# Patient Record
Sex: MALE | Race: WHITE | Employment: OTHER | URBAN - NONMETROPOLITAN AREA
[De-identification: names, ages, dates, MRNs, and addresses within clinical notes are randomized per-mention and may not be internally consistent; named-entity substitution may affect disease eponyms.]

---

## 2021-12-18 ENCOUNTER — APPOINTMENT (OUTPATIENT)
Dept: CT IMAGING | Age: 86
DRG: 477 | End: 2021-12-18
Payer: MEDICARE

## 2021-12-18 ENCOUNTER — APPOINTMENT (OUTPATIENT)
Dept: GENERAL RADIOLOGY | Age: 86
DRG: 477 | End: 2021-12-18
Payer: MEDICARE

## 2021-12-18 ENCOUNTER — HOSPITAL ENCOUNTER (INPATIENT)
Age: 86
LOS: 13 days | Discharge: NURSING, REHAB OR LONG-TERM CARE | DRG: 477 | End: 2021-12-31
Attending: EMERGENCY MEDICINE | Admitting: SURGERY
Payer: MEDICARE

## 2021-12-18 DIAGNOSIS — S22.069A CLOSED FRACTURE OF EIGHTH THORACIC VERTEBRA, UNSPECIFIED FRACTURE MORPHOLOGY, INITIAL ENCOUNTER (HCC): ICD-10-CM
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
## 2021-12-18 PROBLEM — F05 SUNDOWN SYNDROME: Status: ACTIVE | Noted: 2021-12-18

## 2021-12-18 PROBLEM — K21.9 GASTRO-ESOPHAGEAL REFLUX DISEASE WITHOUT ESOPHAGITIS: Status: ACTIVE | Noted: 2021-12-18

## 2021-12-18 PROBLEM — N18.30 STAGE 3 CHRONIC KIDNEY DISEASE (HCC): Status: ACTIVE | Noted: 2021-10-12

## 2021-12-18 PROBLEM — I42.9 CARDIOMYOPATHY (HCC): Status: ACTIVE | Noted: 2021-12-18

## 2021-12-18 PROBLEM — I48.0 PAROXYSMAL ATRIAL FIBRILLATION (HCC): Status: ACTIVE | Noted: 2021-10-12

## 2021-12-18 PROBLEM — Z95.810 BIVENTRICULAR IMPLANTABLE CARDIOVERTER-DEFIBRILLATOR (ICD) IN SITU: Status: ACTIVE | Noted: 2021-10-12

## 2021-12-18 LAB
ALBUMIN SERPL-MCNC: 4.1 G/DL (ref 3.5–5.1)
ALP BLD-CCNC: 127 U/L (ref 38–126)
ALT SERPL-CCNC: 14 U/L (ref 11–66)
ANION GAP SERPL CALCULATED.3IONS-SCNC: 16 MEQ/L (ref 8–16)
APTT: 31 SECONDS (ref 22–38)
AST SERPL-CCNC: 15 U/L (ref 5–40)
BACTERIA: ABNORMAL /HPF
BASOPHILS # BLD: 1 %
BASOPHILS ABSOLUTE: 0.1 THOU/MM3 (ref 0–0.1)
BILIRUB SERPL-MCNC: 0.6 MG/DL (ref 0.3–1.2)
BILIRUBIN DIRECT: < 0.2 MG/DL (ref 0–0.3)
BILIRUBIN URINE: NEGATIVE
BLOOD, URINE: NEGATIVE
BUN BLDV-MCNC: 51 MG/DL (ref 7–22)
CALCIUM SERPL-MCNC: 9.4 MG/DL (ref 8.5–10.5)
CASTS 2: ABNORMAL /LPF
CASTS UA: ABNORMAL /LPF
CHARACTER, URINE: CLEAR
CHLORIDE BLD-SCNC: 98 MEQ/L (ref 98–111)
CO2: 24 MEQ/L (ref 23–33)
COLOR: YELLOW
CREAT SERPL-MCNC: 2 MG/DL (ref 0.4–1.2)
CRYSTALS, UA: ABNORMAL
EKG ATRIAL RATE: 87 BPM
EKG P AXIS: 33 DEGREES
EKG Q-T INTERVAL: 426 MS
EKG QRS DURATION: 166 MS
EKG QTC CALCULATION (BAZETT): 512 MS
EKG R AXIS: -70 DEGREES
EKG T AXIS: 22 DEGREES
EKG VENTRICULAR RATE: 87 BPM
EOSINOPHIL # BLD: 7 %
EOSINOPHILS ABSOLUTE: 0.4 THOU/MM3 (ref 0–0.4)
EPITHELIAL CELLS, UA: ABNORMAL /HPF
ERYTHROCYTE [DISTWIDTH] IN BLOOD BY AUTOMATED COUNT: 12.9 % (ref 11.5–14.5)
ERYTHROCYTE [DISTWIDTH] IN BLOOD BY AUTOMATED COUNT: 46.5 FL (ref 35–45)
GFR SERPL CREATININE-BSD FRML MDRD: 32 ML/MIN/1.73M2
GLUCOSE BLD-MCNC: 103 MG/DL (ref 70–108)
GLUCOSE BLD-MCNC: 93 MG/DL (ref 70–108)
GLUCOSE BLD-MCNC: 97 MG/DL (ref 70–108)
GLUCOSE URINE: NEGATIVE MG/DL
HCT VFR BLD CALC: 39.1 % (ref 42–52)
HEMOGLOBIN: 12.4 GM/DL (ref 14–18)
IMMATURE GRANS (ABS): 0.01 THOU/MM3 (ref 0–0.07)
IMMATURE GRANULOCYTES: 0.2 %
INR BLD: 1.07 (ref 0.85–1.13)
KETONES, URINE: NEGATIVE
LEUKOCYTE ESTERASE, URINE: NEGATIVE
LYMPHOCYTES # BLD: 12.9 %
LYMPHOCYTES ABSOLUTE: 0.8 THOU/MM3 (ref 1–4.8)
MCH RBC QN AUTO: 31.3 PG (ref 26–33)
MCHC RBC AUTO-ENTMCNC: 31.7 GM/DL (ref 32.2–35.5)
MCV RBC AUTO: 98.7 FL (ref 80–94)
MISCELLANEOUS 2: ABNORMAL
MONOCYTES # BLD: 9.9 %
MONOCYTES ABSOLUTE: 0.6 THOU/MM3 (ref 0.4–1.3)
NITRITE, URINE: NEGATIVE
NUCLEATED RED BLOOD CELLS: 0 /100 WBC
PH UA: 5 (ref 5–9)
PLATELET # BLD: 203 THOU/MM3 (ref 130–400)
PMV BLD AUTO: 8.7 FL (ref 9.4–12.4)
POTASSIUM REFLEX MAGNESIUM: 4.6 MEQ/L (ref 3.5–5.2)
PROTEIN UA: 30
RBC # BLD: 3.96 MILL/MM3 (ref 4.7–6.1)
RBC URINE: ABNORMAL /HPF
RENAL EPITHELIAL, UA: ABNORMAL
SEG NEUTROPHILS: 69 %
SEGMENTED NEUTROPHILS ABSOLUTE COUNT: 4.3 THOU/MM3 (ref 1.8–7.7)
SODIUM BLD-SCNC: 138 MEQ/L (ref 135–145)
SPECIFIC GRAVITY, URINE: 1.01 (ref 1–1.03)
TOTAL PROTEIN: 6.9 G/DL (ref 6.1–8)
UROBILINOGEN, URINE: 0.2 EU/DL (ref 0–1)
WBC # BLD: 6.3 THOU/MM3 (ref 4.8–10.8)
WBC UA: ABNORMAL /HPF
YEAST: ABNORMAL

## 2021-12-18 PROCEDURE — 82948 REAGENT STRIP/BLOOD GLUCOSE: CPT

## 2021-12-18 PROCEDURE — 80048 BASIC METABOLIC PNL TOTAL CA: CPT

## 2021-12-18 PROCEDURE — APPSS180 APP SPLIT SHARED TIME > 60 MINUTES: Performed by: NURSE PRACTITIONER

## 2021-12-18 PROCEDURE — 1200000003 HC TELEMETRY R&B

## 2021-12-18 PROCEDURE — 93005 ELECTROCARDIOGRAM TRACING: CPT | Performed by: STUDENT IN AN ORGANIZED HEALTH CARE EDUCATION/TRAINING PROGRAM

## 2021-12-18 PROCEDURE — 85610 PROTHROMBIN TIME: CPT

## 2021-12-18 PROCEDURE — 99221 1ST HOSP IP/OBS SF/LOW 40: CPT | Performed by: NURSE PRACTITIONER

## 2021-12-18 PROCEDURE — 71045 X-RAY EXAM CHEST 1 VIEW: CPT

## 2021-12-18 PROCEDURE — 85730 THROMBOPLASTIN TIME PARTIAL: CPT

## 2021-12-18 PROCEDURE — 99222 1ST HOSP IP/OBS MODERATE 55: CPT | Performed by: NEUROLOGICAL SURGERY

## 2021-12-18 PROCEDURE — 99223 1ST HOSP IP/OBS HIGH 75: CPT | Performed by: SURGERY

## 2021-12-18 PROCEDURE — 3209999900 CT INTERPRETATION OF OUTSIDE IMAGES

## 2021-12-18 PROCEDURE — 6820000002 HC L2 INJURY CALL ACTIVATION: Performed by: SURGERY

## 2021-12-18 PROCEDURE — 85025 COMPLETE CBC W/AUTO DIFF WBC: CPT

## 2021-12-18 PROCEDURE — 99285 EMERGENCY DEPT VISIT HI MDM: CPT

## 2021-12-18 PROCEDURE — 6370000000 HC RX 637 (ALT 250 FOR IP): Performed by: STUDENT IN AN ORGANIZED HEALTH CARE EDUCATION/TRAINING PROGRAM

## 2021-12-18 PROCEDURE — 6370000000 HC RX 637 (ALT 250 FOR IP): Performed by: NURSE PRACTITIONER

## 2021-12-18 PROCEDURE — 2580000003 HC RX 258: Performed by: NURSE PRACTITIONER

## 2021-12-18 PROCEDURE — 80076 HEPATIC FUNCTION PANEL: CPT

## 2021-12-18 PROCEDURE — 81001 URINALYSIS AUTO W/SCOPE: CPT

## 2021-12-18 RX ORDER — DEXTROSE MONOHYDRATE 50 MG/ML
100 INJECTION, SOLUTION INTRAVENOUS PRN
Status: DISCONTINUED | OUTPATIENT
Start: 2021-12-18 | End: 2021-12-31 | Stop reason: HOSPADM

## 2021-12-18 RX ORDER — SODIUM CHLORIDE 9 MG/ML
25 INJECTION, SOLUTION INTRAVENOUS PRN
Status: DISCONTINUED | OUTPATIENT
Start: 2021-12-18 | End: 2021-12-21 | Stop reason: SDUPTHER

## 2021-12-18 RX ORDER — LIDOCAINE 4 G/G
3 PATCH TOPICAL DAILY
Status: DISCONTINUED | OUTPATIENT
Start: 2021-12-18 | End: 2021-12-31 | Stop reason: HOSPADM

## 2021-12-18 RX ORDER — SODIUM CHLORIDE 9 MG/ML
INJECTION, SOLUTION INTRAVENOUS CONTINUOUS
Status: DISCONTINUED | OUTPATIENT
Start: 2021-12-18 | End: 2021-12-24

## 2021-12-18 RX ORDER — SODIUM CHLORIDE 0.9 % (FLUSH) 0.9 %
5-40 SYRINGE (ML) INJECTION PRN
Status: DISCONTINUED | OUTPATIENT
Start: 2021-12-18 | End: 2021-12-21 | Stop reason: SDUPTHER

## 2021-12-18 RX ORDER — HYDROCODONE BITARTRATE AND ACETAMINOPHEN 5; 325 MG/1; MG/1
2 TABLET ORAL EVERY 4 HOURS PRN
Status: DISCONTINUED | OUTPATIENT
Start: 2021-12-18 | End: 2021-12-31 | Stop reason: HOSPADM

## 2021-12-18 RX ORDER — DEXTROSE MONOHYDRATE 25 G/50ML
12.5 INJECTION, SOLUTION INTRAVENOUS PRN
Status: DISCONTINUED | OUTPATIENT
Start: 2021-12-18 | End: 2021-12-31 | Stop reason: HOSPADM

## 2021-12-18 RX ORDER — POLYETHYLENE GLYCOL 3350 17 G/17G
17 POWDER, FOR SOLUTION ORAL DAILY
Status: DISCONTINUED | OUTPATIENT
Start: 2021-12-18 | End: 2021-12-24

## 2021-12-18 RX ORDER — SODIUM PHOSPHATE, DIBASIC AND SODIUM PHOSPHATE, MONOBASIC 7; 19 G/133ML; G/133ML
1 ENEMA RECTAL DAILY PRN
Status: DISCONTINUED | OUTPATIENT
Start: 2021-12-18 | End: 2021-12-31 | Stop reason: HOSPADM

## 2021-12-18 RX ORDER — FAMOTIDINE 20 MG/1
20 TABLET, FILM COATED ORAL EVERY OTHER DAY
Status: DISCONTINUED | OUTPATIENT
Start: 2021-12-18 | End: 2021-12-25

## 2021-12-18 RX ORDER — CYCLOBENZAPRINE HCL 10 MG
10 TABLET ORAL 3 TIMES DAILY PRN
Status: DISCONTINUED | OUTPATIENT
Start: 2021-12-18 | End: 2021-12-31 | Stop reason: HOSPADM

## 2021-12-18 RX ORDER — SODIUM CHLORIDE 0.9 % (FLUSH) 0.9 %
5-40 SYRINGE (ML) INJECTION EVERY 12 HOURS SCHEDULED
Status: DISCONTINUED | OUTPATIENT
Start: 2021-12-18 | End: 2021-12-21 | Stop reason: SDUPTHER

## 2021-12-18 RX ORDER — NICOTINE POLACRILEX 4 MG
15 LOZENGE BUCCAL PRN
Status: DISCONTINUED | OUTPATIENT
Start: 2021-12-18 | End: 2021-12-31 | Stop reason: HOSPADM

## 2021-12-18 RX ORDER — HYDROCODONE BITARTRATE AND ACETAMINOPHEN 5; 325 MG/1; MG/1
1 TABLET ORAL EVERY 4 HOURS PRN
Status: DISCONTINUED | OUTPATIENT
Start: 2021-12-18 | End: 2021-12-31 | Stop reason: HOSPADM

## 2021-12-18 RX ORDER — MORPHINE SULFATE 4 MG/ML
4 INJECTION, SOLUTION INTRAMUSCULAR; INTRAVENOUS
Status: ACTIVE | OUTPATIENT
Start: 2021-12-18 | End: 2021-12-20

## 2021-12-18 RX ORDER — ONDANSETRON 4 MG/1
4 TABLET, ORALLY DISINTEGRATING ORAL EVERY 8 HOURS PRN
Status: DISCONTINUED | OUTPATIENT
Start: 2021-12-18 | End: 2021-12-21 | Stop reason: SDUPTHER

## 2021-12-18 RX ORDER — HYDROCODONE BITARTRATE AND ACETAMINOPHEN 5; 325 MG/1; MG/1
1 TABLET ORAL ONCE
Status: COMPLETED | OUTPATIENT
Start: 2021-12-18 | End: 2021-12-18

## 2021-12-18 RX ORDER — ONDANSETRON 2 MG/ML
4 INJECTION INTRAMUSCULAR; INTRAVENOUS EVERY 6 HOURS PRN
Status: DISCONTINUED | OUTPATIENT
Start: 2021-12-18 | End: 2021-12-21 | Stop reason: SDUPTHER

## 2021-12-18 RX ORDER — MORPHINE SULFATE 2 MG/ML
2 INJECTION, SOLUTION INTRAMUSCULAR; INTRAVENOUS
Status: ACTIVE | OUTPATIENT
Start: 2021-12-18 | End: 2021-12-20

## 2021-12-18 RX ORDER — ACETAMINOPHEN 325 MG/1
650 TABLET ORAL EVERY 4 HOURS PRN
Status: DISCONTINUED | OUTPATIENT
Start: 2021-12-18 | End: 2021-12-21

## 2021-12-18 RX ADMIN — HYDROCODONE BITARTRATE AND ACETAMINOPHEN 1 TABLET: 5; 325 TABLET ORAL at 01:45

## 2021-12-18 RX ADMIN — SODIUM CHLORIDE: 9 INJECTION, SOLUTION INTRAVENOUS at 06:11

## 2021-12-18 RX ADMIN — FAMOTIDINE 20 MG: 20 TABLET ORAL at 09:30

## 2021-12-18 RX ADMIN — CYCLOBENZAPRINE 10 MG: 10 TABLET, FILM COATED ORAL at 09:30

## 2021-12-18 RX ADMIN — HYDROCODONE BITARTRATE AND ACETAMINOPHEN 2 TABLET: 5; 325 TABLET ORAL at 09:30

## 2021-12-18 RX ADMIN — CYCLOBENZAPRINE 10 MG: 10 TABLET, FILM COATED ORAL at 21:07

## 2021-12-18 RX ADMIN — POLYETHYLENE GLYCOL 3350 17 G: 17 POWDER, FOR SOLUTION ORAL at 09:30

## 2021-12-18 ASSESSMENT — ENCOUNTER SYMPTOMS
APNEA: 0
NAUSEA: 0
VOMITING: 0
COUGH: 0
ABDOMINAL DISTENTION: 0
EYE PAIN: 0
BACK PAIN: 1
WHEEZING: 0
CHEST TIGHTNESS: 0
CHOKING: 0
CONSTIPATION: 0
EYE ITCHING: 0
RHINORRHEA: 0
STRIDOR: 0
SHORTNESS OF BREATH: 0
ABDOMINAL PAIN: 0
FACIAL SWELLING: 0
VOICE CHANGE: 0
TROUBLE SWALLOWING: 0
EYE REDNESS: 0
COLOR CHANGE: 0
SORE THROAT: 0
DIARRHEA: 0
PHOTOPHOBIA: 0
EYE DISCHARGE: 0
BLOOD IN STOOL: 0
SINUS PRESSURE: 0

## 2021-12-18 ASSESSMENT — PAIN SCALES - GENERAL
PAINLEVEL_OUTOF10: 5
PAINLEVEL_OUTOF10: 0
PAINLEVEL_OUTOF10: 4
PAINLEVEL_OUTOF10: 8
PAINLEVEL_OUTOF10: 5

## 2021-12-18 ASSESSMENT — PAIN DESCRIPTION - FREQUENCY: FREQUENCY: CONTINUOUS

## 2021-12-18 ASSESSMENT — PAIN DESCRIPTION - LOCATION: LOCATION: BACK

## 2021-12-18 NOTE — ED NOTES
Rolled at this time by staff at bedside     Bird Marquez [facility]  12/18/21 [address], RN  12/18/21 0021

## 2021-12-18 NOTE — PROGRESS NOTES
Spoke with [facility]. They have records of heart cath and cardiology H&P from 2020 with Dr. Oz Hneley. Asked for these to be faxed to [phone].  Consent for release of medical records signed with this RN and faxed to [facility].

## 2021-12-18 NOTE — ED NOTES
Report received from Hank at [facility]. Patient sustained fall at home, was turning away from fridge, slipped and fell. C/o low back pain upon arrival to outlying facility. T-8 fracture discovered. Cleared of C-collar at OSH per Hank. Patient does have pacemaker, Shahla from [employer] was unable to tell this RN if the pacemaker was [address] or [employer]. Patient received 50 mcg Fentanyl IV push and 650 mg Tylenol PO. Patient has existing 20g in [address] [place] [address] per Hank. Hank did not give ETA for patient.      Iftikhar Mas, RN  12/17/21 3641

## 2021-12-18 NOTE — ED TRIAGE NOTES
PATIENT PRESENTS [address] AS A CATEGORY II TRAUMA DUE TO A SUSTAINED FALL. NO LOC. PATIENT C/O BACK PAIN AT OSH. FOUND T6-8 FRACTURES. PATIENT TRANSFERRED HERE FOR MORE IMAGING. AOX4, PWD. BREATHING WITHOUT DIFFICULTY AT REST.

## 2021-12-18 NOTE — PROGRESS NOTES
MRI OF TOTAL SPINE unable to be completed due to pt having 2 separate manufacturers for leads and icd. Only complete systems with same  can be scanned. RN notified.

## 2021-12-18 NOTE — CONSULTS
Luis [address]er [address], [address]                                          NEUROSURGICAL CONSULTATION NOTE       Claude Rattler   YOB: 1932  Account Number: [de-identified]   Date of Examination: 12/18/2021    ASSESSMENT:    -This is an 63-year-old male s/p ground-level fall who underwent spine CTs that showed T8 acute fracture.  -No focal neurological deficit at this time.  -Patient has severe back pain    PLAN:    -Medical management per patient primary.  -Pain control.  - PT and OT as tolerated   -T-spine/L-spine MRIs for further evaluation if his pacemaker device is compatible with MRI. - The case was discussed with patient and his nurse.  -Neurosurgery will follow    HISTORY OF PRESENT ILLNESS:  Claude Rattler is a 80 y.o. male, admitted on :12/18/2021 12:26 AM     This  is an 80year old male who was brought to the ER  as a transfer in from [facility] for evaluation and treatment of a T8 vertebral body fracture following  a fall sustained 3 days prior. Patient reported that he was experiencing increasing back pain over the last 3 days. Patient underwent spine CTs in out side hospital and a T8 vertebral body fracture was found. For this reason, neurosurgery was consulted. Patient denied   hitting his head  Or loss of consciousness. He denied any new focal weakness or numbness after that fall. ROS:    Review of Systems   Constitutional: Negative for fever. HENT: Positive for hearing loss. Eyes: Negative for visual disturbance. Respiratory: Negative for chest tightness. Cardiovascular: Negative for chest pain. Gastrointestinal: Negative for abdominal pain. Genitourinary: Negative for difficulty urinating. Musculoskeletal: Positive for back pain and neck pain. Neurological: Negative for weakness and headaches. Psychiatric/Behavioral: Negative for agitation and confusion.        PROBLEM LIST:  Patient Active Problem List Diagnosis    HTN (hypertension)    DM (diabetes mellitus) ([facility]    Chronic retention of urine, recurrent. Poss. neurgenic bladder from Diabetes.  Fall at home, initial encounter    Cardiomyopathy [facility])    [facility]oxysmal [facility] fibrillation ([facility]    Stage 3 chronic kidney disease ([facility]    SunDown syndrome    Gastro-esophageal reflux disease without esophagitis    Biventricular implantable cardioverter-defibrillator (ICD) in situ    Closed fracture of eighth thoracic vertebra (HCC)       MEDICATIONS:   Prior to Admission medications    Medication Sig Start Date End Date Taking? Authorizing Provider   acetaminophen (TYLENOL) 500 MG tablet Take 500 mg by mouth every 4 hours as needed for Pain   Yes Historical Provider, MD   Multiple Vitamin (MULTI-VITAMIN PO) Take by mouth daily    Historical Provider, MD   ramipril (ALTACE) 1.25 MG capsule Take 1.25 mg by mouth 2 times daily    Historical Provider, MD   senna (SENOKOT) 8.6 MG tablet Take 2 tablets by mouth daily    Historical Provider, MD   polyethylene glycol (GLYCOLAX) powder Take 17 g by mouth as needed    Historical Provider, MD   digoxin (LANOXIN) 125 MCG tablet Take 125 mcg by mouth daily. Historical Provider, MD   doxazosin (CARDURA) 4 MG tablet Take 4 mg by mouth nightly. Historical Provider, MD   bumetanide (BUMEX) 2 MG tablet Take 2 mg by mouth daily. Historical Provider, MD   clopidogrel (PLAVIX) 75 MG tablet Take 75 mg by mouth daily. Historical Provider, MD   Insulin Detemir (LEVEMIR SC) Inject  into the skin. Historical Provider, MD   carvedilol (COREG) 3.125 MG tablet Take 3.125 mg by mouth 2 times daily (with meals). Historical Provider, MD   levothyroxine (SYNTHROID) 150 MCG tablet Take 150 mcg by mouth Daily. Historical Provider, MD   amiodarone (CORDARONE) 200 MG tablet Take 200 mg by mouth daily.  Half tab    Historical Provider, MD       Current Facility-Administered Medications   Medication Dose Route Frequency Provider Last Rate Last Admin    sodium chloride flush 0.9 % injection 5-40 mL  5-40 mL IntraVENous 2 times per day Irvin Nichole, APRN - CNP        sodium chloride flush 0.9 % injection 5-40 mL  5-40 mL IntraVENous PRN Irvin Nichole, APRN - CNP        0.9 % sodium chloride infusion  25 mL IntraVENous PRN Irvin Nichole, APRN - CNP        ondansetron (ZOFRAN-ODT) disintegrating tablet 4 mg  4 mg Oral Q8H PRN Irvin Nichole, APRN - CNP        Or    ondansetron (ZOFRAN) injection 4 mg  4 mg IntraVENous Q6H PRN Irvin Nichole, APRN - CNP        polyethylene glycol (GLYCOLAX) packet 17 g  17 g Oral Daily Irvin Nichole, APRN - CNP   17 g at 12/18/21 0930    fleet rectal enema 1 enema  1 enema Rectal Daily PRN Irvin Nichole, APRN - CNP        0.9 % sodium chloride infusion   IntraVENous Continuous Irvin Nichole, APRN - CNP 50 mL/hr at 12/18/21 0611 New Bag at 12/18/21 0611    acetaminophen (TYLENOL) tablet 650 mg  650 mg Oral Q4H PRN Irvin Nichole, APRN - CNP        morphine (PF) injection 2 mg  2 mg IntraVENous Q2H PRN Irvin Nichole, APRN - CNP        Or    morphine injection 4 mg  4 mg IntraVENous Q2H PRN Irvin Nichole, APRN - CNP        lidocaine 4 % external patch 3 patch  3 patch Topical Daily Irvin Nichole, APRN - CNP   3 patch at 12/18/21 0930    famotidine (PEPCID) tablet 20 mg  20 mg Oral Every Other Day Irvin Nichole, APRN - CNP   20 mg at 12/18/21 0930    cyclobenzaprine (FLEXERIL) tablet 10 mg  10 mg Oral TID PRN Irvin Nichole, APRN - CNP   10 mg at 12/18/21 0930    HYDROcodone-acetaminophen (NORCO) 5-325 MG per tablet 1 tablet  1 tablet Oral Q4H PRN Irvin Nichole, APRN - CNP        Or    HYDROcodone-acetaminophen (NORCO) 5-325 MG per tablet 2 tablet  2 tablet Oral Q4H PRN Irvin Nichole, APRN - CNP   2 tablet at 12/18/21 0930        sodium chloride flush, 5-40 mL, PRN  sodium chloride, 25 mL, PRN  ondansetron, 4 mg, Q8H PRN   Or  ondansetron, 4 mg, Q6H PRN  fleet, 1 enema, Daily PRN  acetaminophen, 650 mg, Q4H PRN  morphine, 2 mg, Q2H PRN   Or  morphine, 4 mg, Q2H PRN  cyclobenzaprine, 10 mg, TID PRN  HYDROcodone 5 mg - acetaminophen, 1 tablet, Q4H PRN   Or  HYDROcodone 5 mg - acetaminophen, 2 tablet, Q4H PRN         sodium chloride      sodium chloride 50 mL/hr at 21 0611         ALLERGIES:   Flomax [tamsulosin hcl] and Tamsulosin    PAST MEDICAL  HISTORY:    has a past medical history of Arthritis, CHF (congestive heart failure) ([facility] Constipation, Hypertension, Thyroid disease, and Type II or unspecified type diabetes mellitus without mention of complication, not stated as uncontrolled. PAST SURGICAL  HISTORY:    has a past surgical history that includes Stomach surgery (); Cholecystectomy (); Pacemaker insertion (); Colonoscopy; other surgical history (2013); and Pacemaker insertion.     SOCIAL HISTORY:   Social History     Tobacco Use    Smoking status: Former Smoker     Quit date: 1983     Years since quittin.5    Smokeless tobacco: Never Used   Substance Use Topics    Alcohol use: No     Alcohol/week: 0.0 standard drinks       FAMILY HISTORY:  Family History   Problem Relation Age of Onset    Stroke Mother     Arthritis Father     Heart Disease Father        LABS  CBC:   Lab Results   Component Value Date    WBC 6.3 2021    RBC 3.96 2021    HGB 12.4 2021    HCT 39.1 2021    MCV 98.7 2021    MCH 31.3 2021    MCHC 31.7 2021    RDW 15.9 2013     2021    MPV 8.7 2021     CMP:    Lab Results   Component Value Date     2021    K 4.6 2021    CL 98 2021    CO2 24 2021    BUN 51 2021    CREATININE 2.0 2021    LABGLOM 32 2021    GLUCOSE 103 2021    PROT 6.9 2021    LABALBU 4.1 2021    CALCIUM 9.4 2021    BILITOT 0.6 2021    ALKPHOS 127 2021    AST 15 2021    ALT 14 2021     PT/INR:    Lab Results   Component Value Date    INR 1.07 12/18/2021       RADIOLOGY:  Pertinent images have been reviewed. T-spine CT showed:    Acute , oblique nondisplaced fracture through the mid and inferior aspects    of the T8 vertebral body. No significant height loss or retropulsion. Additional acute, oblique fracture through the T8 spinous process. Disruption of anterior longitudinal ligament calcification at the T8 level    and therefore ligamentous disruption cannot be excluded. EXAMINATION:    Patient awake alert and oriented x3  Has slurred speech  GCS: 15/15   Pupils: equal and reactive   FCx4 with good strength through out   Sensory: grossly intact  Reflexes: 1+ through out. Planter reflex: Down response  Has tenderness over the middle aspect of his T-spine.    Complaining from severe back pain with movements.         *I spend a total of 50 minutes with greater than 60% of time spent face to face, counseling, coordinating care, examining patient, reviewing images and labs personally, and speaking with team.      Neymar Brannon MD  Electronically signed 12/18/2021

## 2021-12-18 NOTE — PROGRESS NOTES
Pharmacy Renal Adjustment    Gretta Boateng is a 80 y.o. male. Pharmacy renally adjust the following medications per [employer] approved policy: famotidine    Height:   Ht Readings from Last 1 Encounters:   12/18/21 6' 1\" (1.854 m)     Weight:  Wt Readings from Last 1 Encounters:   12/18/21 200 lb (90.7 kg)     Recent Labs     12/18/21  0027   BUN 51*   CREATININE 2.0*     Estimated Creatinine Clearance: 28 mL/min (A) (based on SCr of 2 mg/dL (H)).   Calculated CrCl:    Assessment:  CKD    Plan:   Decrease famotidine from 20mg bid to 20mg every other day

## 2021-12-18 NOTE — PROGRESS NOTES
Neurosurgery Interim Progress Note    Patient:  Janae Heredia      Unit/Bed:5K-01/001-A    YOB: 1932    MRN: [id number]     Acct: [de-identified]     Admit date: 12/18/2021    Chief Complaint   Patient presents with    Trauma     CATEGORY II, FALL. Patient Seen, Chart, Physician notes, Labs, Radiology studies reviewed. The patient is seen and evaluated on the floor evaluation exam findings reviewed discussed with Dr. Tatiana Pastrana with nursing. Patient is resting comfortably with pain moderately controlled. He has complaints of positional mid thoracic back pain with tenderness to palpation and symptoms aggravated with motion. This is consistent with multiple spinous process fractures indicated on CT scanning for T6, T7, and T8 along with a vertebral body fracture of thoracic 8. No focal neurological deficits are noted at the time of exam this morning with pain control in process. MRIs of the lumbar and thoracic spine are pending for review of note: Patient listed on medication list (Plavix) but denies taking the medication, nursing acknowledges that the medication has not been provided to the patient since admission. Patient recommendation and treatment plan from a neurosurgical perspective at this time:    MRI of the lumbar and thoracic spine are ordered with STIR enhancement to ascertain number and acuteness of possible fractures and to rule out significant ligamentous injury    Neurosurgery recommends continuing pain control along with a hold on any and all anticoagulant medications should the patient be deemed a candidate for surgical intervention.     A detailed neurosurgical note to follow        Electronically signed by Jaden Jernigan PA-C on 12/18/2021 at 10:37 AM

## 2021-12-18 NOTE — FLOWSHEET NOTE
12/18/21 1414   Provider Notification   Reason for Communication Review case   Provider Name [facility], [facility]   Provider Notification Advance Practice Clinician (CNS/NP/CNM/CRNA/PA)   Method of Communication Secure Message   Response Waiting for response   Notification Time [phone]     Notified patient's pacemaker is not compatible with MRI. Joselin Alford from Neurosurgery also updated on inability to do MRI.

## 2021-12-18 NOTE — ED NOTES
POSTERIOR ASSESSMENT PERFORMED BY RESIDENT DR. Corean Sacks. THERE IS OBVIOUS LEFT MID THORACIC DEFORMITY. PATIENT ENDORSES UPPER LUMBAR TENDERNESS AND TENDERNESS THROUGHOUT ENTIRE THORACIC VERTEBRA AREA.      Lorie Solo RN  12/18/21 8903

## 2021-12-18 NOTE — ED NOTES
Confirmed with Roya Shane on [address] that floor is ready for patient. Patient to be taken to [phone] by this RN.       Aishwarya Lawler RN  12/18/21 2006

## 2021-12-18 NOTE — PROGRESS NOTES
Carb control diet placed for patient as Dr. Vandana Pearce stated patient does not need to be NPO today from his standpoint and MRI not being done.

## 2021-12-18 NOTE — ED NOTES
Patient still needs MRI form completed. This RN transporting patient to floor 5K at this time.      Lorie Solo RN  12/18/21 3738

## 2021-12-18 NOTE — ED NOTES
Bed: 001A  Expected date: 12/17/21  Expected time: 11:59 PM  Means of arrival: EMS  Comments:  TRAUMA TRANSFER-- [address], RN  12/18/21 6520

## 2021-12-18 NOTE — H&P
[]Level III (Trauma Consult) []Downgraded  Mode of Arrival: EMS transportation  Referring Facility: [place] [address]   Loss of Consciousness [x]No []Yes[]Unknown  Duration(min)  Mechanism of Injury:  []Motor Vehicle crash   []Single Vehicle [] []Passenger []Scene Fatality []Front Seat  []Restrained   []Air Bag Deployed   []Ejected []Rollover []Pedestrian []Trapped   Type of vehicle:   Protective Devices:   []Motorcycle  Wearing Helmet []Yes []No  []Bicycle  Wearing Helmet []Yes []No  [x]Fall   Distance - ground level    []Assault    Abuse Reported []Yes []No  []Gunshot  []Stabbing  []Work Related  []Burn: []Flame []Scald []Electrical []Chemical []Contact []Inhalation []House Fire  []Other:   Patient Active Problem List   Diagnosis    HTN (hypertension)    DM (diabetes mellitus) ([facility]    Chronic retention of urine, recurrent. Poss. neurgenic bladder from Diabetes.  Fall at home, initial encounter    Cardiomyopathy [facility])    Paroxysmal atrial fibrillation ([facility]    Stage 3 [facility]    SunDown syndrome    Gastro-esophageal reflux disease without esophagitis    Biventricular implantable cardioverter-defibrillator (ICD) in situ     Subjective   Chief Complaint: Fall    History of Present Illness:  Gissell Colmenares is an 80year old male presenting to the Emergency Department as a transfer in from [place] [address] for evaluation and treatment of a T8 vertebral body fracture from a fall sustained 3 days prior. He reports that he was experiencing increasing back pain so he went to the outlying ER for evaluation where they found a fracture of the T8 vertebral body. He denies hitting his head and denies loss of consciousness. He denies any numbness or tingling, no loss of bowel or bladder control. Only complaint is neck and back pain.        Review of Systems:   Review of Systems   Constitutional: Negative for activity change, appetite change, chills, diaphoresis, fatigue, fever and unexpected weight change. HENT: Positive for hearing loss. Negative for congestion, dental problem, drooling, ear discharge, ear pain, facial swelling, mouth sores, nosebleeds, postnasal drip, rhinorrhea, sinus pressure, sneezing, sore throat, tinnitus, trouble swallowing and voice change. Eyes: Negative for photophobia, pain, discharge, redness, itching and visual disturbance. Respiratory: Negative for apnea, cough, choking, chest tightness, shortness of breath, wheezing and stridor. Cardiovascular: Negative for chest pain, palpitations and leg swelling. Gastrointestinal: Negative for abdominal distention, abdominal pain, blood in stool, constipation, diarrhea, nausea and vomiting. Endocrine: Negative for cold intolerance, heat intolerance, polydipsia, polyphagia and polyuria. Genitourinary: Negative for difficulty urinating, dysuria, flank pain, frequency, hematuria and urgency. Musculoskeletal: Positive for back pain, gait problem and neck pain. Negative for arthralgias, joint swelling, myalgias and neck stiffness. Skin: Negative for color change, pallor, rash and wound. Allergic/Immunologic: Negative for environmental allergies, food allergies and immunocompromised state. Neurological: Negative for dizziness, tremors, seizures, syncope, facial asymmetry, speech difficulty, weakness, light-headedness, numbness and headaches. Hematological: Negative for adenopathy. Does not bruise/bleed easily. Psychiatric/Behavioral: Negative for agitation, behavioral problems, confusion, decreased concentration, dysphoric mood, hallucinations, self-injury, sleep disturbance and suicidal ideas. The patient is not nervous/anxious and is not hyperactive.         Flomax [tamsulosin hcl] and Tamsulosin  Past Surgical History:   Procedure Laterality Date    CHOLECYSTECTOMY  1990    COLONOSCOPY      OTHER SURGICAL HISTORY  08/12/2013    Green light laser photoselective vaporization of prostate    PACEMAKER INSERTION  2011 with Defibrillator    PACEMAKER INSERTION      STOMACH SURGERY  1975    Ulcer     Past Medical History:   Diagnosis Date    Arthritis     [facility] failure[facility]     Constipation     Hypertension     Thyroid disease     Type II or unspecified type diabetes mellitus without mention of complication, not stated as uncontrolled      Past Surgical History:   Procedure Laterality Date    CHOLECYSTECTOMY      COLONOSCOPY      OTHER SURGICAL HISTORY  2013    Green light laser photoselective vaporization of prostate    PACEMAKER INSERTION      with Defibrillator    PACEMAKER INSERTION      [address]    Ulcer     Social History     Socioeconomic History    Marital status:      Spouse name: None    Number of children: None    Years of education: None    Highest education level: None   Occupational History    None   Tobacco Use    Smoking status: Former Smoker     Quit date: 1983     Years since quittin.5    Smokeless tobacco: Never Used   Substance and Sexual Activity    Alcohol use: No     Alcohol/week: 0.0 standard drinks    Drug use: No    Sexual activity: None   Other Topics Concern    None   Social History Narrative    None     Social Determinants of Health     Financial Resource Strain:     Difficulty of Paying Living Expenses: Not on file   Food Insecurity:     Worried About Running Out of Food in the Last Year: Not on file    Mery of Food in the Last Year: Not on file   Transportation Needs:     Lack of Transportation (Medical): Not on file    Lack of Transportation (Non-Medical):  Not on file   Physical Activity:     Days of Exercise per Week: Not on file    Minutes of Exercise per Session: Not on file   Stress:     Feeling of Stress : Not on file   Social Connections:     Frequency of Communication with Friends and Family: Not on file    Frequency of Social Gatherings with Friends and Family: Not on file    Attends Sabianism Services: Not on file    Active Member of Clubs or Organizations: Not on file    Attends Club or Organization Meetings: Not on file    Marital Status: Not on file   Intimate Partner Violence:     Fear of Current or Ex-Partner: Not on file    Emotionally Abused: Not on file    Physically Abused: Not on file    Sexually Abused: Not on file   Housing Stability:     Unable to Pay for Housing in the Last Year: Not on file    Number of [place] in the Last Year: Not on file    Unstable Housing in the Last Year: Not on file     Family History   Problem Relation Age of Onset    Stroke Mother     Arthritis Father     Heart Disease Father        Home medications:    Current Discharge Medication List      CONTINUE these medications which have NOT CHANGED    Details   acetaminophen (TYLENOL) 500 MG tablet Take 500 mg by mouth every 4 hours as needed for Pain      Multiple Vitamin (MULTI-VITAMIN PO) Take by mouth daily      ramipril (ALTACE) 1.25 MG capsule Take 1.25 mg by mouth 2 times daily      senna (SENOKOT) 8.6 MG tablet Take 2 tablets by mouth daily      polyethylene glycol (GLYCOLAX) powder Take 17 g by mouth as needed      digoxin (LANOXIN) 125 MCG tablet Take 125 mcg by mouth daily. doxazosin (CARDURA) 4 MG tablet Take 4 mg by mouth nightly. bumetanide (BUMEX) 2 MG tablet Take 2 mg by mouth daily. clopidogrel (PLAVIX) 75 MG tablet Take 75 mg by mouth daily. Insulin Detemir (LEVEMIR SC) Inject  into the skin. carvedilol (COREG) 3.125 MG tablet Take 3.125 mg by mouth 2 times daily (with meals). levothyroxine (SYNTHROID) 150 MCG tablet Take 150 mcg by mouth Daily. amiodarone (CORDARONE) 200 MG tablet Take 200 mg by mouth daily.  Half tab             Hospital medications:  Scheduled Meds:   sodium chloride flush  5-40 mL IntraVENous 2 times per day    polyethylene glycol  17 g Oral Daily    lidocaine  3 patch Topical Daily    famotidine  20 mg Oral Every Other Day     Continuous Infusions:   sodium chloride      sodium chloride 50 mL/hr at 12/18/21 0611     PRN Meds:  Objective   ED TRIAGE VITALS  BP: 138/74, Temp: 97.8 °F (36.6 °C), Pulse: 78, Resp: 18, SpO2: 96 %  [place] Coma Scale  Eye Opening: Spontaneous  Best Verbal Response: Oriented  Best Motor Response: Obeys commands  [place] Coma Scale Score: 15  Results for orders placed or performed during the hospital encounter of 12/18/21   CBC Auto Differential   Result Value Ref Range    WBC 6.3 4.8 - 10.8 thou/mm3    RBC 3.96 (L) 4.70 - 6.10 mill/mm3    Hemoglobin 12.4 (L) 14.0 - 18.0 gm/dl    Hematocrit 39.1 (L) 42.0 - 52.0 %    MCV 98.7 (H) 80.0 - 94.0 fL    MCH 31.3 26.0 - 33.0 pg    MCHC 31.7 (L) 32.2 - 35.5 gm/dl    RDW-CV 12.9 11.5 - 14.5 %    RDW-SD 46.5 (H) 35.0 - 45.0 fL    Platelets 212 909 - 110 thou/mm3    MPV 8.7 (L) 9.4 - 12.4 fL    Seg Neutrophils 69.0 %    Lymphocytes 12.9 %    Monocytes 9.9 %    Eosinophils 7.0 %    Basophils 1.0 %    Immature Granulocytes 0.2 %    Segs Absolute 4.3 1.8 - 7.7 thou/mm3    Lymphocytes Absolute 0.8 (L) 1.0 - 4.8 thou/mm3    Monocytes Absolute 0.6 0.4 - 1.3 thou/mm3    Eosinophils Absolute 0.4 0.0 - 0.4 thou/mm3    Basophils Absolute 0.1 0.0 - 0.1 thou/mm3    Immature Grans (Abs) 0.01 0.00 - 0.07 thou/mm3    nRBC 0 /100 wbc   Basic Metabolic Panel w/ Reflex to MG   Result Value Ref Range    Sodium 138 135 - 145 meq/L    Potassium reflex Magnesium 4.6 3.5 - 5.2 meq/L    Chloride 98 98 - 111 meq/L    CO2 24 23 - 33 meq/L    Glucose 103 70 - 108 mg/dL    BUN 51 (H) 7 - 22 mg/dL    CREATININE 2.0 (H) 0.4 - 1.2 mg/dL    Calcium 9.4 8.5 - 10.5 mg/dL   Hepatic Function Panel   Result Value Ref Range    Albumin 4.1 3.5 - 5.1 g/dL    Total Bilirubin 0.6 0.3 - 1.2 mg/dL    Bilirubin, Direct <0.2 0.0 - 0.3 mg/dL    Alkaline Phosphatase 127 (H) 38 - 126 U/L    AST 15 5 - 40 U/L    ALT 14 11 - 66 U/L    Total Protein 6.9 6.1 - 8.0 g/dL   Protime-INR   Result Value Ref Range INR 1.07 0.85 - 1.13   APTT   Result Value Ref Range    aPTT 31.0 22.0 - 38.0 seconds   Anion Gap   Result Value Ref Range    Anion Gap 16.0 8.0 - 16.0 meq/L   Glomerular Filtration Rate, Estimated   Result Value Ref Range    Est, Glom Filt Rate 32 (A) ml/min/1.73m2   Urine with Reflexed Micro   Result Value Ref Range    Glucose, Ur NEGATIVE NEGATIVE mg/dl    Bilirubin Urine NEGATIVE NEGATIVE    Ketones, Urine NEGATIVE NEGATIVE    Specific Gravity, Urine 1.013 1.002 - 1.030    Blood, Urine NEGATIVE NEGATIVE    pH, UA 5.0 5.0 - 9.0    Protein, UA 30 (A) NEGATIVE    Urobilinogen, Urine 0.2 0.0 - 1.0 eu/dl    Nitrite, Urine NEGATIVE NEGATIVE    Leukocyte Esterase, Urine NEGATIVE NEGATIVE    Color, UA YELLOW STRAW-YELLOW    Character, Urine CLEAR CLEAR-SL CLOUD    RBC, UA NONE SEEN 0-2/hpf /hpf    WBC, UA 0-2 0-4/hpf /hpf    Epithelial Cells, UA NONE SEEN 3-5/hpf /hpf    Bacteria, UA NONE SEEN FEW/NONE SEEN /hpf    Casts UA NONE SEEN NONE SEEN /lpf    Crystals, UA NONE SEEN NONE SEEN    Renal Epithelial, UA NONE SEEN NONE SEEN    Yeast, UA NONE SEEN NONE SEEN    CASTS 2 NONE SEEN NONE SEEN /lpf    MISCELLANEOUS 2 NONE SEEN    EKG 12 Lead   Result Value Ref Range    Ventricular Rate 87 BPM    Atrial Rate 87 BPM    QRS Duration 166 ms    Q-T Interval 426 ms    QTc Calculation (Bazett) 512 ms    P Axis 33 degrees    R Axis -70 degrees    T Axis 22 degrees       Physical Exam:  Patient Vitals for the past 24 hrs:   BP Temp Temp src Pulse Resp SpO2 Height Weight   12/18/21 0500 138/74 97.8 °F (36.6 °C) Oral 78 18 96 % -- --   12/18/21 0436 131/77 -- -- 75 17 97 % -- --   12/18/21 0421 130/76 -- -- 80 18 95 % -- --   12/18/21 0406 127/76 -- -- 85 19 94 % -- --   12/18/21 0351 129/73 -- -- 85 13 -- -- --   12/18/21 0321 136/77 -- -- 87 18 97 % -- --   12/18/21 0306 114/72 -- -- 80 15 92 % -- --   12/18/21 0256 116/65 -- -- 86 20 90 % -- --   12/18/21 0235 129/71 -- -- 85 15 96 % -- --   12/18/21 0220 129/74 -- -- 82 18 -- -- --   12/18/21 0205 121/64 -- -- 82 21 95 % -- --   12/18/21 0150 116/77 -- -- 81 17 94 % -- --   12/18/21 0140 131/63 -- -- 80 17 94 % -- --   12/18/21 0110 128/68 -- -- 87 17 94 % -- --   12/18/21 0059 (!) 150/108 -- -- 95 20 96 % -- --   12/18/21 0040 (!) 150/129 -- -- 88 22 96 % -- --   12/18/21 0035 (!) 139/125 -- -- 102 17 96 % -- --   12/18/21 0030 (!) 130/109 -- -- 85 29 97 % -- --   12/18/21 0023 (!) 140/85 -- -- -- -- -- -- --   12/18/21 0022 -- -- -- 78 18 96 % -- --   12/18/21 0022 (!) 140/85 97.9 °F (36.6 °C) Oral 78 18 96 % 6' 1\" (1.854 m) 200 lb (90.7 kg)     Primary Assessment:  Airway: Patent, trachea midline  Breathing: Breath sounds present and equal bilaterally, spontaneous, and unlabored  Circulation: Hemodynamically stable, 2+ central and peripheral pulses. Disability: BARBOSA x 4, following commands. GCS =15    Secondary Assessment:  General: Alert, NAD. Head: Normocephalic, mid face stable. Tympanic membranes intact. Nares patent bilaterally, no epistaxis. Mouth clear of foreign bodies, no lacerations or abrasions. Eyes: PERRLA. EOMI. Nontraumatic. Neurologic: A & O x3. Following commands. CN 2-12 intact  Neck: trachea midline. Cervical spines TTP midline, without step-offs, crepitus or deformity. Back:T spines are TTP midline, with deformity noted at middle thoracic level. No abrasions, contusions, or ecchymosis noted. Lumbar spines are also TTP midline but with no deformities or step offs noted  Lungs: Clear to auscultation bilaterally. Chest Wall: Chest rise symmetrical.  Chest wall without tenderness to palpation. No crepitus, deformities, lacerations, or abrasions. Heart: Irregularly irregular. Normal S1/S2. No obvious M/G/R. Abdomen:  Soft, NTTP. No guarding. Non-peritoneal.  Pelvis:  NTTP, stable to compression. Femoral pulses 2+. GI/: No blood at the urinary meatus. No gross hematuria. Extremities: No gross deformities. PMS intact.   Radial /DP/PT pulses 2+ bilaterally. Bilateral lower extremity edema, +1  Skin: Skin warm and dry. Normal for ethnicity. Radiology:     CT INTERPRETATION OF OUTSIDE IMAGES   Final Result   Acute , oblique nondisplaced fracture through the mid and inferior aspects    of the T8 vertebral body. No significant height loss or retropulsion. Additional acute, oblique fracture through the T8 spinous process. Disruption of anterior longitudinal ligament calcification at the T8 level    and therefore ligamentous disruption cannot be excluded. This document has been electronically signed by: Bronson Viveros MD on    12/18/2021 04:17 AM      All CTs at this facility use dose modulation techniques and iterative    reconstructions, and/or weight-based dosing   when appropriate to reduce radiation to a low as reasonably achievable. CT INTERPRETATION OF OUTSIDE IMAGES   Final Result   No acute process            **This report has been created using voice recognition software. It may contain minor errors which are inherent in voice recognition technology. **      Final report electronically signed by Dr. Fay Bah on 12/18/2021 1:00 AM      CT INTERPRETATION OF OUTSIDE IMAGES   Final Result   No acute process            **This report has been created using voice recognition software. It may contain minor errors which are inherent in voice recognition technology. **      Final report electronically signed by Dr. Fay Bah on 12/18/2021 12:56 AM      CT INTERPRETATION OF OUTSIDE IMAGES   Final Result   No acute process            **This report has been created using voice recognition software. It may contain minor errors which are inherent in voice recognition technology. **      Final report electronically signed by Dr. Fay Bah on 12/18/2021 12:59 AM      XR CHEST PORTABLE   Final Result   No acute disease            **This report has been created using voice recognition software.   It may contain minor errors which are inherent in voice recognition technology. **      Final report electronically signed by Dr. Javed Webster on 12/18/2021 12:42 AM      MRI THORACIC SPINE WO CONTRAST    (Results Pending)   MRI LUMBAR SPINE [address]    (Results Pending)   MRI CERVICAL SPINE [address]    (Results Pending)     Fast Exam: Yes    Electronically signed by MESSI Nixon CNP on 12/18/2021 at 7:11 AM

## 2021-12-18 NOTE — PROGRESS NOTES
Pt admitted to  5 by cart/stretcher from ED. IV normal saline infusing into the antecubital right, condition patent and no redness. IV site free of s/s of infection or infiltration. Vital signs obtained. Assessment complete. Oriented to room. All questions answered with no further questions at this time. Two nurse skin assessment performed by Preet Meza and Selena Da Silva RN. Oriented to room. Policies and procedures for  explained. A Fall prevention and safety brochure discussed with patient. Bed alarm on. Call light in reach.       Electronically signed by Mirella Lutz RN on 12/18/2021 at 5:46 AM

## 2021-12-18 NOTE — FLOWSHEET NOTE
12/18/21 1208   Provider Notification   Reason for Communication Review case   Provider Name Dr. Yanna Jones   Provider Notification Physician   Method of Communication Secure Message   Response Waiting for response   Notification Time [phone]     Cardiology consult completed per order.

## 2021-12-18 NOTE — ED NOTES
Patient requests one side rail be down due to claustrophobia. Patient educated that he is not to get up without calling for assistance due to the extent of his back injury. Patient verbalizes understanding and has his call light in his right hand. Patient requesting food. Patient educated he cannot eat right now due to plan of care. Patient states \"for God sakes, why don't you just kill me then. \" Patient redirected, placed in position of comfort.      Flor Rust RN  12/18/21 3421

## 2021-12-18 NOTE — ED PROVIDER NOTES
[facility] ENCOUNTER        Pt Name: Eyal Vann  MRN: [id number]  [place] 10/25/1932  Date of evaluation: 12/17/2021  Treating Resident Physician: Efra Mai MD  Supervising Physician: Fritz Hope DO      TRAUMA ACTIVATION   Level: II  Criteria: Transfer from [address] [place] [address], Known Spinal Fractures  Arrival: EMS      CHIEF COMPLAINT       Chief Complaint   Patient presents with    Trauma     CATEGORY II, FALL. Patient interviewed and examined by me immediately on arrival.  History and Physical exam limited by: Nothing  Further information obtained after primary survey and initial critical actions were performed. History obtained from chart review and the patient. PRIMARY SURVEY AND INTERVENTION   Airway: Patent. Breathing: Regular respiratory pattern, symmetric chest rise, lungs clear to auscultation. Circulation: Good capillary refill, no identifiable external bleeding, good pulses in all extremities. Exposure: No additional injuries identified. Disability: No focal neurological deficit. Patient awake. FAST: No visible abdominal free fluid, no pericardial effusion, no identifiable pneumothorax. See full report on [facility]. INITIAL MEDICAL DECISION MAKING   Initial assessment:   1. Fall from standing  2. Spinal fractures found in imaging at outside hospital    PLAN: Large bore IV lines, cardiac/respiratory monitoring, labs, analgesia, trauma team activated prior to arrival, bedside US, observation. SECONDARY SURVEY AND INTERVENTION  HISTORY OF PRESENT ILLNESS    Eyal Vann is a 80 y.o. male who presents to the emergency department for evaluation of fall from standing. Palomo Genaoner from standing position a couple days ago at the time EMS was called and they assisted him off the floor. Yesterday his pain was getting worse and therefore went to his local hospital at [facility].   There he was found to have fractures of his thoracic spine and was transferred to us for further evaluation. He does not take any blood thinners. He complains of pain in his neck and back. He had a negative CT cervical spine at the outside hospital and received a dose of fentanyl prior to transfer. The patient has no other acute complaints at this time. REVIEW OF SYSTEMS   Review of Systems   Constitutional: Negative for chills and fever. HENT: Negative for rhinorrhea, sneezing and sore throat. Respiratory: Negative for chest tightness and shortness of breath. Cardiovascular: Negative for chest pain. Gastrointestinal: Negative for abdominal pain, constipation, diarrhea, nausea and vomiting. Genitourinary: Negative for dysuria and urgency. Musculoskeletal: Positive for back pain and neck pain. Neurological: Negative for dizziness, tremors, seizures, syncope, facial asymmetry, weakness, light-headedness, numbness and headaches. Psychiatric/Behavioral: Negative for agitation and confusion. All other systems reviewed and are negative. PAST MEDICAL AND SURGICAL HISTORY     Past Medical History:   Diagnosis Date    Arthritis     CH[facility]congestive heart failure) ([facility] [address]     Constipation     Hypertension     Thyroid disease     Type II or unspecified type diabetes mellitus without mention of complication, not stated as uncontrolled        Past Surgical History:   Procedure Laterality Date    CHOLECYSTECTOMY  1990    COLONOSCOPY      OTHER SURGICAL HISTORY  08/12/2013    Green light laser photoselective vaporization of prostate    PACEMAKER INSERTION  2011    with [address]    Ulcer     Unable to confirm at this moment, Except as available on EMR.       MEDICATIONS     Current Facility-Administered Medications:     sodium chloride flush 0.9 % injection 5-40 mL, 5-40 mL, IntraVENous, 2 times per day, Clemencia Warren APRN - CNP    sodium chloride flush 0.9 % injection 5-40 mL, 5-40 mL, IntraVENous, PRN, Renate Loss, APRN - CNP    0.9 % sodium chloride infusion, 25 mL, IntraVENous, PRN, Renate Loss, APRN - CNP    ondansetron (ZOFRAN-ODT) disintegrating tablet 4 mg, 4 mg, Oral, Q8H PRN **OR** ondansetron (ZOFRAN) injection 4 mg, 4 mg, IntraVENous, Q6H PRN, Renate Loss, APRN - CNP    polyethylene glycol (GLYCOLAX) packet 17 g, 17 g, Oral, Daily, Renate Loss, APRN - CNP    fleet rectal enema 1 enema, 1 enema, Rectal, Daily PRN, Renate Loss, APRN - CNP    0.9 % sodium chloride infusion, , IntraVENous, Continuous, Renate Loss, APRN - CNP, Last Rate: 50 mL/hr at 21 0611, New Bag at 21 0611    acetaminophen (TYLENOL) tablet 650 mg, 650 mg, Oral, Q4H PRN, Renate Loss, APRN - CNP    morphine (PF) injection 2 mg, 2 mg, IntraVENous, Q2H PRN **OR** morphine injection 4 mg, 4 mg, IntraVENous, Q2H PRN, Renate Loss, APRN - CNP    lidocaine 4 % external patch 3 patch, 3 patch, Topical, Daily, Renate Loss, APRN - CNP    famotidine (PEPCID) tablet 20 mg, 20 mg, Oral, Every Other Day, Renate Loss, APRN - CNP    cyclobenzaprine (FLEXERIL) tablet 10 mg, 10 mg, Oral, TID PRN, Renate Loss, APRN - CNP    HYDROcodone-acetaminophen (NORCO) 5-325 MG per tablet 1 tablet, 1 tablet, Oral, Q4H PRN **OR** HYDROcodone-acetaminophen (NORCO) 5-325 MG per tablet 2 tablet, 2 tablet, Oral, Q4H PRN, Renate Loss, APRN - CNP  Unable to confirm at this moment, Except as available on EMR. SOCIAL HISTORY     Social History     Social History Narrative    Not on file     Social History     Tobacco Use    Smoking status: Former Smoker     Quit date: 1983     Years since quittin.5    Smokeless tobacco: Never Used   Substance Use Topics    Alcohol use: No     Alcohol/week: 0.0 standard drinks    Drug use: No     Unable to confirm at this moment, Except as available on EMR.       ALLERGIES     Allergies   Allergen Reactions    Flomax [Tamsulosin Hcl] Other (See Comments)    Tamsulosin      Per Son/ Anneliese Doom was discontinued due to patient having constant dripping from nose     Unable to confirm at this moment, Except as available on EMR. FAMILY HISTORY     Family History   Problem Relation Age of Onset    Stroke Mother     Arthritis Father     Heart Disease Father      Unable to confirm at this moment, Except as available on EMR. PREVIOUS RECORDS   Previous records reviewed: Imaging reports from outside hosptial reviewed ED notes not available due to down time. PHYSICAL EXAM     ED Triage Vitals [12/18/21 0022]   BP Temp Temp Source Pulse Resp SpO2 Height Weight   (!) 140/85 97.9 °F (36.6 °C) Oral 78 18 96 % 6' 1\" (1.854 m) 200 lb (90.7 kg)     Initial vital signs and nursing assessment reviewed and normal. Pulsoximetry is normal per my interpretation. Additional Vital Signs:  Vitals:    12/18/21 0500   BP: 138/74   Pulse: 78   Resp: 18   Temp: 97.8 °F (36.6 °C)   SpO2: 96%       EKG monitor: Ventricularly paced rhythm    Physical Exam  Vitals and nursing note reviewed. Constitutional:       General: He is not in acute distress. Appearance: He is normal weight. He is not ill-appearing, toxic-appearing or diaphoretic. HENT:      Head: Normocephalic and atraumatic. Right Ear: Ear canal and external ear normal. There is no impacted cerumen. Left Ear: Ear canal and external ear normal. There is no impacted cerumen. Nose: Nose normal.      Mouth/Throat:      Mouth: Mucous membranes are moist.      Pharynx: Oropharynx is clear. No oropharyngeal exudate or posterior oropharyngeal erythema. Eyes:      General: No scleral icterus. Right eye: No discharge. Left eye: No discharge. Extraocular Movements: Extraocular movements intact. Conjunctiva/sclera: Conjunctivae normal.      Pupils: Pupils are equal, round, and reactive to light. Cardiovascular:      Rate and Rhythm: Normal rate and regular rhythm. Pulses: Normal pulses. Heart sounds: Normal heart sounds. Pulmonary:      Effort: Pulmonary effort is normal.      Breath sounds: Normal breath sounds. Abdominal:      General: Abdomen is flat. Palpations: Abdomen is soft. Tenderness: There is no abdominal tenderness. There is no guarding or rebound. Musculoskeletal:      Cervical back: Normal range of motion. Tenderness present. Thoracic back: Tenderness and bony tenderness present. Lumbar back: Tenderness and bony tenderness present. Skin:     General: Skin is warm and dry. Neurological:      Mental Status: He is alert and oriented to person, place, and time. Psychiatric:         Mood and Affect: Mood normal.         Behavior: Behavior normal.             FURTHER MEDICAL DECISION MAKING   Additional Assessment:     ECG, portal chest x-ray, review outside images, analgesia. Further PLAN: Admit to trauma service, consider MRIs.         ED RESULTS   Laboratory results:  Labs Reviewed   CBC WITH AUTO DIFFERENTIAL - Abnormal; Notable for the following components:       Result Value    RBC 3.96 (*)     Hemoglobin 12.4 (*)     Hematocrit 39.1 (*)     MCV 98.7 (*)     MCHC 31.7 (*)     RDW-SD 46.5 (*)     MPV 8.7 (*)     Lymphocytes Absolute 0.8 (*)     All other components within normal limits   BASIC METABOLIC PANEL W/ REFLEX TO MG FOR LOW K - Abnormal; Notable for the following components:    BUN 51 (*)     CREATININE 2.0 (*)     All other components within normal limits   HEPATIC FUNCTION PANEL - Abnormal; Notable for the following components:    Alkaline Phosphatase 127 (*)     All other components within normal limits   GLOMERULAR FILTRATION RATE, ESTIMATED - Abnormal; Notable for the following components:    Est, Glom Filt Rate 32 (*)     All other components within normal limits   URINE WITH REFLEXED MICRO - Abnormal; Notable for the following components:    Protein, UA 30 (*)     All other components within normal limits PROTIME-INR   APTT   ANION GAP       Radiologic studies results:  CT INTERPRETATION OF OUTSIDE IMAGES   Final Result   Acute , oblique nondisplaced fracture through the mid and inferior aspects    of the T8 vertebral body. No significant height loss or retropulsion. Additional acute, oblique fracture through the T8 spinous process. Disruption of anterior longitudinal ligament calcification at the T8 level    and therefore ligamentous disruption cannot be excluded. This document has been electronically signed by: Walter Renae MD on    12/18/2021 04:17 AM      All CTs at this facility use dose modulation techniques and iterative    reconstructions, and/or weight-based dosing   when appropriate to reduce radiation to a low as reasonably achievable. CT INTERPRETATION OF OUTSIDE IMAGES   Final Result   No acute process            **This report has been created using voice recognition software. It may contain minor errors which are inherent in voice recognition technology. **      Final report electronically signed by Dr. Alley Carranza on 12/18/2021 1:00 AM      CT INTERPRETATION OF OUTSIDE IMAGES   Final Result   No acute process            **This report has been created using voice recognition software. It may contain minor errors which are inherent in voice recognition technology. **      Final report electronically signed by Dr. Alley Carranza on 12/18/2021 12:56 AM      CT INTERPRETATION OF OUTSIDE IMAGES   Final Result   No acute process            **This report has been created using voice recognition software. It may contain minor errors which are inherent in voice recognition technology. **      Final report electronically signed by Dr. Alley Carranza on 12/18/2021 12:59 AM      XR CHEST PORTABLE   Final Result   No acute disease            **This report has been created using voice recognition software. It may contain minor errors which are inherent in voice recognition technology. **      Final report electronically signed by Dr. Mirna Brennan on 12/18/2021 12:42 AM      MRI THORACIC SPINE [address]    (Results Pending)   MRI LUMBAR SPINE [address]    (Results Pending)   MRI CERVICAL SPINE [address]    (Results Pending)       ED Medications administered this visit:   Medications   sodium chloride flush 0.9 % injection 5-40 mL (has no administration in time range)   sodium chloride flush 0.9 % injection 5-40 mL (has no administration in time range)   0.9 % sodium chloride infusion (has no administration in time range)   ondansetron (ZOFRAN-ODT) disintegrating tablet 4 mg (has no administration in time range)     Or   ondansetron (ZOFRAN) injection 4 mg (has no administration in time range)   polyethylene glycol (GLYCOLAX) packet 17 g (has no administration in time range)   fleet rectal enema 1 enema (has no administration in time range)   0.9 % sodium chloride infusion ( IntraVENous New Bag 12/18/21 0611)   acetaminophen (TYLENOL) tablet 650 mg (has no administration in time range)   morphine (PF) injection 2 mg (has no administration in time range)     Or   morphine injection 4 mg (has no administration in time range)   lidocaine 4 % external patch 3 patch (has no administration in time range)   famotidine (PEPCID) tablet 20 mg (has no administration in time range)   cyclobenzaprine (FLEXERIL) tablet 10 mg (has no administration in time range)   HYDROcodone-acetaminophen (NORCO) 5-325 MG per tablet 1 tablet (has no administration in time range)     Or   HYDROcodone-acetaminophen (NORCO) 5-325 MG per tablet 2 tablet (has no administration in time range)   HYDROcodone-acetaminophen (NORCO) 5-325 MG per tablet 1 tablet (1 tablet Oral Given 12/18/21 0145)         ED COURSE      Summary:  Transferred from [facility] with known thoracic spinal fracture. On arrival he was assessed by our trauma team.  Negative FAST exam, portal chest x-ray showed no acute.   Outside images were reviewed by our radiologist and showed a T8 fracture, no other injuries observed on imaging. He received 1 hydrocodone acetaminophen for analgesia in the ED with good effect. He will be admitted to the Trauma Service. MEDICATION CHANGES     Current Discharge Medication List            FINAL DISPOSITION     Final diagnoses:   Fall in home, initial encounter   Closed fracture of eighth thoracic vertebra, unspecified fracture morphology, initial encounter ([facility] [address]     Condition: condition: stable  Dispo: Admit to med/surg floor      This transcription was electronically signed. It was dictated by use of voice recognition software and electronically transcribed. The transcription may contain errors not detected in proofreading.        Sheela Suarez MD  Resident  12/18/21 8440

## 2021-12-18 NOTE — CONSULTS
denies hitting his head and denies any loss of consciousness; currently he is awake, knows Samuel is coming and can state its 2021 however he is not sure where he is; patient states he does not take any medications at home however meds are listed we need to confirm this with the family; he offers no complaints to me at this time. Past Medical History:        Diagnosis Date    Arthritis     CHF (congestive heart failure) (HCC)     Constipation     Hypertension     Thyroid disease     Type II or unspecified type diabetes mellitus without mention of complication, not stated as uncontrolled        Past Surgical History:        Procedure Laterality Date    CHOLECYSTECTOMY  1990    COLONOSCOPY      OTHER SURGICAL HISTORY  08/12/2013    Green light laser photoselective vaporization of prostate    PACEMAKER INSERTION  2011    with Defibrillator    PACEMAKER INSERTION      STOMACH SURGERY  1975    Ulcer       Medications: Scheduled Meds:   sodium chloride flush  5-40 mL IntraVENous 2 times per day    polyethylene glycol  17 g Oral Daily    lidocaine  3 patch Topical Daily    famotidine  20 mg Oral Every Other Day     Continuous Infusions:   sodium chloride      sodium chloride 50 mL/hr at 12/18/21 0611     PRN Meds:.sodium chloride flush, sodium chloride, ondansetron **OR** ondansetron, fleet, acetaminophen, morphine **OR** morphine, cyclobenzaprine, HYDROcodone 5 mg - acetaminophen **OR** HYDROcodone 5 mg - acetaminophen    Allergies:  Flomax [tamsulosin hcl] and Tamsulosin    Social History:    reports that he quit smoking about 38 years ago. He has never used smokeless tobacco. He reports that he does not drink alcohol and does not use drugs.       Family History:       Problem Relation Age of Onset    Stroke Mother     Arthritis Father     Heart Disease Father          Review of Systems:  Constitutional: ROS: negative for - chills or fever  Head: no headache, no head injury, no migraine   Eyes ROS: denies blurred/double vision  Ears ROS: no hearing difficulty, no tinnitus  Mouth and Throat ROS: no ulceration, dysphagia, dental caries  Psychological ROS: no depression, no anxiety, no panic attacks, denies suicide/homicide ideation  Endocrine ROS: denies polyuria, polydypsia, no heat or cold intolerance  Respiratory ROS: no cough, shortness of breath, or wheezing  Cardiovascular ROS: no chest pain or dyspnea on exertion  Gastrointestinal ROS: no abdominal pain, change in bowel habits, or black or bloody stools  Genito-Urinary ROS: denies dysuria, frequency, urgency; denies hematuria  Musculoskeletal ROS: positive for - pain in mid back area however denies that now  Neurological ROS: no syncope, no seizures, no numbness or tingling of hands, no numbness or tingling of feet, no paresis  Dermatology: no skin rash, no eczema  Endocrine: no polyuria, polydypsia, no heat/cold intolerance  Hematology: denies bruising easily, denies bleeding problems, denies clotting disorders            Physical Exam:    Vitals:  Patient Vitals for the past 24 hrs:   BP Temp Temp src Pulse Resp SpO2 Height Weight   12/18/21 1116 121/68 99 °F (37.2 °C) Oral 79 16 94 % -- --   12/18/21 0734 122/75 97.9 °F (36.6 °C) Oral 75 16 98 % -- --   12/18/21 0500 138/74 97.8 °F (36.6 °C) Oral 78 18 96 % -- --   12/18/21 0436 131/77 -- -- 75 17 97 % -- --   12/18/21 0421 130/76 -- -- 80 18 95 % -- --   12/18/21 0406 127/76 -- -- 85 19 94 % -- --   12/18/21 0351 129/73 -- -- 85 13 -- -- --   12/18/21 0321 136/77 -- -- 87 18 97 % -- --   12/18/21 0306 114/72 -- -- 80 15 92 % -- --   12/18/21 0256 116/65 -- -- 86 20 90 % -- --   12/18/21 0235 129/71 -- -- 85 15 96 % -- --   12/18/21 0220 129/74 -- -- 82 18 -- -- --   12/18/21 0205 121/64 -- -- 82 21 95 % -- --   12/18/21 0150 116/77 -- -- 81 17 94 % -- --   12/18/21 0140 131/63 -- -- 80 17 94 % -- --   12/18/21 0110 128/68 -- -- 87 17 94 % -- --   12/18/21 0059 (!) 150/108 -- -- 95 20 96 % -- -- 12/18/21 0040 (!) 150/129 -- -- 88 22 96 % -- --   12/18/21 0035 (!) 139/125 -- -- 102 17 96 % -- --   12/18/21 0030 (!) 130/109 -- -- 85 29 97 % -- --   12/18/21 0023 (!) 140/85 -- -- -- -- -- -- --   12/18/21 0022 -- -- -- 78 18 96 % -- --   12/18/21 0022 (!) 140/85 97.9 °F (36.6 °C) Oral 78 18 96 % 6' 1\" (1.854 m) 200 lb (90.7 kg)     Weight: Weight: 200 lb (90.7 kg)     24 hour intake/output:No intake or output data in the 24 hours ending 12/18/21 1210    General appearance - normal appearing weight; appears stated age  HEENT-atraumatic, normocephalic; PERRL; conjunctiva pink; sclera anicteric; oral mucosa pink and moist; trachea midline; neck supple; no carotid bruit auscultated; no JVD  Respiratory - clear to auscultation, no wheezes, rales or rhonchi, symmetric air entry  Cardiovascular - normal rate and regular rhythm  Gastrointestinal - soft, nontender, active bowel sounds x4  Obese: No  Neurological - alert and oriented to person, time~month and day; cranial nerves 2-12 grossly intact; speech is clear   Musculoskeletal - no joint tenderness, deformity or swelling, movement of extremities x 4 intact~wiggles all toes and fingers; no lower extremity edema; pedal and posterior tibialis pulses 2+  Integumentary - pink, warm, dry      Review of Labs and Diagnostic Testing:    CBC:   Recent Labs     12/18/21  0358   WBC 6.3   HGB 12.4*        BMP:    Recent Labs     12/18/21 0027      K 4.6   CL 98   CO2 24   BUN 51*   CREATININE 2.0*   GLUCOSE 103     Calcium:  Recent Labs     12/18/21 0027   CALCIUM 9.4     INR:   Recent Labs     12/18/21 0027   INR 1.07     Hepatic:   Recent Labs     12/18/21 0027   ALKPHOS 127*   ALT 14   AST 15   PROT 6.9   BILITOT 0.6   BILIDIR <0.2   LABALBU 4.1     Radiology studies:     XR CHEST PORTABLE    Result Date: 12/18/2021  PROCEDURE: XR CHEST PORTABLE CLINICAL INFORMATION: Trauma . TECHNIQUE: Portable semiupright COMPARISON: No prior study.  FINDINGS: Heart and mediastinal and hilar contours are within normal limits. No infiltrates or effusions. Vessels are not congested left-sided AICD. EKG leads overlie the chest.     No acute disease **This report has been created using voice recognition software. It may contain minor errors which are inherent in voice recognition technology. ** Final report electronically signed by Dr. Noemí Olivas on 12/18/2021 12:42 AM    CT INTERPRETATION OF OUTSIDE IMAGES    Result Date: 12/18/2021  ADDENDUM #1  Receipt of this report by the clinical staff was confirmed with Emily Em RN on Dec 18, 2021 04:29:00 EST. This document has been electronically signed by: Letty Ruiz on 12/18/2021 04:30 AM  ORIGINAL REPORT  CT thoracic spine without IV contrast. Comparison: None Findings: Normal alignment. Osteopenia. Acute oblique nondisplaced fracture through the mid and inferior aspects of the T8 vertebral body. No significant height loss or retropulsion. Additional acute, oblique fracture through the T8 spinous process. Disruption of anterior longitudinal ligament calcification at the T8 level and therefore ligamentous disruption cannot be excluded. Multilevel degenerative disc space narrowing and hypertrophic spurring. Prominent ligamentous calcifications. No high grade canal stenosis noted. No paraspinal soft tissue hematoma. Visualized lung fields without acute pathology. Moderate cardiomegaly. Dense atheromatous coronary vascular calcifications. Acute , oblique nondisplaced fracture through the mid and inferior aspects of the T8 vertebral body. No significant height loss or retropulsion. Additional acute, oblique fracture through the T8 spinous process. Disruption of anterior longitudinal ligament calcification at the T8 level and therefore ligamentous disruption cannot be excluded. This document has been electronically signed by:  Kenia Templeton MD on 12/18/2021 04:17 AM All CTs at this facility use dose modulation techniques and iterative reconstructions, and/or weight-based dosing when appropriate to reduce radiation to a low as reasonably achievable. CT INTERPRETATION OF OUTSIDE IMAGES    Result Date: 12/18/2021  PROCEDURE: CT INTERPRETATION OF OUTSIDE IMAGES CLINICAL INFORMATION: trauma transfer . TECHNIQUE: 2-D multiplanar reconstructed images of the lumbar spine All CT scans at this facility use dose modulation, iterative reconstruction, and/or weight-based dosing when appropriate to reduce radiation dose to as low as reasonably achievable. COMPARISON: No prior study. FINDINGS: No acute fracture or acute alignment malalignment. Severe degenerative changes of the disks and marginal osteophytes. Bones are osteopenic. Incidental note is made of a distention of the urinary bladder partially imaged. No acute process **This report has been created using voice recognition software. It may contain minor errors which are inherent in voice recognition technology. ** Final report electronically signed by Dr. Fay Bah on 12/18/2021 1:00 AM    CT INTERPRETATION OF OUTSIDE IMAGES    Result Date: 12/18/2021  PROCEDURE: CT INTERPRETATION OF OUTSIDE IMAGES CLINICAL INFORMATION: trauma transfer . TECHNIQUE: 2-D multiplanar noncontrast images of the cervical spine done at [facility]). All CT scans at this facility use dose modulation, iterative reconstruction, and/or weight-based dosing when appropriate to reduce radiation dose to as low as reasonably achievable. COMPARISON: No prior study. FINDINGS: No acute fracture or acute bony malalignment. Severe degenerative changes throughout. Osteopenia. No precervical soft tissue swelling. No acute process **This report has been created using voice recognition software. It may contain minor errors which are inherent in voice recognition technology. ** Final report electronically signed by Dr. Fay Bah on 12/18/2021 12:59 AM    CT INTERPRETATION OF OUTSIDE IMAGES    Result Date: 12/18/2021  PROCEDURE: CT INTERPRETATION OF OUTSIDE IMAGES CLINICAL INFORMATION: trauma transfer  . TECHNIQUE: 2-D multiplanar noncontrast images of the brain done at [facility] All CT scans at this facility use dose modulation, iterative reconstruction, and/or weight-based dosing when appropriate to reduce radiation dose to as low as reasonably achievable. COMPARISON: No prior study. FINDINGS: Marked generalized cerebral volume loss. Ventricular megaly consistent with volume loss. No evidence of acute hemorrhage. No acute infarct seen. Calvarium is intact. Small air-fluid level in the right maxillary sinus postsurgical changes of the sinus. Radiopaque foreign body posterior to the right maxillary sinus postsurgical. Other visualized paranasal sinuses and mastoid is clear. No acute process **This report has been created using voice recognition software. It may contain minor errors which are inherent in voice recognition technology. ** Final report electronically signed by Dr. Nicole Mohr on 12/18/2021 12:56 AM      EKG: Paced      Thank you Dr. Miles Pollock for allowing me to participate in this patient's care.     Kim Boland, APRN - CNP, CNP

## 2021-12-19 LAB
ALBUMIN SERPL-MCNC: 3.8 G/DL (ref 3.5–5.1)
ALP BLD-CCNC: 122 U/L (ref 38–126)
ALT SERPL-CCNC: 10 U/L (ref 11–66)
ANGLE TEG: 74.5 DEG (ref 53–72)
ANION GAP SERPL CALCULATED.3IONS-SCNC: 14 MEQ/L (ref 8–16)
AST SERPL-CCNC: 14 U/L (ref 5–40)
BILIRUB SERPL-MCNC: 0.9 MG/DL (ref 0.3–1.2)
BUN BLDV-MCNC: 45 MG/DL (ref 7–22)
CALCIUM SERPL-MCNC: 9.1 MG/DL (ref 8.5–10.5)
CHLORIDE BLD-SCNC: 103 MEQ/L (ref 98–111)
CO2: 24 MEQ/L (ref 23–33)
CREAT SERPL-MCNC: 1.8 MG/DL (ref 0.4–1.2)
EPL-TEG: 0.1 %
ERYTHROCYTE [DISTWIDTH] IN BLOOD BY AUTOMATED COUNT: 12.7 % (ref 11.5–14.5)
ERYTHROCYTE [DISTWIDTH] IN BLOOD BY AUTOMATED COUNT: 46.4 FL (ref 35–45)
GFR SERPL CREATININE-BSD FRML MDRD: 36 ML/MIN/1.73M2
GLUCOSE BLD-MCNC: 116 MG/DL (ref 70–108)
GLUCOSE BLD-MCNC: 142 MG/DL (ref 70–108)
GLUCOSE BLD-MCNC: 163 MG/DL (ref 70–108)
GLUCOSE BLD-MCNC: 174 MG/DL (ref 70–108)
GLUCOSE BLD-MCNC: 181 MG/DL (ref 70–108)
HCT VFR BLD CALC: 39.8 % (ref 42–52)
HEMOGLOBIN: 12.4 GM/DL (ref 14–18)
HEPARIN THERAPY: NO
INHIBITION AA TEG: 14.5 %
INHIBITION ADP TEG: 59.7 %
KINETICS TEG: 1.1 MINUTES (ref 1–3)
LY30 (LYSIS) TEG: 0.1 % (ref 0–7.5)
MA (MAX CLOT) TEG: 72.8 MM (ref 50–70)
MA(AA) TEG: 62.7 MM
MA(ACTIVATED) TEG: 3 MM
MA(ADP) TEG: 31.1 MM
MCH RBC QN AUTO: 30.9 PG (ref 26–33)
MCHC RBC AUTO-ENTMCNC: 31.2 GM/DL (ref 32.2–35.5)
MCV RBC AUTO: 99.3 FL (ref 80–94)
PLATELET # BLD: 192 THOU/MM3 (ref 130–400)
PMV BLD AUTO: 8.9 FL (ref 9.4–12.4)
POTASSIUM REFLEX MAGNESIUM: 4.6 MEQ/L (ref 3.5–5.2)
RBC # BLD: 4.01 MILL/MM3 (ref 4.7–6.1)
REACTION TIME TEG: 5.2 MINUTES (ref 5–10)
SODIUM BLD-SCNC: 141 MEQ/L (ref 135–145)
TOTAL PROTEIN: 6.4 G/DL (ref 6.1–8)
WBC # BLD: 5.9 THOU/MM3 (ref 4.8–10.8)

## 2021-12-19 PROCEDURE — APPSS60 APP SPLIT SHARED TIME 46-60 MINUTES: Performed by: PHYSICIAN ASSISTANT

## 2021-12-19 PROCEDURE — 99223 1ST HOSP IP/OBS HIGH 75: CPT | Performed by: INTERNAL MEDICINE

## 2021-12-19 PROCEDURE — 99233 SBSQ HOSP IP/OBS HIGH 50: CPT | Performed by: NEUROLOGICAL SURGERY

## 2021-12-19 PROCEDURE — 36415 COLL VENOUS BLD VENIPUNCTURE: CPT

## 2021-12-19 PROCEDURE — 6370000000 HC RX 637 (ALT 250 FOR IP): Performed by: NURSE PRACTITIONER

## 2021-12-19 PROCEDURE — 80053 COMPREHEN METABOLIC PANEL: CPT

## 2021-12-19 PROCEDURE — APPSS60 APP SPLIT SHARED TIME 46-60 MINUTES: Performed by: NURSE PRACTITIONER

## 2021-12-19 PROCEDURE — 1200000003 HC TELEMETRY R&B

## 2021-12-19 PROCEDURE — 2580000003 HC RX 258: Performed by: NURSE PRACTITIONER

## 2021-12-19 PROCEDURE — 82948 REAGENT STRIP/BLOOD GLUCOSE: CPT

## 2021-12-19 PROCEDURE — 99232 SBSQ HOSP IP/OBS MODERATE 35: CPT | Performed by: SURGERY

## 2021-12-19 PROCEDURE — 85027 COMPLETE CBC AUTOMATED: CPT

## 2021-12-19 PROCEDURE — 85576 BLOOD PLATELET AGGREGATION: CPT

## 2021-12-19 RX ADMIN — INSULIN LISPRO 1 UNITS: 100 INJECTION, SOLUTION INTRAVENOUS; SUBCUTANEOUS at 17:15

## 2021-12-19 RX ADMIN — HYDROCODONE BITARTRATE AND ACETAMINOPHEN 2 TABLET: 5; 325 TABLET ORAL at 13:27

## 2021-12-19 RX ADMIN — CYCLOBENZAPRINE 10 MG: 10 TABLET, FILM COATED ORAL at 05:48

## 2021-12-19 RX ADMIN — INSULIN LISPRO 1 UNITS: 100 INJECTION, SOLUTION INTRAVENOUS; SUBCUTANEOUS at 07:56

## 2021-12-19 RX ADMIN — HYDROCODONE BITARTRATE AND ACETAMINOPHEN 2 TABLET: 5; 325 TABLET ORAL at 22:48

## 2021-12-19 RX ADMIN — POLYETHYLENE GLYCOL 3350 17 G: 17 POWDER, FOR SOLUTION ORAL at 07:56

## 2021-12-19 RX ADMIN — SODIUM CHLORIDE, PRESERVATIVE FREE 10 ML: 5 INJECTION INTRAVENOUS at 19:34

## 2021-12-19 RX ADMIN — CYCLOBENZAPRINE 10 MG: 10 TABLET, FILM COATED ORAL at 22:48

## 2021-12-19 RX ADMIN — HYDROCODONE BITARTRATE AND ACETAMINOPHEN 1 TABLET: 5; 325 TABLET ORAL at 07:56

## 2021-12-19 RX ADMIN — INSULIN LISPRO 1 UNITS: 100 INJECTION, SOLUTION INTRAVENOUS; SUBCUTANEOUS at 20:41

## 2021-12-19 RX ADMIN — CYCLOBENZAPRINE 10 MG: 10 TABLET, FILM COATED ORAL at 13:27

## 2021-12-19 ASSESSMENT — ENCOUNTER SYMPTOMS
BACK PAIN: 1
CHEST TIGHTNESS: 0
ABDOMINAL PAIN: 0
APNEA: 0
SHORTNESS OF BREATH: 0
ABDOMINAL DISTENTION: 0

## 2021-12-19 ASSESSMENT — PAIN SCALES - GENERAL
PAINLEVEL_OUTOF10: 5
PAINLEVEL_OUTOF10: 7
PAINLEVEL_OUTOF10: 9
PAINLEVEL_OUTOF10: 0
PAINLEVEL_OUTOF10: 6

## 2021-12-19 NOTE — PROGRESS NOTES
Luis Waller 60  OCCUPATIONAL THERAPY MISSED TREATMENT NOTE  [facility] ONC MED 5K  5K-01/001-A      Date: 2021  Patient Name: Michelle Peace        CSN: [id number]   : 10/25/1932  (80 y.o.)  Gender: male                REASON FOR MISSED TREATMENT: Pt continues on strict bedrest. Per nuerosurgery: possible kyphoplasty tomorrom

## 2021-12-19 NOTE — PROGRESS NOTES
Hospitalist Progress Note    Patient:  Irwin Aguirre      Unit/Bed:5K-01/001-A    YOB: 1932    MRN: [id number]       Acct: [de-identified]     PCP: Lyric Ribeiro    Date of Admission: 12/18/2021    Assessment/Plan:    1. T8 vertebral body fracture with anterior longitudinal ligament disruption--per neurosurgery; unable to have MRI secondary to the pacemaker not being compatible; neurosurgery planning kyphoplasty in the morning  2. T6, T7 and T8 spinous process fracture--per neurosurgery  3. History of atrial fibrillation--EKG showing paced rhythm; follows with Dr. Araseli Aceves as his cardiologist  4. 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 Essential hypertension, uncontrolled--need to clarify his home medications as he has Altace and Coreg listed  6. Diabetes mellitus type 2, uncontrolled--need to clarify home medications; low-dose sliding scale as he has Levemir listed however no dosage; RN states family to bring in medications, his sugar this morning was 142  7. History of heart failure--awaiting old records; please be very cautious with IV fluids     Expected discharge date: Per primary and clinical course    Disposition:    [x] Home       [] TCU       [] Rehab       [] Psych       [] SNF       [] [place]       [] Other-    Chief Complaint: Fall at home    Hospital Course:   The patient is a 80 y.o. male whom I have been requested to see by Dr. Eva Mohamud for evaluation of preop evaluation medical management; this patient has a past medical history of hypertension, atrial fibrillation, diabetes and heart failure; his cardiologist is Dr. Araseli Aceves from [facility]; he has a pacemaker and awaiting records to decide if it is MRI compatible to further evaluate his thoracic spine fracture; this patient was a transfer in from [facility] after he fell 3 days ago and was experiencing increased back pain; he is a very poor historian and no family at the bedside, he denies hitting his head and denies any loss of consciousness; currently he is awake, knows Samuel is coming and can state its 2021 however he is not sure where he is; patient states he does not take any medications at home however meds are listed we need to confirm this with the family; he offers no complaints to me at this time.     12/19--> unable to have MRI secondary to the pacemaker being not compatible, I spoke to neurosurgery and reviewed the note and planning kyphoplasty in the morning    Subjective (past 24 hours): No pain at rest however RN states any movement his pain is quite severe    Medications:  Reviewed    Infusion Medications    sodium chloride      sodium chloride 50 mL/hr at 12/18/21 7308    dextrose       Scheduled Medications    sodium chloride flush  5-40 mL IntraVENous 2 times per day    polyethylene glycol  17 g Oral Daily    lidocaine  3 patch Topical Daily    famotidine  20 mg Oral Every Other Day    insulin lispro  0-6 Units SubCUTAneous TID WC    insulin lispro  0-3 Units SubCUTAneous Nightly     PRN Meds: benzocaine-menthol, sodium chloride flush, sodium chloride, ondansetron **OR** ondansetron, fleet, acetaminophen, morphine **OR** morphine, cyclobenzaprine, HYDROcodone 5 mg - acetaminophen **OR** HYDROcodone 5 mg - acetaminophen, glucose, dextrose, glucagon (rDNA), dextrose      Intake/Output Summary (Last 24 hours) at 12/19/2021 1138  Last data filed at 12/18/2021 2030  Gross per 24 hour   Intake 447 ml   Output 1125 ml   Net -678 ml       Diet:  ADULT DIET; Regular; 4 carb choices (60 gm/meal); Low Sodium (2 gm)  Diet NPO    Exam:  BP (!) 151/78   Pulse 77   Temp 98.4 °F (36.9 °C) (Oral)   Resp 16   Ht 6' 1\" (1.854 m)   Wt 201 lb (91.2 kg)   SpO2 97%   BMI 26.52 kg/m²     General appearance: No apparent distress, appears stated age and cooperative.   HEENT: Pupils equal, round, and reactive to light. Conjunctivae/corneas clear. Neck: Supple, with full range of motion. No jugular venous distention. Trachea midline. Respiratory:  Normal respiratory effort. Clear to auscultation, bilaterally without Rales/Wheezes/Rhonchi. Cardiovascular: Regular rate and rhythm with normal S1/S2 without murmurs, rubs or gallops. Abdomen: Soft, non-tender, non-distended with normal bowel sounds. Musculoskeletal: passive and active ROM x 4 extremities~wiggles all fingers and toes. Skin: Skin color, texture, turgor normal.    Neurologic:  Neurovascularly intact without any focal sensory/motor deficits. Cranial nerves: II-XII intact, grossly non-focal.  Psychiatric: Alert and oriented to name and birthdate however I did just wake him up  Capillary Refill: Brisk,< 3 seconds   Peripheral Pulses: +2 palpable, equal bilaterally     Labs:   Recent Labs     12/18/21  0358 12/19/21 0621   WBC 6.3 5.9   HGB 12.4* 12.4*   HCT 39.1* 39.8*    192     Recent Labs     12/18/21  0027 12/19/21  0621    141   K 4.6 4.6   CL 98 103   CO2 24 24   BUN 51* 45*   CREATININE 2.0* 1.8*   CALCIUM 9.4 9.1     Recent Labs     12/18/21  0027 12/19/21 0621   AST 15 14   ALT 14 10*   BILIDIR <0.2  --    BILITOT 0.6 0.9   ALKPHOS 127* 122     Recent Labs     12/18/21 0027   INR 1.07     Microbiology:    None    Urinalysis:      Lab Results   Component Value Date    NITRU NEGATIVE 12/18/2021    WBCUA 0-2 12/18/2021    BACTERIA NONE SEEN 12/18/2021    RBCUA NONE SEEN 12/18/2021    BLOODU NEGATIVE 12/18/2021    GLUCOSEU NEGATIVE 12/18/2021       Radiology:  XR CHEST PORTABLE    Result Date: 12/18/2021  PROCEDURE: XR CHEST PORTABLE CLINICAL INFORMATION: Trauma . TECHNIQUE: Portable semiupright COMPARISON: No prior study. FINDINGS: Heart and mediastinal and hilar contours are within normal limits. No infiltrates or effusions. Vessels are not congested left-sided AICD.  EKG leads overlie the chest.     No acute disease **This report has been created using voice recognition software. It may contain minor errors which are inherent in voice recognition technology. ** Final report electronically signed by Dr. Anatoly Arnold on 12/18/2021 12:42 AM    CT INTERPRETATION OF OUTSIDE IMAGES    Result Date: 12/18/2021   ADDENDUM #1  Receipt of this report by the clinical staff was confirmed with Katey Hartley RN on Dec 18, 2021 04:29:00 EST. This document has been electronically signed by: Alva Barillas on 12/18/2021 04:30 AM  ORIGINAL REPORT  CT thoracic spine without IV contrast. Comparison: None Findings: Normal alignment. Osteopenia. Acute oblique nondisplaced fracture through the mid and inferior aspects of the T8 vertebral body. No significant height loss or retropulsion. Additional acute, oblique fracture through the T8 spinous process. Disruption of anterior longitudinal ligament calcification at the T8 level and therefore ligamentous disruption cannot be excluded. Multilevel degenerative disc space narrowing and hypertrophic spurring. Prominent ligamentous calcifications. No high grade canal stenosis noted. No paraspinal soft tissue hematoma. Visualized lung fields without acute pathology. Moderate cardiomegaly. Dense atheromatous coronary vascular calcifications. Acute , oblique nondisplaced fracture through the mid and inferior aspects of the T8 vertebral body. No significant height loss or retropulsion. Additional acute, oblique fracture through the T8 spinous process. Disruption of anterior longitudinal ligament calcification at the T8 level and therefore ligamentous disruption cannot be excluded. This document has been electronically signed by: Beth Epley, MD on 12/18/2021 04:17 AM All CTs at this facility use dose modulation techniques and iterative reconstructions, and/or weight-based dosing when appropriate to reduce radiation to a low as reasonably achievable.     CT INTERPRETATION OF OUTSIDE IMAGES    Result Date: 12/18/2021  PROCEDURE: CT INTERPRETATION OF OUTSIDE IMAGES CLINICAL INFORMATION: trauma transfer . TECHNIQUE: 2-D multiplanar reconstructed images of the lumbar spine All CT scans at this facility use dose modulation, iterative reconstruction, and/or weight-based dosing when appropriate to reduce radiation dose to as low as reasonably achievable. COMPARISON: No prior study. FINDINGS: No acute fracture or acute alignment malalignment. Severe degenerative changes of the disks and marginal osteophytes. Bones are osteopenic. Incidental note is made of a distention of the urinary bladder partially imaged. No acute process **This report has been created using voice recognition software. It may contain minor errors which are inherent in voice recognition technology. ** Final report electronically signed by Dr. Noemí Olivas on 12/18/2021 1:00 AM    CT INTERPRETATION OF OUTSIDE IMAGES    Result Date: 12/18/2021  PROCEDURE: CT INTERPRETATION OF OUTSIDE IMAGES CLINICAL INFORMATION: trauma transfer . TECHNIQUE: 2-D multiplanar noncontrast images of the cervical spine done at [facility]). All CT scans at this facility use dose modulation, iterative reconstruction, and/or weight-based dosing when appropriate to reduce radiation dose to as low as reasonably achievable. COMPARISON: No prior study. FINDINGS: No acute fracture or acute bony malalignment. Severe degenerative changes throughout. Osteopenia. No precervical soft tissue swelling. No acute process **This report has been created using voice recognition software. It may contain minor errors which are inherent in voice recognition technology. ** Final report electronically signed by Dr. Noemí Olivas on 12/18/2021 12:59 AM    CT INTERPRETATION OF OUTSIDE IMAGES    Result Date: 12/18/2021  PROCEDURE: CT INTERPRETATION OF OUTSIDE IMAGES CLINICAL INFORMATION: trauma transfer  . TECHNIQUE: 2-D multiplanar noncontrast images of the brain done at [facility]).  All CT scans at this facility use dose modulation, iterative reconstruction, and/or weight-based dosing when appropriate to reduce radiation dose to as low as reasonably achievable. COMPARISON: No prior study. FINDINGS: Marked generalized cerebral volume loss. Ventricular megaly consistent with volume loss. No evidence of acute hemorrhage. No acute infarct seen. Calvarium is intact. Small air-fluid level in the right maxillary sinus postsurgical changes of the sinus. Radiopaque foreign body posterior to the right maxillary sinus postsurgical. Other visualized paranasal sinuses and mastoid is clear. No acute process **This report has been created using voice recognition software. It may contain minor errors which are inherent in voice recognition technology. ** Final report electronically signed by Dr. Nena Willett on 12/18/2021 12:56 AM      DVT prophylaxis: [] Lovenox                                 [x] SCDs                                 [] SQ Heparin                                 [] Encourage ambulation           [] Already on Anticoagulation     Code Status: Full Code    PT/OT Eval Status: Per primary    Tele:   [] yes             [x] no    Active Hospital Problems    Diagnosis Date Noted    Fall at home, initial encounter [W19. Shahnaz Nick, D74.089] 12/18/2021    Closed fracture of eighth thoracic vertebra [facility]) [S22.069A] 12/18/2021       Electronically signed by MESSI Hoff CNP on 12/19/2021 at 11:38 AM

## 2021-12-19 NOTE — PROGRESS NOTES
I have independently performed an evaluation on Alberto Rosas . I have reviewed the above documentation completed by the [facility]. Please see my additional contributions to the HPI, physical exam, assessment/medical decision making. Pain with moveent, no distress, interactive, planning for kyph tomorrow    Electronically signed by Doc Gandhi MD on 12/20/2021 at 4:08 PM    [address] Surgery - Dr. Kevin Altamirano  Daily Progress Note  Pt Name: Pankaj Becker Record Number: [id number]  Date of Birth 10/25/1932   Today's Date: 12/19/2021    HD: # 1    CC: Back pain with movement    ASSESSMENT  1. Active Hospital Problems    Diagnosis Date Noted    Fall at home, initial encounter [W19. Desiree Meng, C14.837] 12/18/2021    Closed fracture of eighth thoracic vertebra ([address] [S22.069A] 12/18/2021         PLAN  Admit to Trauma Services     Trauma by fall              - PT and OT when appropriate              - Fall precautions     T8 vertebral body fracture with anterior longitudinal ligament disruption   T6, T7 & T8 spinous process fractures              - Neurosurgery assisting with management              - Bedrest              - MRIs of the spine are unable to be completed as pacer/defib not compatible               - Neuro checks              - Maintain spinal neutrality              - Pain control   - Plan for kyphoplasty Monday morning     Hx of HTN, A-fib, DM2, CHF              - Hospitalist & cardiology assisting with management and surgical clearance     Pain control                   - Norco. Lidocaine, Morphine PRN     General diet, NPO after midnight  IVF hydration  Repeat labs in AM  Prophylaxis: SCDs, IS, C&DB, Pepcid, stool softeners  PT, OT, SLP eval and treat  Discharge disposition pending clinical course     Yuri Crane continues on 5K. He remains on bedrest at this time. He complains of pain with movement, but otherwise states that he does not have much pain.   He has not been out of bed yet. Planning of surgical intervention tomorrow morning, pending surgical clearance. He is tolerating a general diet. He will be kept NPO after midnight in preparation for surgery. Case discussed with Dr. Cate Wilson. Wt Readings from Last 3 Encounters:   12/19/21 201 lb (91.2 kg)   08/11/16 209 lb (94.8 kg)   02/16/16 209 lb 12.8 oz (95.2 kg)     Temp Readings from Last 3 Encounters:   12/19/21 98.1 °F (36.7 °C) (Oral)   02/16/16 96.4 °F (35.8 °C)     BP Readings from Last 3 Encounters:   12/19/21 137/75   02/16/16 134/84   02/11/16 124/72     Pulse Readings from Last 3 Encounters:   12/19/21 80   02/16/16 72   09/09/13 92       24 HR INTAKE/OUTPUT :     Intake/Output Summary (Last 24 hours) at 12/19/2021 0734  Last data filed at 12/18/2021 2030  Gross per 24 hour   Intake 447 ml   Output 1125 ml   Net -678 ml     ADULT DIET; Regular; 4 carb choices (60 gm/meal); Low Sodium (2 gm)    OBJECTIVE  CURRENT VITALS /75   Pulse 80   Temp 98.1 °F (36.7 °C) (Oral)   Resp 18   Ht 6' 1\" (1.854 m)   Wt 201 lb (91.2 kg)   SpO2 100%   BMI 26.52 kg/m²   GENERAL: alert, cooperative, no distress  LUNGS: clear to auscultation bilaterally- no wheezes, rales or rhonchi, normal air movement, no respiratory distress  HEART: normal rate, normal S1 and S2, no gallops, intact distal pulses and no carotid bruits  ABDOMEN: soft, non-tender, non-distended, normal bowel sounds, no masses or organomegaly  EXTREMITY: no cyanosis, no clubbing and no edema      LABS  CBC :   Recent Labs     12/18/21  0358 12/19/21  0621   WBC 6.3 5.9   HGB 12.4* 12.4*   HCT 39.1* 39.8*   MCV 98.7* 99.3*    192     BMP:   Recent Labs     12/18/21  0027      K 4.6   CL 98   CO2 24   BUN 51*   CREATININE 2.0*     COAGS:   Recent Labs     12/18/21 0027   APTT 31.0   PROT 6.9   INR 1.07     Pancreas/HFP:  No results for input(s): LIPASE, AMYLASE in the last 72 hours.   Recent Labs     12/18/21 0027   AST 15   ALT 14

## 2021-12-19 NOTE — CONSULTS
PACEMAKER INSERTION  2011    with Defibrillator    PACEMAKER INSERTION      STOMACH SURGERY  1975    Ulcer       Medications Prior to Admission:      Prior to Admission medications    Medication Sig Start Date End Date Taking? Authorizing Provider   acetaminophen (TYLENOL) 500 MG tablet Take 500 mg by mouth every 4 hours as needed for Pain   Yes Historical Provider, MD   Multiple Vitamin (MULTI-VITAMIN PO) Take by mouth daily    Historical Provider, MD   ramipril (ALTACE) 1.25 MG capsule Take 1.25 mg by mouth 2 times daily    Historical Provider, MD   senna (SENOKOT) 8.6 MG tablet Take 2 tablets by mouth daily    Historical Provider, MD   polyethylene glycol (GLYCOLAX) powder Take 17 g by mouth as needed    Historical Provider, MD   digoxin (LANOXIN) 125 MCG tablet Take 125 mcg by mouth daily. Historical Provider, MD   doxazosin (CARDURA) 4 MG tablet Take 4 mg by mouth nightly. Historical Provider, MD   bumetanide (BUMEX) 2 MG tablet Take 2 mg by mouth daily. Historical Provider, MD   clopidogrel (PLAVIX) 75 MG tablet Take 75 mg by mouth daily. Historical Provider, MD   Insulin Detemir (LEVEMIR SC) Inject  into the skin. Historical Provider, MD   carvedilol (COREG) 3.125 MG tablet Take 3.125 mg by mouth 2 times daily (with meals). Historical Provider, MD   levothyroxine (SYNTHROID) 150 MCG tablet Take 150 mcg by mouth Daily. Historical Provider, MD   amiodarone (CORDARONE) 200 MG tablet Take 200 mg by mouth daily.  Half tab    Historical Provider, MD       Current Facility-Administered Medications   Medication Dose Route Frequency Provider Last Rate Last Admin    benzocaine-menthol (CEPACOL SORE THROAT) lozenge 1 lozenge  1 lozenge Oral Q3H PRN MESSI Arce - CNP        sodium chloride flush 0.9 % injection 5-40 mL  5-40 mL IntraVENous 2 times per day MESSI Jarquin - CNP        sodium chloride flush 0.9 % injection 5-40 mL  5-40 mL IntraVENous PRN MESSI Jarquin - PRASHANT  0.9 % sodium chloride infusion  25 mL IntraVENous PRN Mozella Nimco, APRN - CNP        ondansetron (ZOFRAN-ODT) disintegrating tablet 4 mg  4 mg Oral Q8H PRN Mozella Nimco, APRN - CNP        Or    ondansetron [facility]) injection 4 mg  4 mg IntraVENous Q6H PRN Mozella Nimco, APRN - CNP        polyethylene glycol (GLYCOLAX) packet 17 g  17 g Oral Daily Mozella Nimco, APRN - CNP   17 g at 12/19/21 0756    fleet rectal enema 1 enema  1 enema Rectal Daily PRN Mozella Nimco, APRN - CNP        0.9 % sodium chloride infusion   IntraVENous Continuous Mozella Nimco, APRN - CNP 50 mL/hr at 12/18/21 0611 New Bag at 12/18/21 0611    acetaminophen (TYLENOL) tablet 650 mg  650 mg Oral Q4H PRN Mozella Nimco, APRN - CNP        morphine (PF) injection 2 mg  2 mg IntraVENous Q2H PRN Mozella Nimco, APRN - CNP        Or    morphine injection 4 mg  4 mg IntraVENous Q2H PRN Mozella Nimco, APRN - CNP        lidocaine 4 % external patch 3 patch  3 patch Topical Daily Mozella Nimco, APRN - CNP   3 patch at 12/19/21 0757    famotidine (PEPCID) tablet 20 mg  20 mg Oral Every Other Day Mozella Nimco, APRN - CNP   20 mg at 12/18/21 0930    cyclobenzaprine (FLEXERIL) tablet 10 mg  10 mg Oral TID PRN Mozella Nimco, APRN - CNP   10 mg at 12/19/21 0548    HYDROcodone-acetaminophen (NORCO) 5-325 MG per tablet 1 tablet  1 tablet Oral Q4H PRN Mozella Nimco, APRN - CNP   1 tablet at 12/19/21 0756    Or    HYDROcodone-acetaminophen (NORCO) 5-325 MG per tablet 2 tablet  2 tablet Oral Q4H PRN Mozella Nimco, APRN - CNP   2 tablet at 12/18/21 0930    insulin lispro (HUMALOG) injection vial 0-6 Units  0-6 Units SubCUTAneous TID  MESSI Canas - CNP   1 Units at 12/19/21 0756    insulin lispro (HUMALOG) injection vial 0-3 Units  0-3 Units SubCUTAneous Nightly Silvia A Rethman, APRN - CNP        glucose (GLUTOSE) 40 % oral gel 15 g  15 g Oral PRN Silvia Malone, APRN - CNP        dextrose 50 % IV solution 12.5 g IntraVENous PRN Silvia A PATTI MaloneN - CNP        glucagon (rDNA) injection 1 mg  1 mg IntraMUSCular PRN Silvia A Faisal, APRN - CNP        dextrose 5 % solution  100 mL/hr IntraVENous PRN Silvia Malone APRN - CNP           Allergies:  Flomax [tamsulosin hcl] and Tamsulosin    Social History:    TOBACCO:   reports that he quit smoking about 38 years ago. He has never used smokeless tobacco.  ETOH:   reports no history of alcohol use. Family History:        Problem Relation Age of Onset    Stroke Mother     Arthritis Father     Heart Disease Father          Review of Systems -   General ROS: negative  Psychological ROS: negative  Hematological and Lymphatic ROS: No history of blood clots or bleeding disorder. Respiratory ROS: no cough, shortness of breath, or wheezing  Cardiovascular ROS: As per HPI  Gastrointestinal ROS: negative  Genito-Urinary ROS: no dysuria, trouble voiding, or hematuria  Musculoskeletal ROS: negative  Neurological ROS: no TIA or stroke symptoms  Dermatological ROS: negative    All others reviewed and are negative.        BP (!) 151/78   Pulse 77   Temp 98.4 °F (36.9 °C) (Oral)   Resp 16   Ht 6' 1\" (1.854 m)   Wt 201 lb (91.2 kg)   SpO2 97%   BMI 26.52 kg/m²       Physical Examination:   General appearance - alert, in no distress  Mental status - alert, oriented to person, place, and time  Neck - supple, no significant adenopathy, no JVD, or carotid bruits  Chest - clear to auscultation, no wheezes, rales or rhonchi, symmetric air entry  Heart - normal rate, regular rhythm, normal S1, S2, no murmurs, rubs, clicks or gallops  Abdomen - soft, nontender, nondistended, no masses or organomegaly  Neurological - alert, oriented, normal speech, no focal findings or movement disorder noted  Musculoskeletal - no joint tenderness, deformity or swelling  Extremities - peripheral pulses normal, no pedal edema, no clubbing or cyanosis  Skin - normal coloration and turgor, no rashes, no suspicious skin lesions noted      LABS:    No results for input(s): CKTOTAL, CKMB, CKMBINDEX, TROPONINT in the last 72 hours. CBC:   Lab Results   Component Value Date    WBC 5.9 12/19/2021    RBC 4.01 12/19/2021    HGB 12.4 12/19/2021    HCT 39.8 12/19/2021    MCV 99.3 12/19/2021    MCH 30.9 12/19/2021    MCHC 31.2 12/19/2021    RDW 15.9 08/12/2013     12/19/2021    MPV 8.9 12/19/2021     BMP:    Lab Results   Component Value Date     12/19/2021    K 4.6 12/19/2021     12/19/2021    CO2 24 12/19/2021    BUN 45 12/19/2021    LABALBU 3.8 12/19/2021    CREATININE 1.8 12/19/2021    CALCIUM 9.1 12/19/2021    LABGLOM 36 12/19/2021    GLUCOSE 116 12/19/2021     Hepatic Function Panel:    Lab Results   Component Value Date    ALKPHOS 122 12/19/2021    ALT 10 12/19/2021    AST 14 12/19/2021    PROT 6.4 12/19/2021    BILITOT 0.9 12/19/2021    BILIDIR <0.2 12/18/2021    LABALBU 3.8 12/19/2021     Magnesium:  No results found for: MG  Warfarin PT/INR:  No components found for: PTPATWAR, PTINRWAR  HgBA1c:  No results found for: LABA1C  FLP:  No results found for: TRIG, HDL, LDLCALC, LDLDIRECT, LABVLDL  TSH:    Lab Results   Component Value Date    TSH 3.705 08/12/2013     BNP: No components found for: PRO-BNP      Assessment/Plan:    Patient Active Problem List   Diagnosis    HTN (hypertension)    DM [facility]di[facility] mellitus) [facility]    Chronic retention of urine, recurrent. Poss. neurgenic bladder from Diabetes.     Fall at home, initial encounter    Cardiomyopathy [facility])    Paroxysmal atrial fibrillation ([facility]    Stage 3 chronic kidney disease ([facility]    SunDown syndrome    Gastro-esophageal reflux disease without esophagitis    Biventricular implantable cardioverter-defibrillator (ICD) in situ    Closed fracture of eighth thoracic vertebra ([facility]     Briefly this is an 59-year-old man with nonischemic cardiomyopathy status post BiV ICD, A. fib, hypertension, diabetes, chronic left bundle branch block who was transferred from [facility] initially after a fall. Patient sustained thoracic vertebrae fracture and is currently being evaluated by neurosurgery for possible surgery. Cardiology was consulted for preoperative stratification. Patient denies any anginal or heart failure symptoms. Denies orthopnea PND or pedal edema. Reports greater than 4 METS of exercise tolerance  [facility] did send some information but the only information that they sent was recent op note of ICD change out. We will repeat echocardiogram in a.m. If no significant findings on the echo other than low EF  Patient may proceed with surgery at moderate risk for perioperative cardiac event  All risks and alternatives discussed with patient and family members  They agree with plan want to proceed with surgery if deemed necessary    Please do note hesitate to contact me for any further questions. Thank you for the opportunity to be involved in this patient's care.     Code Status: Full Code    Electronically signed by Rowan Alonso MD on 12/19/2021 at 12:08 PM

## 2021-12-19 NOTE — PROGRESS NOTES
Addendum by Dr. Kiet Hameed MD:     I have seen and examined the patient independently. Face to face evaluation and examination was performed. The below evaluation and note have been reviewed. Labs and radiographs were reviewed. I Have discussed with Neurosurgery PA about this patient in detail. The below assessment and plan have been reviewed. Please see my modifications mentioned below. My additional comments and modifications    ·           There is no  acute event over the night. ·           Patient is still complaining from severe back pain especially with movement. ·           There is no change in patient neurological exam comparing with yesterday. · He is not able to obtain MRIs because of his pacemaker. Patient recommendation and treatment plan from neurosurgical perspective at this time:     · Medical management per patient primary. · Pain control. · PT and OT as tolerated. - All labs were reviewed. Decision making:     Based on patient and his family current overall symptoms, the response of his pain to conservative treatment option and the findings of his lumbar spine imaging studies, I would recommend for the patient surgical intervention in the form of T8 kyphoplasty to help in controlling patient pain. - Today I discussed with patient a the recommended surgical intervention (T8 kyphoplasty or an any at that level as indicated during the surgery)  in detail including the associated risks and benefits, as well as, the alternative treatment options. - I discussed the possible complication of surgery with patient and his  family in detail including excessive blood loss requiring transfusion, infection that may become meningitis, problem with heart lung and kidney as a result of general anesthesia such as heart attack, pneumonia, kidney shutdown and stroke.  I also discussed the possible complication of the operations in and around the spine such as, death, new weakness on the other side of his body, decreased sensation or pain on one side or the other of the body, inability to control bowel/bladder, postoperative meningitis, postoperative development of a blood clot that may require another operation, leakage of fluid from the wound that may require another operation. The unlikely event of blindness following surgery in the prone position was also discussed with the patient. The patient understands that no guarantee can be made regarding the extent of post-operative pain relief. Also,  I discussed with them a specific complication for this procedure such as:  ·           As a consequence of electrosurgery, damage to surrounding tissue through iatrogenic injury could occur  ·           Pulmonary embolism  ·           Nerve injury including thermal injury, puncture of the spinal cord or nerve roots potentially results in radiculopathy, paresis, and paralysis. I emphasized to the patient that he may need another spine surgical intervention based on the development of his symptoms and his  upcoming spine medical studies  - All questions were answered.  -The above discussion were carried out in front of patient nurse and neurosurgery Destiny MCHUGH. - Patient  and his family agreed to undergo the recommended surgical intervention and signed the surgical consent.  -The plan now for patient is to schedule him for surgery based on the OR availability.  -Neurosurgery will follow. Mauricio Watkins MD     Neurosurgery Progress Note    Patient:  Corby Chaparro      Unit/Bed:[facility]-A    YOB: 1932    MRN: [id number]     Acct: [de-identified]     Admit date: 12/18/2021    Chief Complaint   Patient presents with    Trauma     CATEGORY II, FALL. Patient Seen, Chart, Physician notes, Labs, Radiology studies reviewed.     Subjective: Mr. Christianne Harris is seen and evaluated on the floor with evaluation and exam findings reviewed and discussed with Dr. Geronimo Vicente and with nursing. He is resting comfortably today with pain moderately controlled. He continues with thoracic pain along with tenderness to palpation of the thoracic spine consistent with fracture evidenced on CT scan at admission        Past, Family, Social History unchanged from admission. Diet:  ADULT DIET; Regular; 4 carb choices (60 gm/meal); Low Sodium (2 gm)  Diet NPO    Medications:  Scheduled Meds:   sodium chloride flush  5-40 mL IntraVENous 2 times per day    polyethylene glycol  17 g Oral Daily    lidocaine  3 patch Topical Daily    famotidine  20 mg Oral Every Other Day    insulin lispro  0-6 Units SubCUTAneous TID WC    insulin lispro  0-3 Units SubCUTAneous Nightly     Continuous Infusions:   sodium chloride      sodium chloride 50 mL/hr at 12/18/21 0611    dextrose       PRN Meds:sodium chloride flush, sodium chloride, ondansetron **OR** ondansetron, fleet, acetaminophen, morphine **OR** morphine, cyclobenzaprine, HYDROcodone 5 mg - acetaminophen **OR** HYDROcodone 5 mg - acetaminophen, glucose, dextrose, glucagon (rDNA), dextrose    Objective: He is observed lying in bed with head of the bed elevated 30 degrees with pain moderately controlled. He continues with tenderness to palpation of the thoracic spine consistent with thoracic compression fracture evidenced on CT scan at admission. Positional pain is also noted but he is otherwise stable and intact to his baseline on admission with no additional significant changes noted overnight    Vitals: BP (!) 151/78   Pulse 77   Temp 98.4 °F (36.9 °C) (Oral)   Resp 16   Ht 6' 1\" (1.854 m)   Wt 201 lb (91.2 kg)   SpO2 97%   BMI 26.52 kg/m²     Physical Exam     Physical Exam:  Alert and attentive. Language appropriate, with no aphasia. Pupils equal.  Facial strength symmetric. Review of Systems   Constitutional: Positive for activity change. Respiratory: Negative for apnea, chest tightness and shortness of breath. Cardiovascular: Negative for chest pain and leg swelling. Gastrointestinal: Negative for abdominal distention and abdominal pain. Musculoskeletal: Positive for back pain. Neurological: Negative for dizziness, weakness, numbness and headaches. Psychiatric/Behavioral: Negative for agitation, behavioral problems, confusion and decreased concentration. 24 hour intake/output:    Intake/Output Summary (Last 24 hours) at 12/19/2021 1100  Last data filed at 12/18/2021 2030  Gross per 24 hour   Intake 447 ml   Output 1125 ml   Net -678 ml     Last 3 weights: Wt Readings from Last 3 Encounters:   12/19/21 201 lb (91.2 kg)   08/11/16 209 lb (94.8 kg)   02/16/16 209 lb 12.8 oz (95.2 kg)         CBC:   Recent Labs     12/18/21  0358 12/19/21  0621   WBC 6.3 5.9   HGB 12.4* 12.4*    192     BMP:    Recent Labs     12/18/21  0027 12/19/21  0621    141   K 4.6 4.6   CL 98 103   CO2 24 24   BUN 51* 45*   CREATININE 2.0* 1.8*   GLUCOSE 103 116*     Calcium:  Recent Labs     12/19/21  0621   CALCIUM 9.1     Magnesium:No results for input(s): MG in the last 72 hours. Glucose:  Recent Labs     12/18/21  1746 12/18/21  1951 12/19/21  0742   POCGLU 93 97 142*     HgbA1C: No results for input(s): LABA1C in the last 72 hours. Lipids: No results for input(s): CHOL, TRIG, HDL, LDLCALC in the last 72 hours. Invalid input(s): LDL    Radiology reports as per the Radiologist  Radiology: XR CHEST PORTABLE    Result Date: 12/18/2021  PROCEDURE: XR CHEST PORTABLE CLINICAL INFORMATION: Trauma . TECHNIQUE: Portable semiupright COMPARISON: No prior study. FINDINGS: Heart and mediastinal and hilar contours are within normal limits. No infiltrates or effusions. Vessels are not congested left-sided AICD. EKG leads overlie the chest.     No acute disease **This report has been created using voice recognition software. It may contain minor errors which are inherent in voice recognition technology. ** Final report electronically signed by Dr. Shad Hillman on 12/18/2021 12:42 AM    CT INTERPRETATION OF OUTSIDE IMAGES    Result Date: 12/18/2021  CT thoracic spine without IV contrast. Comparison: None Findings: Normal alignment. Osteopenia. Acute oblique nondisplaced fracture through the mid and inferior aspects of the T8 vertebral body. No significant height loss or retropulsion. Additional acute, oblique fracture through the T8 spinous process. Disruption of anterior longitudinal ligament calcification at the T8 level and therefore ligamentous disruption cannot be excluded. Multilevel degenerative disc space narrowing and hypertrophic spurring. Prominent ligamentous calcifications. No high grade canal stenosis noted. No paraspinal soft tissue hematoma. Visualized lung fields without acute pathology. Moderate cardiomegaly. Dense atheromatous coronary vascular calcifications. Acute , oblique nondisplaced fracture through the mid and inferior aspects of the T8 vertebral body. No significant height loss or retropulsion. Additional acute, oblique fracture through the T8 spinous process. Disruption of anterior longitudinal ligament calcification at the T8 level and therefore ligamentous disruption cannot be excluded. This document has been electronically signed by: German Peguero MD on 12/18/2021 04:17 AM All CTs at this facility use dose modulation techniques and iterative reconstructions, and/or weight-based dosing when appropriate to reduce radiation to a low as reasonably achievable. CT INTERPRETATION OF OUTSIDE IMAGES    Result Date: 12/18/2021  PROCEDURE: CT INTERPRETATION OF OUTSIDE IMAGES CLINICAL INFORMATION: trauma transfer . TECHNIQUE: 2-D multiplanar reconstructed images of the lumbar spine All CT scans at this facility use dose modulation, iterative reconstruction, and/or weight-based dosing when appropriate to reduce radiation dose to as low as reasonably achievable. COMPARISON: No prior study.  FINDINGS: No level in the right maxillary sinus postsurgical changes of the sinus. Radiopaque foreign body posterior to the right maxillary sinus postsurgical. Other visualized paranasal sinuses and mastoid is clear. No acute process **This report has been created using voice recognition software. It may contain minor errors which are inherent in voice recognition technology. ** Final report electronically signed by Dr. Mirna Brennan on 12/18/2021 12:56 AM                   Assessment: Compression fracture of thoracic 8 along with multiple spinous process fractures of the thoracic spine    Active Problems:    Fall at home, initial encounter    Closed fracture of eighth thoracic vertebra ([address]  Resolved Problems:    * No resolved hospital problems. *        Plan: Patient is seen and evaluated on the floor with evaluation and exam findings reviewed and discussed with Dr. Clarissa Parisi and with nursing. Patient is resting more comfortably today with pain moderately controlled. He continues with positional back pain and tenderness to palpation of the thoracic spine consistent with compression fracture and spinous process fractures evidenced on recent CT scanning. Based on findings on exam and on imaging neurosurgery feels that it is reasonable to offer surgical intervention in the form of kyphoplasty. We have discussed the procedure in detail along with expectations for recovery and the associated risks which include, but are not limited to; bleeding, infection, anesthetic complications, neurologic injury, incomplete relief of symptoms and even death. Understanding the risk associated procedure he is amicable to moving forward with a consent offered for his signature and surgery scheduling in progress. In anticipation of possible placement on the morning surgical schedule, the patient will be placed n.p.o. at midnight. A coagulation profile including TEG has been ordered in anticipation of tomorrow's procedure. Neurosurgery to follow.       Electronically signed by Jocelynn Kapoor PA-C on 12/19/2021 at 11:00 AM    Neurosurgery

## 2021-12-20 LAB
GLUCOSE BLD-MCNC: 130 MG/DL (ref 70–108)
GLUCOSE BLD-MCNC: 138 MG/DL (ref 70–108)
GLUCOSE BLD-MCNC: 151 MG/DL (ref 70–108)
GLUCOSE BLD-MCNC: 171 MG/DL (ref 70–108)

## 2021-12-20 PROCEDURE — 82948 REAGENT STRIP/BLOOD GLUCOSE: CPT

## 2021-12-20 PROCEDURE — 99231 SBSQ HOSP IP/OBS SF/LOW 25: CPT | Performed by: NEUROLOGICAL SURGERY

## 2021-12-20 PROCEDURE — 1200000003 HC TELEMETRY R&B

## 2021-12-20 PROCEDURE — 6370000000 HC RX 637 (ALT 250 FOR IP): Performed by: NURSE PRACTITIONER

## 2021-12-20 PROCEDURE — 99232 SBSQ HOSP IP/OBS MODERATE 35: CPT | Performed by: NURSE PRACTITIONER

## 2021-12-20 PROCEDURE — APPSS30 APP SPLIT SHARED TIME 16-30 MINUTES: Performed by: PHYSICIAN ASSISTANT

## 2021-12-20 PROCEDURE — 99232 SBSQ HOSP IP/OBS MODERATE 35: CPT | Performed by: SURGERY

## 2021-12-20 PROCEDURE — 92523 SPEECH SOUND LANG COMPREHEN: CPT

## 2021-12-20 RX ORDER — INSULIN GLARGINE 100 [IU]/ML
6 INJECTION, SOLUTION SUBCUTANEOUS NIGHTLY
Status: DISCONTINUED | OUTPATIENT
Start: 2021-12-20 | End: 2021-12-31 | Stop reason: HOSPADM

## 2021-12-20 RX ORDER — CARVEDILOL 6.25 MG/1
6.25 TABLET ORAL 2 TIMES DAILY WITH MEALS
Status: DISCONTINUED | OUTPATIENT
Start: 2021-12-20 | End: 2021-12-31 | Stop reason: HOSPADM

## 2021-12-20 RX ORDER — ALOGLIPTIN 12.5 MG/1
12.5 TABLET, FILM COATED ORAL DAILY
Status: DISCONTINUED | OUTPATIENT
Start: 2021-12-20 | End: 2021-12-31 | Stop reason: HOSPADM

## 2021-12-20 RX ORDER — ATORVASTATIN CALCIUM 40 MG/1
40 TABLET, FILM COATED ORAL NIGHTLY
Status: DISCONTINUED | OUTPATIENT
Start: 2021-12-20 | End: 2021-12-31 | Stop reason: HOSPADM

## 2021-12-20 RX ORDER — TORSEMIDE 20 MG/1
20 TABLET ORAL 2 TIMES DAILY
Status: DISCONTINUED | OUTPATIENT
Start: 2021-12-20 | End: 2021-12-24

## 2021-12-20 RX ORDER — MIDODRINE HYDROCHLORIDE 5 MG/1
5 TABLET ORAL
Status: DISCONTINUED | OUTPATIENT
Start: 2021-12-20 | End: 2021-12-24

## 2021-12-20 RX ORDER — GLIPIZIDE 5 MG/1
5 TABLET ORAL
Status: DISCONTINUED | OUTPATIENT
Start: 2021-12-21 | End: 2021-12-31 | Stop reason: HOSPADM

## 2021-12-20 RX ORDER — LEVOTHYROXINE SODIUM 0.15 MG/1
150 TABLET ORAL DAILY
Status: DISCONTINUED | OUTPATIENT
Start: 2021-12-20 | End: 2021-12-31 | Stop reason: HOSPADM

## 2021-12-20 RX ADMIN — ATORVASTATIN CALCIUM 40 MG: 40 TABLET, FILM COATED ORAL at 21:38

## 2021-12-20 RX ADMIN — CYCLOBENZAPRINE 10 MG: 10 TABLET, FILM COATED ORAL at 17:42

## 2021-12-20 RX ADMIN — HYDROCODONE BITARTRATE AND ACETAMINOPHEN 2 TABLET: 5; 325 TABLET ORAL at 17:42

## 2021-12-20 RX ADMIN — HYDROCODONE BITARTRATE AND ACETAMINOPHEN 2 TABLET: 5; 325 TABLET ORAL at 14:35

## 2021-12-20 RX ADMIN — CARVEDILOL 6.25 MG: 6.25 TABLET, FILM COATED ORAL at 12:29

## 2021-12-20 RX ADMIN — HYDROCODONE BITARTRATE AND ACETAMINOPHEN 2 TABLET: 5; 325 TABLET ORAL at 09:12

## 2021-12-20 RX ADMIN — INSULIN GLARGINE 6 UNITS: 100 INJECTION, SOLUTION SUBCUTANEOUS at 21:36

## 2021-12-20 RX ADMIN — CYCLOBENZAPRINE 10 MG: 10 TABLET, FILM COATED ORAL at 09:12

## 2021-12-20 RX ADMIN — HYDROCODONE BITARTRATE AND ACETAMINOPHEN 2 TABLET: 5; 325 TABLET ORAL at 21:40

## 2021-12-20 RX ADMIN — LEVOTHYROXINE SODIUM 150 MCG: 0.15 TABLET ORAL at 12:29

## 2021-12-20 RX ADMIN — CARVEDILOL 6.25 MG: 6.25 TABLET, FILM COATED ORAL at 17:43

## 2021-12-20 RX ADMIN — INSULIN LISPRO 1 UNITS: 100 INJECTION, SOLUTION INTRAVENOUS; SUBCUTANEOUS at 21:36

## 2021-12-20 ASSESSMENT — PAIN SCALES - GENERAL
PAINLEVEL_OUTOF10: 7
PAINLEVEL_OUTOF10: 4
PAINLEVEL_OUTOF10: 7
PAINLEVEL_OUTOF10: 7
PAINLEVEL_OUTOF10: 9
PAINLEVEL_OUTOF10: 5
PAINLEVEL_OUTOF10: 3
PAINLEVEL_OUTOF10: 5
PAINLEVEL_OUTOF10: 3

## 2021-12-20 NOTE — PROGRESS NOTES
[facility]  SPEECH THERAPY  STRZ ONC MED 5K  Speech - Language - Cognitive Evaluation    SLP Individual Minutes  Time In: 5848  Time Out: 3821  Minutes: 18  Timed Code Treatment Minutes: 0 Minutes       Date: 2021  Patient Name: Stephanie Bauer      CSN: [id number]   : 10/25/1932  (80 y.o.)  Gender: male   Referring Physician:  MESSI Jarquin CNP  Diagnosis: Fall at home, initial encounter  Secondary Diagnosis: Cognitive deficits  Precautions: Fall risk  History of Present Illness/Injury: Patient admitted to [facility] with above med dx; please refer to physician H&P for full details. Per chart review, \"80 year old male presenting to the Emergency Department as a transfer in from [facility] for evaluation and treatment of a T8 vertebral body fracture from a fall sustained 3 days prior. He reports that he was experiencing increasing back pain so he went to the outlying ER for evaluation where they found a fracture of the T8 vertebral body. \"     CT head:  Impression   No acute process     ST consulted to complete cognitive linguistic evaluation s/p fall to assist with development of POC as appropriate. Past Medical History:   Diagnosis Date    Arthritis     CHF (congestive heart failure) (HCC)     Constipation     Hypertension     Thyroid disease     Type II or unspecified type diabetes mellitus without mention of complication, not stated as uncontrolled        Pain: No pain reported. Subjective:  LASHAUN Guevara with approval to proceed with evaluation. Upon arrival, patient resting in bed with spouse and daughter at bedside; much support provided from family. Family indicates recent decline in cognitive performance recently, additionally confirming that within fall, patient \"hit the back of his head\".      SOCIAL HISTORY:   Living Arrangements: [place] with spouse; multiple family members in the immediate area to provide supplemental assistance should be required  Work History: Retired; [employer]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 Hobbies: Golfing, reading   Vision Status: Glasses  Hearing: Bilateral hearing aids; not present at time of evaluation        ORAL MOTOR:  Facial / Labial WFL    Lingual WFL    Dentition WFL    Velum WFL    Vocal Quality WFL    Sensation WFL    Cough Not Tested      SPEECH / VOICE:  Speech and Voice appear to be grossly intact for basic and complex daily communication    LANGUAGE:  Receptive:  1 Step Commands: 3/3  2 Step Commands: 2/2  Simple Yes/No Questions: 3/3  Complex Yes/No Questions: 3/3    Expressive:  Automatic Speech: WFL numerical counting 1-10  Sentence Completion (open-ended): 3/3  Confrontational Naming: 3/3  Responsive Namin/2  Sentence Formulation: Intact for basic wants/needs  Conversational Speech: Impaired thought organization   Paraphasias: None evidenced  Repetition: 0/2 sentence level *impaired recall    COGNITION:  Duncan Cognitive Assessment (MOCA) version 7.1 completed. Pt scored 12/30. Normal is greater than or equal to 26/30. Inclusion of +1 point given highest level of education achieved less than/equal to 12th grade or GED with limited-0 post-secondary schooling   Orientation: 3/6  Immediate Recall: 2/5  Short-Term Recall: 0/5  Divergent Namin items named in 1 minute time frame (target-11)  Problem Solvin/3  Reasonin/2 verbal  Sequencin/1  Thought Organization: Impaired  Insight: Adequate  Attention: Adequate sustained attention   Math Computation: 0/1 serial subtraction   Executive Functionin/5    SWALLOWING:  Current Diet: NPO d/t potential kyphoplasty at date. RN Harjeet Powers reporting no concerns r/t swallow function with baseline diet of regular solids+thin liquids.      RECOMMENDATIONS/ASSESSMENT:  DIAGNOSTIC IMPRESSIONS: Patient presents with a moderate cognitive deficit d/t impaired temporal orientation, immediate/delayed recall, safety awareness, problem solving, sequential analysis, verbal/visual reasoning, math computation, executive functioning, thought organization, processing speed, and mental flexibility. Expressive and receptive language measures grossly intact at the basic level with no apparent communicative breakdowns. Concerns for some degree of a pre-morbid cognitive impairment per familial intake/social hx report; low cognitive demand reported in home environment. Skilled ST services are recommended to address the aforementioned deficits within a functional context to optimize safety and performance within current+future settings. Rehabilitation Potential: fair    EDUCATION:  Learner: Patient, Significant Other and Family  Education:  Reviewed ST goals and Plan of Care and Reviewed recommendations for follow-up  Evaluation of Education: Kathy Cervantes understanding, Demonstrates with assistance and Needs further instruction    PLAN:  Skilled SLP intervention on acute care 3-5 x per week or until goals met and/or pt plateaus in function. Specific interventions for next session may include: cognitive tasks. PATIENT GOAL:    Did not state. Will further assess during treatment. SHORT TERM GOALS:  Short-term Goals  Timeframe for Short-term Goals: 2 weeks  Goal 1: Patient will complete FUNCTIONAL immediate/delayed recall tasks (including orientation) with inclusion of compensatory strategies with 60% accuracy, mod cues to improve retention of pertinent information. Goal 2: Patient will complete safety awareness, problem solving, sequencing, and basic verbal/visual reasoning (medical, home-related) tasks with 70% accuracy, mod cues to improve contributions within ADLs.   Goal 3: Patient will complete FUNCTIONAL, concrete thought organization and executive functioning (time, basic money/math) tasks with 60% accuracy, mod cues to improve mental flexibility for daily living scenarios.     LONG TERM GOALS:  No LTG established given short ELOS      Charbel Omer M.A., [address] [facility]

## 2021-12-20 NOTE — PROGRESS NOTES
Addendum by Dr. Neymar Brannon MD:     I have seen and examined the patient independently. Face to face evaluation and examination was performed. The below evaluation and note have been reviewed. Labs and radiographs were reviewed. I Have discussed with Neurosurgery PA about this patient in detail. The below assessment and plan have been reviewed. Please see my modifications mentioned below.      My additional comments and modifications     ·           There is no  acute event over the night. ·           SCEXHKB is still complaining from severe back pain especially with movement. ·           There is no change in patient neurological exam comparing with yesterday. ·       Today planned surgery was postponed till tomorrow. ·        Please keep the patient NPO after midnight. ·        Spoke with and patient and his family. Neymar Brannon MM     Neurosurgery Progress Note    Patient:  Rosette Sevilla      Unit/Bed:5-01/001-A    YOB: 1932    MRN: [id number]     Acct: [de-identified]     Admit date: 12/18/2021    Chief Complaint   Patient presents with    Trauma     CATEGORY II, FALL. Patient Seen, Chart, Physician notes, Labs, Radiology studies reviewed. Subjective: The patient is seen and evaluated on the floor with evaluation and exam findings reviewed discussed with Dr. Joi Patel and with nursing. Patient is resting comfortably with pain moderately controlled. Complaints of positional back pain along with tenderness to palpation of the thoracic spine consistent with known fractures is evidence on exam.        Past, Family, Social History unchanged from admission.     Diet:  Diet NPO    Medications:  Scheduled Meds:   sodium chloride flush  5-40 mL IntraVENous 2 times per day    polyethylene glycol  17 g Oral Daily    lidocaine  3 patch Topical Daily    famotidine  20 mg Oral Every Other Day    insulin lispro  0-6 Units SubCUTAneous TID     insulin lispro  0-3 Units SubCUTAneous Nightly     Continuous Infusions:   sodium chloride      sodium chloride 75 mL/hr at 12/19/21 1514    dextrose       PRN Meds:benzocaine-menthol, sodium chloride flush, sodium chloride, ondansetron **OR** ondansetron, fleet, acetaminophen, cyclobenzaprine, HYDROcodone 5 mg - acetaminophen **OR** HYDROcodone 5 mg - acetaminophen, glucose, dextrose, glucagon (rDNA), dextrose    Objective: Patient is observed lying in bed with the head of the bed elevated 30 degrees and is comfortable with pain moderately controlled. Complaints of positional back pain exacerbated by motion along with tenderness to palpation of the thoracic spine consistent with known fractures is noted on exam.  No additional significant changes noted to the patient chart overnight. Vitals: /76   Pulse 83   Temp 98.5 °F (36.9 °C) (Oral)   Resp 16   Ht 6' 1\" (1.854 m)   Wt 200 lb (90.7 kg)   SpO2 96%   BMI 26.39 kg/m²     Physical Exam   The patient remained stable and intact his baseline on exam without any additional or significant changes noted the patient chart overnight    Physical Exam:  Alert and attentive. Language appropriate, with no aphasia. Pupils equal.  Facial strength symmetric. Review of Systems     Constitutional: Positive for activity change. Respiratory: Negative for apnea, chest tightness and shortness of breath. Cardiovascular: Negative for chest pain and leg swelling. Gastrointestinal: Negative for abdominal distention and abdominal pain. Musculoskeletal: Positive for back pain. Neurological: Negative for dizziness, weakness, numbness and headaches. Psychiatric/Behavioral: Negative for agitation, behavioral problems, confusion and decreased concentration. 24 hour intake/output:    Intake/Output Summary (Last 24 hours) at 12/20/2021 0733  Last data filed at 12/20/2021 5379  Gross per 24 hour   Intake 1592 ml   Output 2450 ml   Net -858 ml     Last 3 weights:   Wt Readings from Last 3 Encounters:   12/20/21 200 lb (90.7 kg)   08/11/16 209 lb (94.8 kg)   02/16/16 209 lb 12.8 oz (95.2 kg)         CBC:   Recent Labs     12/18/21  0358 12/19/21  0621   WBC 6.3 5.9   HGB 12.4* 12.4*    192     BMP:    Recent Labs     12/18/21  0027 12/19/21  0621    141   K 4.6 4.6   CL 98 103   CO2 24 24   BUN 51* 45*   CREATININE 2.0* 1.8*   GLUCOSE 103 116*     Calcium:  Recent Labs     12/19/21  0621   CALCIUM 9.1     Magnesium:No results for input(s): MG in the last 72 hours. Glucose:  Recent Labs     12/19/21  1232 12/19/21  1609 12/19/21  1951   POCGLU 163* 181* 174*     HgbA1C: No results for input(s): LABA1C in the last 72 hours. Lipids: No results for input(s): CHOL, TRIG, HDL, LDLCALC in the last 72 hours. Invalid input(s): LDL    Radiology reports as per the Radiologist  Radiology: XR CHEST PORTABLE    Result Date: 12/18/2021  PROCEDURE: XR CHEST PORTABLE CLINICAL INFORMATION: Trauma . TECHNIQUE: Portable semiupright COMPARISON: No prior study. FINDINGS: Heart and mediastinal and hilar contours are within normal limits. No infiltrates or effusions. Vessels are not congested left-sided AICD. EKG leads overlie the chest.     No acute disease **This report has been created using voice recognition software. It may contain minor errors which are inherent in voice recognition technology. ** Final report electronically signed by Dr. Cristo Gomes on 12/18/2021 12:42 AM    CT INTERPRETATION OF OUTSIDE IMAGES    Result Date: 12/18/2021   CT thoracic spine without IV contrast. Comparison: None Findings: Normal alignment. Osteopenia. Acute oblique nondisplaced fracture through the mid and inferior aspects of the T8 vertebral body. No significant height loss or retropulsion. Additional acute, oblique fracture through the T8 spinous process. Disruption of anterior longitudinal ligament calcification at the T8 level and therefore ligamentous disruption cannot be excluded.  Multilevel degenerative disc space narrowing and hypertrophic spurring. Prominent ligamentous calcifications. No high grade canal stenosis noted. No paraspinal soft tissue hematoma. Visualized lung fields without acute pathology. Moderate cardiomegaly. Dense atheromatous coronary vascular calcifications. Acute , oblique nondisplaced fracture through the mid and inferior aspects of the T8 vertebral body. No significant height loss or retropulsion. Additional acute, oblique fracture through the T8 spinous process. Disruption of anterior longitudinal ligament calcification at the T8 level and therefore ligamentous disruption cannot be excluded. This document has been electronically signed by: Lucho Belcher MD on 12/18/2021 04:17 AM All CTs at this facility use dose modulation techniques and iterative reconstructions, and/or weight-based dosing when appropriate to reduce radiation to a low as reasonably achievable. CT INTERPRETATION OF OUTSIDE IMAGES    Result Date: 12/18/2021  PROCEDURE: CT INTERPRETATION OF OUTSIDE IMAGES CLINICAL INFORMATION: trauma transfer . TECHNIQUE: 2-D multiplanar reconstructed images of the lumbar spine All CT scans at this facility use dose modulation, iterative reconstruction, and/or weight-based dosing when appropriate to reduce radiation dose to as low as reasonably achievable. COMPARISON: No prior study. FINDINGS: No acute fracture or acute alignment malalignment. Severe degenerative changes of the disks and marginal osteophytes. Bones are osteopenic. Incidental note is made of a distention of the urinary bladder partially imaged. No acute process **This report has been created using voice recognition software. It may contain minor errors which are inherent in voice recognition technology. ** Final report electronically signed by Dr. Jarvis Cortez on 12/18/2021 1:00 AM    CT INTERPRETATION OF OUTSIDE IMAGES    Result Date: 12/18/2021  PROCEDURE: CT INTERPRETATION OF OUTSIDE IMAGES CLINICAL INFORMATION: trauma transfer . TECHNIQUE: 2-D multiplanar noncontrast images of the cervical spine done at [facility]). All CT scans at this facility use dose modulation, iterative reconstruction, and/or weight-based dosing when appropriate to reduce radiation dose to as low as reasonably achievable. COMPARISON: No prior study. FINDINGS: No acute fracture or acute bony malalignment. Severe degenerative changes throughout. Osteopenia. No precervical soft tissue swelling. No acute process **This report has been created using voice recognition software. It may contain minor errors which are inherent in voice recognition technology. ** Final report electronically signed by Dr. Mirna Brennan on 12/18/2021 12:59 AM    CT INTERPRETATION OF OUTSIDE IMAGES    Result Date: 12/18/2021  PROCEDURE: CT INTERPRETATION OF OUTSIDE IMAGES CLINICAL INFORMATION: trauma transfer  . TECHNIQUE: 2-D multiplanar noncontrast images of the brain done at [facility]). All CT scans at this facility use dose modulation, iterative reconstruction, and/or weight-based dosing when appropriate to reduce radiation dose to as low as reasonably achievable. COMPARISON: No prior study. FINDINGS: Marked generalized cerebral volume loss. Ventricular megaly consistent with volume loss. No evidence of acute hemorrhage. No acute infarct seen. Calvarium is intact. Small air-fluid level in the right maxillary sinus postsurgical changes of the sinus. Radiopaque foreign body posterior to the right maxillary sinus postsurgical. Other visualized paranasal sinuses and mastoid is clear. No acute process **This report has been created using voice recognition software. It may contain minor errors which are inherent in voice recognition technology. ** Final report electronically signed by Dr. Mirna Brennan on 12/18/2021 12:56 AM                   Assessment: Compression fracture of thoracic 8 along with multiple spinous process fractures of the thoracic spine.     Active Problems:    Fall at home, initial encounter    Closed fracture of eighth thoracic vertebra [facility])  Resolved Problems:    * No resolved hospital problems. *        Plan: Mr. Bo Og is seen and evaluated on the floor with evaluation and exam findings reviewed and discussed with Dr. Sheryl Cordero and with nursing. He is resting comfortably and is tolerated n.p.o. at midnight in anticipation of possible add-on for kyphoplasty procedure with Dr. Sheryl Cordero for later this afternoon. Patient continues with positional back pain and tenderness to palpation of the thoracic spine consistent with thoracic compression fracture and multiple spinous process fractures evidenced on recent imaging. He is otherwise stable and intact his baseline with no additional significant changes noted overnight. PEG performed yesterday is reviewed with elevated angle tag and maximum clot tag at 74.5 and 72.8 respectively. Neurosurgery will provide an update when surgery scheduling is formalized.   Neurosurgery to follow      Electronically signed by Irwin Monte PA-C on 12/20/2021 at 7:33 AM    Neurosurgery

## 2021-12-20 NOTE — CARE COORDINATION
DISCHARGE/PLANNING EVALUATION  12/20/21, 11:01 AM EST    Reason for Referral: HH vs SNF at discharge-therapy to evaluate post op  Mental Status: alert and oriented  Decision Making: patient is able to participate in decision making. Family/Social/Home Environment: Spoke with patient, spouse and daughter re: home situation and discharge plans. Spouse appears to be in good health. She manages the meals, housekeeping and transportation in the [facility]-outside the area, children will help. Home is 1 story-daughter states that the home is wheelchair accessible-there are no steps, bathroom has a walk in shower and the toilet is higher. Patient states that he is able to get himself in and out of the shower and that he is independent with his bathing. Current Services including food security, transportation and housekeeping: no issues identified. Current Equipment: c-pap, walker and shower seat. Payment Source: [employer] Medicare  Concerns or Barriers to Discharge: none indicated  Post acute provider list with quality measures, geographic area and applicable managed care information provided. Questions regarding selection process answered: not at this time. Teach Back Method used with patient and family members regarding care plan   Patient and family mfembers verbalize understanding of the plan of care and contribute to goal setting. Patient goals, treatment preferences and discharge plan: Patient admitted due to a T8 vertebral fracture as a result of a fall. He is scheduled to have kyphoplasty later today. Patient will need therapy. Discharge options discussed-HH vs SNF for rehab stay. In-patient rehab is not an option due to lack of availability. At this time, plan is to wait for therapy to assess post procedure for recommendations. Patient and family in agreement.      Electronically signed by JAIME Muro on 12/20/2021 at 11:01 AM

## 2021-12-20 NOTE — PROGRESS NOTES
Order for echocardiogram cancelled by Dr Nahomy Mcarthur.   Previous study done on 7- at [facility]

## 2021-12-20 NOTE — PROGRESS NOTES
Hospitalist Progress Note    Patient:  Jessica Peñaloza      Unit/Bed:5K-01/001-A    YOB: 1932    MRN: [id number]       Acct: [de-identified]     PCP: Lyric Ribeiro    Date of Admission: 12/18/2021    Assessment/Plan:    1. T8 vertebral body fracture with anterior longitudinal ligament disruption--per neurosurgery; unable to have MRI secondary to the pacemaker not being compatible; neurosurgery planning kyphoplasty   2. T6, T7 and T8 spinous process fracture--per neurosurgery  3. History of atrial fibrillation--EKG showing paced rhythm; follows with Dr. Anna Leblanc as his cardiologist; medications updated  4. 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 Essential hypertension, uncontrolled--need to clarify his home medications as he has Altace and Coreg listed  6. Diabetes mellitus type 2, uncontrolled--low-dose sliding scale; Levemir dose clarified and restarted, will hold hypoglycemics at this time  7. History of heart failure--please be very cautious with IV fluids     Expected discharge date: Per primary and clinical course    Disposition:    [x] Home       [] TCU       [] Rehab       [] Psych       [] SNF       [] [place]       [] Other-    Chief Complaint: Fall at home    Hospital Course:   The patient is a 80 y.o. male whom I have been requested to see by Dr. Peyton Arnold for evaluation of preop evaluation medical management; this patient has a past medical history of hypertension, atrial fibrillation, diabetes and heart failure; his cardiologist is Dr. Anna Leblanc from [facility]; he has a pacemaker and awaiting records to decide if it is MRI compatible to further evaluate his thoracic spine fracture; this patient was a transfer in from [facility] after he fell 3 days ago and was experiencing increased back pain; he is a very poor historian and no family at the bedside, he denies hitting his head and denies any loss of consciousness; currently he is awake, knows Samuel is coming and can state its 2021 however he is not sure where he is; patient states he does not take any medications at home however meds are listed we need to confirm this with the family; he offers no complaints to me at this time.     12/19--> unable to have MRI secondary to the pacemaker being not compatible, I spoke to neurosurgery and reviewed the note and planning kyphoplasty in the morning    12/20--> wife and daughter at bedside, medication list updated with daughter's assistant, hemodynamically stable    Subjective (past 24 hours): No pain at rest however RN states any movement his pain is quite severe and family confirms    Medications:  Reviewed    Infusion Medications    sodium chloride      sodium chloride 75 mL/hr at 12/19/21 1514    dextrose       Scheduled Medications    sodium chloride flush  5-40 mL IntraVENous 2 times per day    polyethylene glycol  17 g Oral Daily    lidocaine  3 patch Topical Daily    famotidine  20 mg Oral Every Other Day    insulin lispro  0-6 Units SubCUTAneous TID WC    insulin lispro  0-3 Units SubCUTAneous Nightly     PRN Meds: benzocaine-menthol, sodium chloride flush, sodium chloride, ondansetron **OR** ondansetron, fleet, acetaminophen, cyclobenzaprine, HYDROcodone 5 mg - acetaminophen **OR** HYDROcodone 5 mg - acetaminophen, glucose, dextrose, glucagon (rDNA), dextrose      Intake/Output Summary (Last 24 hours) at 12/20/2021 3772  Last data filed at 12/20/2021 7817  Gross per 24 hour   Intake 1592 ml   Output 2450 ml   Net -858 ml       Diet:  Diet NPO    Exam:  /76   Pulse 83   Temp 98.5 °F (36.9 °C) (Oral)   Resp 16   Ht 6' 1\" (1.854 m)   Wt 200 lb (90.7 kg)   SpO2 96%   BMI 26.39 kg/m²     General appearance: No apparent distress, appears stated age and cooperative. HEENT: Pupils equal, round, and reactive to light.  Conjunctivae/corneas clear.  Neck: Supple, with full range of motion. No jugular venous distention. Trachea midline. Respiratory:  Normal respiratory effort. Clear to auscultation, bilaterally without Rales/Wheezes/Rhonchi. Cardiovascular: Regular rate and rhythm with normal S1/S2 without murmurs, rubs or gallops. Abdomen: Soft, non-tender, non-distended with normal bowel sounds. Musculoskeletal: passive and active ROM x 4 extremities~wiggles all fingers and toes. Skin: Skin color, texture, turgor normal.    Neurologic:  Neurovascularly intact without any focal sensory/motor deficits. Cranial nerves: II-XII intact, grossly non-focal.  Psychiatric: Alert and oriented to name and birthdate however I did just wake him up  Capillary Refill: Brisk,< 3 seconds   Peripheral Pulses: +2 palpable, equal bilaterally     Labs:   Recent Labs     12/18/21  0358 12/19/21 0621   WBC 6.3 5.9   HGB 12.4* 12.4*   HCT 39.1* 39.8*    192     Recent Labs     12/18/21  0027 12/19/21 0621    141   K 4.6 4.6   CL 98 103   CO2 24 24   BUN 51* 45*   CREATININE 2.0* 1.8*   CALCIUM 9.4 9.1     Recent Labs     12/18/21  0027 12/19/21 0621   AST 15 14   ALT 14 10*   BILIDIR <0.2  --    BILITOT 0.6 0.9   ALKPHOS 127* 122     Recent Labs     12/18/21 0027   INR 1.07     Microbiology:    None    Urinalysis:      Lab Results   Component Value Date    NITRU NEGATIVE 12/18/2021    WBCUA 0-2 12/18/2021    BACTERIA NONE SEEN 12/18/2021    RBCUA NONE SEEN 12/18/2021    BLOODU NEGATIVE 12/18/2021    GLUCOSEU NEGATIVE 12/18/2021       Radiology:  XR CHEST PORTABLE    Result Date: 12/18/2021  PROCEDURE: XR CHEST PORTABLE CLINICAL INFORMATION: Trauma . TECHNIQUE: Portable semiupright COMPARISON: No prior study. FINDINGS: Heart and mediastinal and hilar contours are within normal limits. No infiltrates or effusions. Vessels are not congested left-sided AICD.  EKG leads overlie the chest.     No acute disease **This report has been created using voice recognition software. It may contain minor errors which are inherent in voice recognition technology. ** Final report electronically signed by Dr. Junior Heaton on 12/18/2021 12:42 AM    CT INTERPRETATION OF OUTSIDE IMAGES    Result Date: 12/18/2021   ADDENDUM #1  Receipt of this report by the clinical staff was confirmed with Jagruti Rao RN on Dec 18, 2021 04:29:00 EST. This document has been electronically signed by: Corie Lefort on 12/18/2021 04:30 AM  ORIGINAL REPORT  CT thoracic spine without IV contrast. Comparison: None Findings: Normal alignment. Osteopenia. Acute oblique nondisplaced fracture through the mid and inferior aspects of the T8 vertebral body. No significant height loss or retropulsion. Additional acute, oblique fracture through the T8 spinous process. Disruption of anterior longitudinal ligament calcification at the T8 level and therefore ligamentous disruption cannot be excluded. Multilevel degenerative disc space narrowing and hypertrophic spurring. Prominent ligamentous calcifications. No high grade canal stenosis noted. No paraspinal soft tissue hematoma. Visualized lung fields without acute pathology. Moderate cardiomegaly. Dense atheromatous coronary vascular calcifications. Acute , oblique nondisplaced fracture through the mid and inferior aspects of the T8 vertebral body. No significant height loss or retropulsion. Additional acute, oblique fracture through the T8 spinous process. Disruption of anterior longitudinal ligament calcification at the T8 level and therefore ligamentous disruption cannot be excluded. This document has been electronically signed by: Carrie Sanchez MD on 12/18/2021 04:17 AM All CTs at this facility use dose modulation techniques and iterative reconstructions, and/or weight-based dosing when appropriate to reduce radiation to a low as reasonably achievable.     CT INTERPRETATION OF OUTSIDE IMAGES    Result Date: 12/18/2021  PROCEDURE: CT INTERPRETATION OF OUTSIDE IMAGES CLINICAL INFORMATION: trauma transfer . TECHNIQUE: 2-D multiplanar reconstructed images of the lumbar spine All CT scans at this facility use dose modulation, iterative reconstruction, and/or weight-based dosing when appropriate to reduce radiation dose to as low as reasonably achievable. COMPARISON: No prior study. FINDINGS: No acute fracture or acute alignment malalignment. Severe degenerative changes of the disks and marginal osteophytes. Bones are osteopenic. Incidental note is made of a distention of the urinary bladder partially imaged. No acute process **This report has been created using voice recognition software. It may contain minor errors which are inherent in voice recognition technology. ** Final report electronically signed by Dr. Junior Heaton on 12/18/2021 1:00 AM    CT INTERPRETATION OF OUTSIDE IMAGES    Result Date: 12/18/2021  PROCEDURE: CT INTERPRETATION OF OUTSIDE IMAGES CLINICAL INFORMATION: trauma transfer . TECHNIQUE: 2-D multiplanar noncontrast images of the cervical spine done at [facility]). All CT scans at this facility use dose modulation, iterative reconstruction, and/or weight-based dosing when appropriate to reduce radiation dose to as low as reasonably achievable. COMPARISON: No prior study. FINDINGS: No acute fracture or acute bony malalignment. Severe degenerative changes throughout. Osteopenia. No precervical soft tissue swelling. No acute process **This report has been created using voice recognition software. It may contain minor errors which are inherent in voice recognition technology. ** Final report electronically signed by Dr. Junior Heaton on 12/18/2021 12:59 AM    CT INTERPRETATION OF OUTSIDE IMAGES    Result Date: 12/18/2021  PROCEDURE: CT INTERPRETATION OF OUTSIDE IMAGES CLINICAL INFORMATION: trauma transfer  . TECHNIQUE: 2-D multiplanar noncontrast images of the brain done at [facility]).  All CT scans at this facility use dose modulation, iterative reconstruction, and/or weight-based dosing when appropriate to reduce radiation dose to as low as reasonably achievable. COMPARISON: No prior study. FINDINGS: Marked generalized cerebral volume loss. Ventricular megaly consistent with volume loss. No evidence of acute hemorrhage. No acute infarct seen. Calvarium is intact. Small air-fluid level in the right maxillary sinus postsurgical changes of the sinus. Radiopaque foreign body posterior to the right maxillary sinus postsurgical. Other visualized paranasal sinuses and mastoid is clear. No acute process **This report has been created using voice recognition software. It may contain minor errors which are inherent in voice recognition technology. ** Final report electronically signed by Dr. Noemí Olivas on 12/18/2021 12:56 AM      DVT prophylaxis: [] Lovenox                                 [x] SCDs                                 [] SQ Heparin                                 [] Encourage ambulation           [] Already on Anticoagulation     Code Status: Full Code    PT/OT Eval Status: Per primary    Tele:   [] yes             [x] no    Active Hospital Problems    Diagnosis Date Noted    Fall at home, initial encounter [W19. Oz, Y50.746] 12/18/2021    Closed fracture of eighth thoracic vertebra [facility]) [S22.069A] 12/18/2021       Electronically signed by MESSI Gee CNP on 12/20/2021 at 7:02 AM

## 2021-12-20 NOTE — PROGRESS NOTES
Confirmed home medications with wife. Med rec completed at this time. Wife states last dose of Plavix was Friday morning - 12/17/21.

## 2021-12-20 NOTE — PROGRESS NOTES
Patient missing gold bifocal prescription glasses. Family states he was wearing them in the ambulance on the way here to be admitted. Called ED  and they stated they do not have any glasses that fit the description. Called [facility] lost [facility] found and they stated they would look.

## 2021-12-20 NOTE — CARE COORDINATION
12/20/21, 7:23 AM EST  DISCHARGE PLANNING EVALUATION:    Shiv Abebe       Admitted: 12/18/2021/ [facility]   Hospital day: 2   Location: -01/001-A Reason for admit: Fall at home, initial encounter [W19. Jace Walker, A34.160]  Fall at home, initial encounter [F05. Jace Walker, H47.661]   PMH:  has a past medical history of Arthritis, CHF (congestive heart failure) ([address] Constipation, Hypertension, Thyroid disease, and Type II or unspecified type diabetes mellitus without mention of complication, not stated as uncontrolled. Procedure: Planned Kyphoplasty of T8. Barriers to Discharge:  Patient was a direct admit from [facility]. Patient fell at home. He suffered a T-8 compression fracture and has a pace-maker. Consults to Hospitalist, Cardiology and Neurosurgery, IV fluids, DM management, prn pain medications, daily weights, incentive spirometry, SCD's, log roll patient, strict bedrest, PT/OT/ST, consult to SS. PCP: [place]  Readmission Risk Score: 14.9 ( )%    Patient Goals/Plan/Treatment Preferences: Met with Hayden Smith, his wife and daughter present at bedside. Hayden Smith verifies his insurance. His PCP is Dr. Lisa Borges, Care Team updated. Hayden Smith is able to afford his medications. He has a C-PAP, walkers and a wheelchair at home. Hayden Smith does not have any services in place prior to admission. He is aware he will need some type of therapy at discharge. His wife does not drive. They are interested in Acute Rehab. Explained bed availability and possible need for SNF. Discharge plan pending surgical outcome and therapy recommendations. Transportation/Food Security/Housekeeping Addressed:  No issues identified.

## 2021-12-20 NOTE — PROGRESS NOTES
I have independently performed an evaluation on Carolyn Alvarez . I have reviewed the above documentation completed by the [facility]. Please see my additional contributions to the HPI, physical exam, assessment/medical decision making. No new events, patient awaiting OR availablity. Pain moderatley controlled. Electronically signed by Vaishali Alexander MD on 12/21/2021 at 2:04 PM    [address] Surgery - Dr. Crystal Israel  Daily Progress Note  Pt Name: Miladis Gonsalez Record Number: [id number]  Date of Birth 10/25/1932   Today's Date: 12/20/2021    HD: # 2    CC: Back pain with movement    ASSESSMENT  1. Active Hospital Problems    Diagnosis Date Noted    Fall at home, initial encounter [W19. Lawrence Argueta, E56.542] 12/18/2021    Closed fracture of eighth thoracic vertebra ([address] [S22.069A] 12/18/2021         PLAN  Admit to Trauma Services     Trauma by fall              - PT and OT when appropriate              - Fall precautions     T8 vertebral body fracture with anterior longitudinal ligament disruption   T6, T7 & T8 spinous process fractures              - Neurosurgery assisting with management              - Bedrest              - MRIs of the spine are unable to be completed as pacer/defib not compatible               - Neuro checks              - Maintain spinal neutrality              - Pain control   - Plan for kyphoplasty pending OR availability.     Hx of HTN, A-fib, DM2, CHF              - Hospitalist & cardiology assisting with management and surgical clearance     Pain control                   - Norco. Lidocaine, Morphine PRN     General diet, NPO after midnight  IVF hydration  Repeat labs in AM  Prophylaxis: SCDs, IS, C&DB, Pepcid, stool softeners  PT, OT, SLP eval and treat  Discharge disposition pending clinical course     SUBJECTIVE  He is a 80 y.o. male who continues to present at [address] [place] [address] on 5K following a fall. Patient reports to knee pain and back, otherwise no complaints. Patient denies chest pain, shortness of breath, cough, headache, dizziness, lightheadedness, numbness, paraesthesias, weakness, chills, fevers, abdominal pain, nausea, vomiting,neck pain, or back pain. Plan for OR with neurosurgery when operating room availability. Vital signs stable on exam. Care in coordination with trauma surgeon Dr. Jimmy Al. Wt Readings from Last 3 Encounters:   12/20/21 200 lb (90.7 kg)   08/11/16 209 lb (94.8 kg)   02/16/16 209 lb 12.8 oz (95.2 kg)     Temp Readings from Last 3 Encounters:   12/20/21 98.1 °F (36.7 °C) (Oral)   02/16/16 96.4 °F (35.8 °C)     BP Readings from Last 3 Encounters:   12/20/21 (!) 140/70   02/16/16 134/84   02/11/16 124/72     Pulse Readings from Last 3 Encounters:   12/20/21 90   02/16/16 72   09/09/13 92       24 HR INTAKE/OUTPUT :     Intake/Output Summary (Last 24 hours) at 12/20/2021 1609  Last data filed at 12/20/2021 1441  Gross per 24 hour   Intake 480 ml   Output 2000 ml   Net -1520 ml     ADULT DIET; Regular; 4 carb choices (60 gm/meal)  Diet NPO    OBJECTIVE  CURRENT VITALS BP (!) 140/70   Pulse 90   Temp 98.1 °F (36.7 °C) (Oral)   Resp 18   Ht 6' 1\" (1.854 m)   Wt 200 lb (90.7 kg)   SpO2 97%   BMI 26.39 kg/m²   GENERAL: Presents semi-Fowlers bed unassisted, Awake and alert; In no acute distress and well nourished. SKIN: Appropriate for ethnicity, warm and dry. ENT: No apparent trauma, discharge, or hematoma bilaterally. PERRL at 3mm. Nares patient, membranes moist  CARDIO: No visible chest wall deformity. Strong/regula S1/S2. 2+ radial and DP pulses bilaterally. Capillary refill <2 sec. No extremity edema noted. PULMONARY:  Trachea midline, no increased respiratory effort, or paradoxical chest movement. Lung sounds are clear to ascultation in all fields without adventitious sounds. ABDOMEN: Abdomen is soft, non distended. Bowel sounds present in all four quadrants. NTTP in all quadrants   NEURO: GCS 15.   PMS intact, moves limbs freely. No focal neurological deficits  MSK: Extremities intact and present. No new bruising, swelling, deformity, discoloration or bleeding. NTTP over major muscle groups.  strength 5/5 and equal bilaterally. LABS  CBC :   Recent Labs     12/18/21  0358 12/19/21 0621   WBC 6.3 5.9   HGB 12.4* 12.4*   HCT 39.1* 39.8*   MCV 98.7* 99.3*    192     BMP:   Recent Labs     12/18/21  0027 12/19/21 0621    141   K 4.6 4.6   CL 98 103   CO2 24 24   BUN 51* 45*   CREATININE 2.0* 1.8*     COAGS:   Recent Labs     12/18/21  0027 12/19/21 0621   APTT 31.0  --    PROT 6.9 6.4   INR 1.07  --      Pancreas/HFP:  No results for input(s): LIPASE, AMYLASE in the last 72 hours.   Recent Labs     12/18/21 0027 12/19/21 0621   AST 15 14   ALT 14 10*   BILIDIR <0.2  --    BILITOT 0.6 0.9   ALKPHOS 127* 122     RADIOLOGY:  No new results    Electronically signed by ANGELICA Lucas on 12/20/2021 at 4:09 PM

## 2021-12-20 NOTE — PROGRESS NOTES
[facility]  OCCUPATIONAL THERAPY MISSED TREATMENT NOTE  [facility] ONC MED 5K  5K-01/001-A      Date: 2021  Patient Name: Gaby Jackson        CSN: [id number]   : 10/25/1932  (80 y.o.)  Gender: male                REASON FOR MISSED TREATMENT: OT attempted. Per chart review, pt scheduled for kyphoplasty later this date.  Will hold at this time and check back as able

## 2021-12-21 ENCOUNTER — ANESTHESIA (OUTPATIENT)
Dept: OPERATING ROOM | Age: 86
DRG: 477 | End: 2021-12-21
Payer: MEDICARE

## 2021-12-21 ENCOUNTER — APPOINTMENT (OUTPATIENT)
Dept: GENERAL RADIOLOGY | Age: 86
DRG: 477 | End: 2021-12-21
Payer: MEDICARE

## 2021-12-21 ENCOUNTER — ANESTHESIA EVENT (OUTPATIENT)
Dept: OPERATING ROOM | Age: 86
DRG: 477 | End: 2021-12-21
Payer: MEDICARE

## 2021-12-21 VITALS — DIASTOLIC BLOOD PRESSURE: 54 MMHG | SYSTOLIC BLOOD PRESSURE: 104 MMHG | OXYGEN SATURATION: 100 %

## 2021-12-21 LAB
ANION GAP SERPL CALCULATED.3IONS-SCNC: 13 MEQ/L (ref 8–16)
BUN BLDV-MCNC: 33 MG/DL (ref 7–22)
CALCIUM SERPL-MCNC: 8.3 MG/DL (ref 8.5–10.5)
CHLORIDE BLD-SCNC: 102 MEQ/L (ref 98–111)
CO2: 22 MEQ/L (ref 23–33)
CREAT SERPL-MCNC: 1.4 MG/DL (ref 0.4–1.2)
ERYTHROCYTE [DISTWIDTH] IN BLOOD BY AUTOMATED COUNT: 12.6 % (ref 11.5–14.5)
ERYTHROCYTE [DISTWIDTH] IN BLOOD BY AUTOMATED COUNT: 45.7 FL (ref 35–45)
GFR SERPL CREATININE-BSD FRML MDRD: 48 ML/MIN/1.73M2
GLUCOSE BLD-MCNC: 110 MG/DL (ref 70–108)
GLUCOSE BLD-MCNC: 124 MG/DL (ref 70–108)
GLUCOSE BLD-MCNC: 134 MG/DL (ref 70–108)
GLUCOSE BLD-MCNC: 164 MG/DL (ref 70–108)
HCT VFR BLD CALC: 36.7 % (ref 42–52)
HEMOGLOBIN: 11.6 GM/DL (ref 14–18)
MCH RBC QN AUTO: 31.3 PG (ref 26–33)
MCHC RBC AUTO-ENTMCNC: 31.6 GM/DL (ref 32.2–35.5)
MCV RBC AUTO: 98.9 FL (ref 80–94)
PLATELET # BLD: 176 THOU/MM3 (ref 130–400)
PMV BLD AUTO: 8.8 FL (ref 9.4–12.4)
POTASSIUM SERPL-SCNC: 4.6 MEQ/L (ref 3.5–5.2)
RBC # BLD: 3.71 MILL/MM3 (ref 4.7–6.1)
SODIUM BLD-SCNC: 137 MEQ/L (ref 135–145)
WBC # BLD: 4.5 THOU/MM3 (ref 4.8–10.8)

## 2021-12-21 PROCEDURE — 22513 PERQ VERTEBRAL AUGMENTATION: CPT | Performed by: NEUROLOGICAL SURGERY

## 2021-12-21 PROCEDURE — 72072 X-RAY EXAM THORAC SPINE 3VWS: CPT

## 2021-12-21 PROCEDURE — C1713 ANCHOR/SCREW BN/BN,TIS/BN: HCPCS | Performed by: NEUROLOGICAL SURGERY

## 2021-12-21 PROCEDURE — 2500000003 HC RX 250 WO HCPCS: Performed by: REGISTERED NURSE

## 2021-12-21 PROCEDURE — 99232 SBSQ HOSP IP/OBS MODERATE 35: CPT | Performed by: NURSE PRACTITIONER

## 2021-12-21 PROCEDURE — 6360000002 HC RX W HCPCS: Performed by: REGISTERED NURSE

## 2021-12-21 PROCEDURE — 2720000010 HC SURG SUPPLY STERILE: Performed by: NEUROLOGICAL SURGERY

## 2021-12-21 PROCEDURE — 7100000001 HC PACU RECOVERY - ADDTL 15 MIN: Performed by: NEUROLOGICAL SURGERY

## 2021-12-21 PROCEDURE — 0PB40ZX EXCISION OF THORACIC VERTEBRA, OPEN APPROACH, DIAGNOSTIC: ICD-10-PCS | Performed by: NEUROLOGICAL SURGERY

## 2021-12-21 PROCEDURE — 3600000004 HC SURGERY LEVEL 4 BASE: Performed by: NEUROLOGICAL SURGERY

## 2021-12-21 PROCEDURE — 6360000002 HC RX W HCPCS: Performed by: ANESTHESIOLOGY

## 2021-12-21 PROCEDURE — 0PS43ZZ REPOSITION THORACIC VERTEBRA, PERCUTANEOUS APPROACH: ICD-10-PCS | Performed by: NEUROLOGICAL SURGERY

## 2021-12-21 PROCEDURE — 82948 REAGENT STRIP/BLOOD GLUCOSE: CPT

## 2021-12-21 PROCEDURE — 2580000003 HC RX 258: Performed by: NEUROLOGICAL SURGERY

## 2021-12-21 PROCEDURE — 2709999900 HC NON-CHARGEABLE SUPPLY: Performed by: NEUROLOGICAL SURGERY

## 2021-12-21 PROCEDURE — 1200000000 HC SEMI PRIVATE

## 2021-12-21 PROCEDURE — 6360000002 HC RX W HCPCS: Performed by: PHYSICIAN ASSISTANT

## 2021-12-21 PROCEDURE — 3600000014 HC SURGERY LEVEL 4 ADDTL 15MIN: Performed by: NEUROLOGICAL SURGERY

## 2021-12-21 PROCEDURE — 7100000000 HC PACU RECOVERY - FIRST 15 MIN: Performed by: NEUROLOGICAL SURGERY

## 2021-12-21 PROCEDURE — 3209999900 FLUORO FOR SURGICAL PROCEDURES

## 2021-12-21 PROCEDURE — 3700000001 HC ADD 15 MINUTES (ANESTHESIA): Performed by: NEUROLOGICAL SURGERY

## 2021-12-21 PROCEDURE — 85027 COMPLETE CBC AUTOMATED: CPT

## 2021-12-21 PROCEDURE — 2500000003 HC RX 250 WO HCPCS

## 2021-12-21 PROCEDURE — 88305 TISSUE EXAM BY PATHOLOGIST: CPT

## 2021-12-21 PROCEDURE — 80048 BASIC METABOLIC PNL TOTAL CA: CPT

## 2021-12-21 PROCEDURE — 3700000000 HC ANESTHESIA ATTENDED CARE: Performed by: NEUROLOGICAL SURGERY

## 2021-12-21 PROCEDURE — C1894 INTRO/SHEATH, NON-LASER: HCPCS | Performed by: NEUROLOGICAL SURGERY

## 2021-12-21 PROCEDURE — 6360000002 HC RX W HCPCS

## 2021-12-21 PROCEDURE — 0PU43JZ SUPPLEMENT THORACIC VERTEBRA WITH SYNTHETIC SUBSTITUTE, PERCUTANEOUS APPROACH: ICD-10-PCS | Performed by: NEUROLOGICAL SURGERY

## 2021-12-21 PROCEDURE — 6370000000 HC RX 637 (ALT 250 FOR IP): Performed by: NURSE PRACTITIONER

## 2021-12-21 PROCEDURE — 36415 COLL VENOUS BLD VENIPUNCTURE: CPT

## 2021-12-21 DEVICE — BONE CEMENT CX01A XPEDE US
Type: IMPLANTABLE DEVICE | Status: FUNCTIONAL
Brand: KYPHON® XPEDE™ BONE CEMENT

## 2021-12-21 RX ORDER — ONDANSETRON 2 MG/ML
4 INJECTION INTRAMUSCULAR; INTRAVENOUS EVERY 6 HOURS PRN
Status: DISCONTINUED | OUTPATIENT
Start: 2021-12-21 | End: 2021-12-31 | Stop reason: HOSPADM

## 2021-12-21 RX ORDER — KETOROLAC TROMETHAMINE 30 MG/ML
INJECTION, SOLUTION INTRAMUSCULAR; INTRAVENOUS
Status: COMPLETED
Start: 2021-12-21 | End: 2021-12-21

## 2021-12-21 RX ORDER — METOPROLOL TARTRATE 5 MG/5ML
INJECTION INTRAVENOUS
Status: COMPLETED
Start: 2021-12-21 | End: 2021-12-21

## 2021-12-21 RX ORDER — SODIUM CHLORIDE 0.9 % (FLUSH) 0.9 %
5-40 SYRINGE (ML) INJECTION PRN
Status: DISCONTINUED | OUTPATIENT
Start: 2021-12-21 | End: 2021-12-31 | Stop reason: HOSPADM

## 2021-12-21 RX ORDER — DEXAMETHASONE SODIUM PHOSPHATE 10 MG/ML
INJECTION, EMULSION INTRAMUSCULAR; INTRAVENOUS PRN
Status: DISCONTINUED | OUTPATIENT
Start: 2021-12-21 | End: 2021-12-21 | Stop reason: SDUPTHER

## 2021-12-21 RX ORDER — LIDOCAINE HYDROCHLORIDE 20 MG/ML
INJECTION, SOLUTION INTRAVENOUS PRN
Status: DISCONTINUED | OUTPATIENT
Start: 2021-12-21 | End: 2021-12-21 | Stop reason: SDUPTHER

## 2021-12-21 RX ORDER — SODIUM CHLORIDE 0.9 % (FLUSH) 0.9 %
5-40 SYRINGE (ML) INJECTION EVERY 12 HOURS SCHEDULED
Status: DISCONTINUED | OUTPATIENT
Start: 2021-12-21 | End: 2021-12-31 | Stop reason: HOSPADM

## 2021-12-21 RX ORDER — MORPHINE SULFATE 2 MG/ML
2 INJECTION, SOLUTION INTRAMUSCULAR; INTRAVENOUS
Status: DISCONTINUED | OUTPATIENT
Start: 2021-12-21 | End: 2021-12-31 | Stop reason: HOSPADM

## 2021-12-21 RX ORDER — FENTANYL CITRATE 50 UG/ML
INJECTION, SOLUTION INTRAMUSCULAR; INTRAVENOUS
Status: COMPLETED
Start: 2021-12-21 | End: 2021-12-21

## 2021-12-21 RX ORDER — MORPHINE SULFATE 4 MG/ML
4 INJECTION, SOLUTION INTRAMUSCULAR; INTRAVENOUS
Status: DISCONTINUED | OUTPATIENT
Start: 2021-12-21 | End: 2021-12-21

## 2021-12-21 RX ORDER — SODIUM CHLORIDE 9 MG/ML
25 INJECTION, SOLUTION INTRAVENOUS PRN
Status: DISCONTINUED | OUTPATIENT
Start: 2021-12-21 | End: 2021-12-31 | Stop reason: HOSPADM

## 2021-12-21 RX ORDER — FENTANYL CITRATE 50 UG/ML
50 INJECTION, SOLUTION INTRAMUSCULAR; INTRAVENOUS EVERY 5 MIN PRN
Status: DISCONTINUED | OUTPATIENT
Start: 2021-12-21 | End: 2021-12-21 | Stop reason: HOSPADM

## 2021-12-21 RX ORDER — ROCURONIUM BROMIDE 10 MG/ML
INJECTION, SOLUTION INTRAVENOUS PRN
Status: DISCONTINUED | OUTPATIENT
Start: 2021-12-21 | End: 2021-12-21 | Stop reason: SDUPTHER

## 2021-12-21 RX ORDER — CEFAZOLIN SODIUM 1 G/3ML
INJECTION, POWDER, FOR SOLUTION INTRAMUSCULAR; INTRAVENOUS PRN
Status: DISCONTINUED | OUTPATIENT
Start: 2021-12-21 | End: 2021-12-21 | Stop reason: SDUPTHER

## 2021-12-21 RX ORDER — KETOROLAC TROMETHAMINE 30 MG/ML
15 INJECTION, SOLUTION INTRAMUSCULAR; INTRAVENOUS ONCE
Status: COMPLETED | OUTPATIENT
Start: 2021-12-21 | End: 2021-12-21

## 2021-12-21 RX ORDER — ONDANSETRON 2 MG/ML
INJECTION INTRAMUSCULAR; INTRAVENOUS PRN
Status: DISCONTINUED | OUTPATIENT
Start: 2021-12-21 | End: 2021-12-21 | Stop reason: SDUPTHER

## 2021-12-21 RX ORDER — ONDANSETRON 4 MG/1
4 TABLET, ORALLY DISINTEGRATING ORAL EVERY 8 HOURS PRN
Status: DISCONTINUED | OUTPATIENT
Start: 2021-12-21 | End: 2021-12-31 | Stop reason: HOSPADM

## 2021-12-21 RX ORDER — PROPOFOL 10 MG/ML
INJECTION, EMULSION INTRAVENOUS PRN
Status: DISCONTINUED | OUTPATIENT
Start: 2021-12-21 | End: 2021-12-21 | Stop reason: SDUPTHER

## 2021-12-21 RX ORDER — METOPROLOL TARTRATE 5 MG/5ML
5 INJECTION INTRAVENOUS ONCE
Status: COMPLETED | OUTPATIENT
Start: 2021-12-21 | End: 2021-12-21

## 2021-12-21 RX ORDER — FENTANYL CITRATE 50 UG/ML
50 INJECTION, SOLUTION INTRAMUSCULAR; INTRAVENOUS
Status: DISCONTINUED | OUTPATIENT
Start: 2021-12-21 | End: 2021-12-21

## 2021-12-21 RX ADMIN — METOPROLOL TARTRATE 2 MG: 5 INJECTION INTRAVENOUS at 18:02

## 2021-12-21 RX ADMIN — KETOROLAC TROMETHAMINE 15 MG: 30 INJECTION, SOLUTION INTRAMUSCULAR; INTRAVENOUS at 17:45

## 2021-12-21 RX ADMIN — SUGAMMADEX 200 MG: 100 INJECTION, SOLUTION INTRAVENOUS at 17:19

## 2021-12-21 RX ADMIN — DEXAMETHASONE SODIUM PHOSPHATE 4 MG: 10 INJECTION, EMULSION INTRAMUSCULAR; INTRAVENOUS at 16:09

## 2021-12-21 RX ADMIN — LIDOCAINE HYDROCHLORIDE 60 MG: 20 INJECTION, SOLUTION INTRAVENOUS at 16:05

## 2021-12-21 RX ADMIN — MIDODRINE HYDROCHLORIDE 5 MG: 5 TABLET ORAL at 08:20

## 2021-12-21 RX ADMIN — ONDANSETRON 4 MG: 2 INJECTION INTRAMUSCULAR; INTRAVENOUS at 16:09

## 2021-12-21 RX ADMIN — ROCURONIUM BROMIDE 50 MG: 10 INJECTION INTRAVENOUS at 16:05

## 2021-12-21 RX ADMIN — CEFAZOLIN 2000 MG: 1 INJECTION, POWDER, FOR SOLUTION INTRAMUSCULAR; INTRAVENOUS at 16:36

## 2021-12-21 RX ADMIN — FENTANYL CITRATE 50 MCG: 50 INJECTION, SOLUTION INTRAMUSCULAR; INTRAVENOUS at 17:42

## 2021-12-21 RX ADMIN — LEVOTHYROXINE SODIUM 150 MCG: 0.15 TABLET ORAL at 05:49

## 2021-12-21 RX ADMIN — PROPOFOL 100 MG: 10 INJECTION, EMULSION INTRAVENOUS at 16:05

## 2021-12-21 RX ADMIN — FENTANYL CITRATE 50 MCG: 50 INJECTION, SOLUTION INTRAMUSCULAR; INTRAVENOUS at 17:52

## 2021-12-21 RX ADMIN — CARVEDILOL 6.25 MG: 6.25 TABLET, FILM COATED ORAL at 08:20

## 2021-12-21 RX ADMIN — MORPHINE SULFATE 4 MG: 4 INJECTION, SOLUTION INTRAMUSCULAR; INTRAVENOUS at 10:10

## 2021-12-21 RX ADMIN — SODIUM CHLORIDE: 9 INJECTION, SOLUTION INTRAVENOUS at 10:13

## 2021-12-21 RX ADMIN — CYCLOBENZAPRINE 10 MG: 10 TABLET, FILM COATED ORAL at 02:36

## 2021-12-21 RX ADMIN — HYDROCODONE BITARTRATE AND ACETAMINOPHEN 2 TABLET: 5; 325 TABLET ORAL at 02:36

## 2021-12-21 ASSESSMENT — PULMONARY FUNCTION TESTS
PIF_VALUE: 15
PIF_VALUE: 12
PIF_VALUE: 16
PIF_VALUE: 16
PIF_VALUE: 12
PIF_VALUE: 14
PIF_VALUE: 16
PIF_VALUE: 16
PIF_VALUE: 13
PIF_VALUE: 13
PIF_VALUE: 6
PIF_VALUE: 1
PIF_VALUE: 17
PIF_VALUE: 16
PIF_VALUE: 12
PIF_VALUE: 2
PIF_VALUE: 16
PIF_VALUE: 19
PIF_VALUE: 16
PIF_VALUE: 16
PIF_VALUE: 2
PIF_VALUE: 5
PIF_VALUE: 2
PIF_VALUE: 16
PIF_VALUE: 17
PIF_VALUE: 12
PIF_VALUE: 13
PIF_VALUE: 12
PIF_VALUE: 16
PIF_VALUE: 16
PIF_VALUE: 14
PIF_VALUE: 12
PIF_VALUE: 17
PIF_VALUE: 5
PIF_VALUE: 12
PIF_VALUE: 15
PIF_VALUE: 16
PIF_VALUE: 16
PIF_VALUE: 12
PIF_VALUE: 17
PIF_VALUE: 5
PIF_VALUE: 16
PIF_VALUE: 19
PIF_VALUE: 17
PIF_VALUE: 16
PIF_VALUE: 12
PIF_VALUE: 15
PIF_VALUE: 16
PIF_VALUE: 16
PIF_VALUE: 17
PIF_VALUE: 16
PIF_VALUE: 0
PIF_VALUE: 12
PIF_VALUE: 19
PIF_VALUE: 16
PIF_VALUE: 12
PIF_VALUE: 16
PIF_VALUE: 1
PIF_VALUE: 16
PIF_VALUE: 12
PIF_VALUE: 16
PIF_VALUE: 12
PIF_VALUE: 0
PIF_VALUE: 16
PIF_VALUE: 12
PIF_VALUE: 12
PIF_VALUE: 16
PIF_VALUE: 12
PIF_VALUE: 14
PIF_VALUE: 16
PIF_VALUE: 16
PIF_VALUE: 12
PIF_VALUE: 16
PIF_VALUE: 16
PIF_VALUE: 12
PIF_VALUE: 16
PIF_VALUE: 12
PIF_VALUE: 16
PIF_VALUE: 8

## 2021-12-21 ASSESSMENT — PAIN DESCRIPTION - PAIN TYPE
TYPE: ACUTE PAIN
TYPE: ACUTE PAIN

## 2021-12-21 ASSESSMENT — PAIN SCALES - GENERAL
PAINLEVEL_OUTOF10: 8
PAINLEVEL_OUTOF10: 5
PAINLEVEL_OUTOF10: 7
PAINLEVEL_OUTOF10: 0
PAINLEVEL_OUTOF10: 8
PAINLEVEL_OUTOF10: 7
PAINLEVEL_OUTOF10: 7
PAINLEVEL_OUTOF10: 8
PAINLEVEL_OUTOF10: 5

## 2021-12-21 ASSESSMENT — PAIN DESCRIPTION - LOCATION
LOCATION: BACK
LOCATION: BACK

## 2021-12-21 NOTE — PLAN OF CARE
[facility]  OCCUPATIONAL THERAPY MISSED TREATMENT NOTE  [facility] ONC MED 5K  5K-01/001-A      Date: 2021  Patient Name: Astrid Post        CSN: [id number]   : 10/25/1932  (80 y.o.)  Gender: male                REASON FOR MISSED TREATMENT: Hold Treatment per Nursing  Per nursing, pt cont to be on bed rest, scheduled for kyphoplasty today.   OT will see after kyphosplasty

## 2021-12-21 NOTE — PROGRESS NOTES
Patient was seen and examined in the pre op area in conjunction with neurosurgery ANGELICA Solis PA-C). There are no changes in patient symptoms and neurological exam since yesterday. Today I discussed with patient and his family again today planned/recommended surgical intervention as well as the associated risk and benefit and alternative. All questions and concerns were addressed and answered. Patient elected again to go with today planned surgical intervention.

## 2021-12-21 NOTE — ANESTHESIA POSTPROCEDURE EVALUATION
Department of Anesthesiology  Postprocedure Note    Patient: Yvette Evans  MRN: [id number]  YOB: 1932  Date of evaluation: 12/21/2021  Time:  6:54 PM     Procedure Summary     Date: 12/21/21 Room / Location: [address]    Anesthesia Start: 1558 Anesthesia Stop: 7349    Procedure: KYPHOPLASTY AT T8 (N/A ) Diagnosis: (THORACIC (T8) FRACTURE)    Surgeons: Sahra Cotton MD Responsible Provider: Ronnie Lawson MD    Anesthesia Type: general ASA Status: 3          Anesthesia Type: general    Clint Phase I: Clint Score: 8    Clint Phase II:      Last vitals: Reviewed and per EMR flowsheets.        Anesthesia Post Evaluation    Patient location during evaluation: PACU  Patient participation: complete - patient participated  Level of consciousness: awake and alert  Airway patency: patent  Nausea & Vomiting: no nausea  Complications: no  Cardiovascular status: blood pressure returned to baseline and hemodynamically stable  Respiratory status: acceptable and spontaneous ventilation  Hydration status: euvolemic

## 2021-12-21 NOTE — BRIEF OP NOTE
Brief Postoperative Note      Patient: Lorraine Currie  YOB: 1932  MRN: [id number]    Date of Procedure: 12/21/2021    Pre-Op Diagnosis: THORACIC (T8) FRACTURE    Post-Op Diagnosis: Same       Procedure(s):  KYPHOPLASTY AT T8    Surgeon(s):  Mark Castillo MD    Assistant:  Physician Assistant: Jocelynn Kapoor PA-C    Anesthesia: General    Estimated Blood Loss (mL): Minimal    Complications: None    Specimens:   ID Type Source Tests Collected by Time Destination   A : Thoracic 8 Fracture  Tissue Back SURGICAL PATHOLOGY Mark Castillo MD 12/21/2021 1637        Implants:  Implant Name Type Inv.  Item Serial No.  Lot No. LRB No. Used Action   CEMENT BNE Peg Bowers  CEMENT BNE [address] [facility] [id number] N/A 1 Implanted         Drains: * No LDAs found *    Findings: Compression fracture of thoracic 8    Electronically signed by Jocelynn Kapoor PA-C on 12/21/2021 at 5:20 PM

## 2021-12-21 NOTE — FLOWSHEET NOTE
12/21/21 1002   Encounter Summary   Services provided to: Patient and family together   Referral/Consult From: Rounding   Continue Visiting Yes  (12/21)   Complexity of Encounter Moderate   Length of Encounter 15 minutes   Routine   Type Initial   Assessment Calm; Approachable   Intervention Active listening;Nurtured hope   Outcome Comfort;Expressed gratitude   Spiritual/Alevism   Type Spiritual support   Assessment: In my encounter with the 80 yr old patient the pts family was supportively present. While rounding the unit 5K,  I provided spiritual care to patient and their family through conversation, I also came to assess their spiritual needs present. The pt was admitted due to a fall at home. Interventions:  I provided, prayer, emotional support and words of comfort.  provided a listening presence and encouraged pt to share their beliefs and how they support him during their hospitalization. The pt shared his experience while he was in the [employer]. Outcomes: The patient was encouraged and didnt share any further spiritual needs at this time. The pt remains optimistic and hopeful. The pt shared that they were appreciative for the support. Plan:  Chaplains will follow-up at a later time for assessment of any spiritual care needs present.

## 2021-12-21 NOTE — PROGRESS NOTES
[facility] [address]  PHYSICAL THERAPY MISSED TREATMENT NOTE  [facility] ONC MED 5K    Date: 2021  Patient Name: Janae Heredia        MRN: [id number]   : 10/25/1932  (80 y.o.)  Gender: male                REASON FOR MISSED TREATMENT:  Hold treatment per nursing request.  Per nursing, pt cont to be on bed rest, scheduled for kyphoplasty today, will see after kyphosplasty.      Katie Moon PT, DPT

## 2021-12-21 NOTE — PROGRESS NOTES
[facility] - Dr. Eh Scott  Daily Progress Note  Pt Name: Aidan Hayward  Medical Record Number: [id number]  Date of Birth 10/25/1932   Today's Date: 12/21/2021    HD: # 3    CC: neck pain and back pain    ASSESSMENT  1. Active Hospital Problems    Diagnosis Date Noted    Fall at home, initial encounter [W19. Johann Bautista, H63.709] 12/18/2021    Closed fracture of eighth thoracic vertebra ([address] [place] 12/18/2021         PLAN  Admit to Trauma Services     Trauma by fall              - PT and OT when appropriate               - Fall precautions     T8 vertebral body fracture with anterior longitudinal ligament disruption   T6, T7 & T8 spinous process fractures              - Neurosurgery assisting with management              - Bedrest              - MRIs of the spine are unable to be completed as pacer/defib not compatible               - Neuro checks              - Maintain spinal neutrality              - Pain control   - Plan for kyphoplasty today with Dr. Sharita Clifford     Hx of HTN, A-fib, DM2, CHF              - Hospitalist & cardiology assisting with management and surgical clearance     Pain control                   - Norco. Lidocaine, Morphine PRN     General diet, NPO after midnight  IVF hydration  Repeat labs in AM  Prophylaxis: SCDs, IS, C&DB, Pepcid, stool softeners  PT, OT, SLP eval and treat  Discharge disposition pending clinical course     SUBJECTIVE  He is a 80 y.o. male who continues to present at [address] [place] [address] on 5K following a fall. Patient reports to knee pain and back, otherwise no complaints. He denies headache, visual changes, paresthesias, weakness, chest pain, shortness of breath, abdominal pain, nausea, vomiting, swelling or pain in the legs. Patient will go to the OR today with neurosurgery for kyphoplasty. Chart reviewed and vital signs stable on exam. Care in coordination with trauma surgeon Dr. Lucian Jackson.     Wt Readings from Last 3 Encounters:   12/21/21 204 lb 11.2 oz (92.9 kg)   08/11/16 209 lb (94.8 kg)   02/16/16 209 lb 12.8 oz (95.2 kg)     Temp Readings from Last 3 Encounters:   12/21/21 97.6 °F (36.4 °C) (Oral)   02/16/16 96.4 °F (35.8 °C)     BP Readings from Last 3 Encounters:   12/21/21 135/75   02/16/16 134/84   02/11/16 124/72     Pulse Readings from Last 3 Encounters:   12/21/21 96   02/16/16 72   09/09/13 92       24 HR INTAKE/OUTPUT :     Intake/Output Summary (Last 24 hours) at 12/21/2021 0942  Last data filed at 12/21/2021 0220  Gross per 24 hour   Intake --   Output 750 ml   Net -750 ml     Diet NPO    OBJECTIVE  CURRENT VITALS /75   Pulse 96   Temp 97.6 °F (36.4 °C) (Oral)   Resp 18   Ht 6' 1\" (1.854 m)   Wt 204 lb 11.2 oz (92.9 kg)   SpO2 97%   BMI 27.01 kg/m²   GENERAL: Presents semi-Fowlers bed unassisted, Awake and alert; In no acute distress and well nourished. SKIN: Appropriate for ethnicity, warm and dry. ENT: No apparent trauma, discharge, or hematoma bilaterally. PERRL at 3mm. Nares patient, membranes moist  CARDIO: No visible chest wall deformity. Strong/regula S1/S2. 2+ radial and DP pulses bilaterally. Capillary refill <2 sec. No extremity edema noted. PULMONARY:  Trachea midline, no increased respiratory effort, or paradoxical chest movement. Lung sounds are clear to ascultation in all fields without adventitious sounds. ABDOMEN: Abdomen is soft, non distended. Bowel sounds present in all four quadrants. NTTP in all quadrants   NEURO: GCS 15. PMS intact, moves limbs freely. No focal neurological deficits  MSK: Extremities intact and present. No new bruising, swelling, deformity, discoloration or bleeding. NTTP over major muscle groups.  strength 5/5 and equal bilaterally.      LABS  CBC :   Recent Labs     12/19/21  0621 12/21/21  0524   WBC 5.9 4.5*   HGB 12.4* 11.6*   HCT 39.8* 36.7*   MCV 99.3* 98.9*    176     BMP:   Recent Labs     12/19/21  0621 12/21/21  0524    137   K 4.6 4.6    102 CO2 24 22*   BUN 45* 33*   CREATININE 1.8* 1.4*     COAGS:   Recent Labs     12/19/21  0621   PROT 6.4     Pancreas/HFP:  No results for input(s): LIPASE, AMYLASE in the last 72 hours.   Recent Labs     12/19/21 0621   AST 14   ALT 10*   BILITOT 0.9   ALKPHOS 122     RADIOLOGY:  No new results    Electronically signed by Amanda Salgado PA-C on 12/21/2021 at 9:42 AM

## 2021-12-21 NOTE — ANESTHESIA PRE PROCEDURE
morphine injection 4 mg  4 mg IntraVENous Q2H PRN Shama Fisher PA-C   4 mg at 12/21/21 1010    [Held by provider] glipiZIDE (GLUCOTROL) tablet 5 mg  5 mg Oral QAM  Silvia Malone APRN - CNP        [Held by provider] alogliptin (NESINA) tablet 12.5 mg  12.5 mg Oral Daily Silvia Malone APRN - CNP        carvedilol (COREG) tablet 6.25 mg  6.25 mg Oral BID  Qamar Harley, APRN - CNP   6.25 mg at 12/21/21 0820    atorvastatin (LIPITOR) tablet 40 mg  40 mg Oral Nightly Qamar Harley, APRN - CNP   40 mg at 12/20/21 2138    levothyroxine (SYNTHROID) tablet 150 mcg  150 mcg Oral Daily Qamar Harley, APRN - CNP   150 mcg at 12/21/21 0549    [Held by provider] torsemide (DEMADEX) tablet 20 mg  20 mg Oral BID Qamar Harley, APRN - CNP        midodrine (PROAMATINE) tablet 5 mg  5 mg Oral TID  Silvia Malone APRN - CNP   5 mg at 12/21/21 0820    insulin glargine (LANTUS) injection vial 6 Units  6 Units SubCUTAneous Nightly Silvia Malone APRN - CNP   6 Units at 12/20/21 2136    benzocaine-menthol (CEPACOL SORE THROAT) lozenge 1 lozenge  1 lozenge Oral Q3H PRN Qamar Harley, APRN - CNP        sodium chloride flush 0.9 % injection 5-40 mL  5-40 mL IntraVENous 2 times per day Martin Brito, APRN - CNP   10 mL at 12/1934    sodium chloride flush 0.9 % injection 5-40 mL  5-40 mL IntraVENous PRN Martin Brito, APRN - CNP        0.9 % sodium chloride infusion  25 mL IntraVENous PRN Martin Brito, APRN - CNP        ondansetron (ZOFRAN-ODT) disintegrating tablet 4 mg  4 mg Oral Q8H PRN Martin Brito, APRN - CNP        Or    ondansetron (ZOFRAN) injection 4 mg  4 mg IntraVENous Q6H PRN Martin Brito, APRN - CNP        polyethylene glycol (GLYCOLAX) packet 17 g  17 g Oral Daily Martin Brito, APRN - CNP   17 g at 12/19/21 0756    fleet rectal enema 1 enema  1 enema Rectal Daily PRN Martin Brito, APRN - CNP        0.9 % sodium chloride infusion   IntraVENous Continuous Leonardo Gamble MD 75 mL/hr at 12/21/21 1013 New Bag at 12/21/21 1013    acetaminophen (TYLENOL) tablet 650 mg  650 mg Oral Q4H PRN MESSI Nicolas - CNP        lidocaine 4 % external patch 3 patch  3 patch Topical Daily MESSI Nicolas - CNP   3 patch at 12/21/21 0820    famotidine (PEPCID) tablet 20 mg  20 mg Oral Every Other Day MESSI Nicolas - CNP   20 mg at 12/18/21 0930    cyclobenzaprine (FLEXERIL) tablet 10 mg  10 mg Oral TID PRN Joceline Moran APRN - CNP   10 mg at 12/21/21 0236    HYDROcodone-acetaminophen (NORCO) 5-325 MG per tablet 1 tablet  1 tablet Oral Q4H PRN Joceline Moran APRN - CNP   1 tablet at 12/19/21 0756    Or    HYDROcodone-acetaminophen (NORCO) 5-325 MG per tablet 2 tablet  2 tablet Oral Q4H PRN Joceline Moran APRN - CNP   2 tablet at 12/21/21 0236    insulin lispro (HUMALOG) injection vial 0-6 Units  0-6 Units SubCUTAneous TID HAMIDA Malone, APRN - CNP   1 Units at 12/19/21 1715    insulin lispro (HUMALOG) injection vial 0-3 Units  0-3 Units SubCUTAneous Nightly Fiorella Bis, APRN - CNP   1 Units at 12/20/21 2136    glucose (GLUTOSE) 40 % oral gel 15 g  15 g Oral PRN Silvia Gravesan, APRN - CNP        dextrose 50 % IV solution  12.5 g IntraVENous PRN Silvia Gravesan, APRN - CNP        glucagon (rDNA) injection 1 mg  1 mg IntraMUSCular PRN Silvia Malone, APRN - CNP        dextrose 5 % solution  100 mL/hr IntraVENous PRN Fiorella Bis, APRN - CNP           Allergies: Allergies   Allergen Reactions    Flomax [Tamsulosin Hcl] Other (See Comments)    Tamsulosin      Per Son/ Shirley Buffalo was discontinued due to patient having constant dripping from nose       Problem List:    Patient Active Problem List   Diagnosis Code    HTN (hypertension) I10    DM (diabetes mellitus) ([facility] E11.9    Chronic retention of urine, recurrent. Poss. neurgenic bladder from Diabetes. R33.9    Fall at home, initial encounter [address] Lawrence Sandhoff, Y92.009    Cardiomy[facility] I42.9    Paroxysmal atrial fibrillation (HCC) I48.0    Stage 3 chronic kidney disease (HCC) N18.30    SunDown syndrome F05    Gastro-esophageal reflux disease without esophagitis K21.9    Biventricular implantable cardioverter-defibrillator (ICD) in situ Z95.810    Closed fracture of eighth thoracic vertebra ([facility] S22.069A       Past Medical History:        Diagnosis Date    Arthritis     CHF (congestive heart failure) (HCC)     Constipation     Hypertension     Thyroid disease     Type II or unspecified type diabetes mellitus without mention of complication, not stated as uncontrolled        Past Surgical History:        Procedure Laterality Date    CHOLECYSTECTOMY      COLONOSCOPY      OTHER SURGICAL HISTORY  2013    Green light laser photoselective vaporization of prostate    PACEMAKER INSERTION  2011    with [address]    Ulcer       Social History:    Social History     Tobacco Use    Smoking status: Former Smoker     Quit date: 1983     Years since quittin.5    Smokeless tobacco: Never Used   Substance Use Topics    Alcohol use: No     Alcohol/week: 0.0 standard drinks                                Counseling given: Not Answered      Vital Signs (Current):   Vitals:    21 2000 21 0300 21 0309 21 0800   BP: (!) 140/80 126/65  135/75   Pulse: 90 88  96   Resp: 18 18  18   Temp: 98.2 °F (36.8 °C) 98.2 °F (36.8 °C)  97.6 °F (36.4 °C)   TempSrc: Oral Oral  Oral   SpO2: 95% 97%  97%   Weight:   204 lb 11.2 oz (92.9 kg)    Height:                                                  BP Readings from Last 3 Encounters:   21 135/75   16 134/84   16 124/72       NPO Status:                                                                                 BMI:   Wt Readings from Last 3 Encounters:   21 204 lb 11.2 oz (92.9 kg)   16 209 lb (94.8 kg)   16 209 lb 12.8 oz (95.2 kg)     Body mass index is 27.01 kg/m². CBC:   Lab Results   Component Value Date    WBC 4.5 12/21/2021    RBC 3.71 12/21/2021    HGB 11.6 12/21/2021    HCT 36.7 12/21/2021    MCV 98.9 12/21/2021    RDW 15.9 08/12/2013     12/21/2021       CMP:   Lab Results   Component Value Date     12/21/2021    K 4.6 12/21/2021    K 4.6 12/19/2021     12/21/2021    CO2 22 12/21/2021    BUN 33 12/21/2021    CREATININE 1.4 12/21/2021    LABGLOM 48 12/21/2021    GLUCOSE 134 12/21/2021    PROT 6.4 12/19/2021    CALCIUM 8.3 12/21/2021    BILITOT 0.9 12/19/2021    ALKPHOS 122 12/19/2021    AST 14 12/19/2021    ALT 10 12/19/2021       POC Tests:   Recent Labs     12/21/21  1205   POCGLU 110*       Coags:   Lab Results   Component Value Date    INR 1.07 12/18/2021    APTT 31.0 12/18/2021       HCG (If Applicable): No results found for: PREGTESTUR, PREGSERUM, HCG, HCGQUANT     ABGs: No results found for: PHART, PO2ART, GPK8DMF, OLB7NNR, BEART, I8LRYGPS     Type & Screen (If Applicable):  No results found for: LABABO, LABRH    Drug/Infectious Status (If Applicable):  No results found for: HIV, HEPCAB    COVID-19 Screening (If Applicable): No results found for: COVID19        Anesthesia Evaluation  Patient summary reviewed and Nursing notes reviewed  Airway: Mallampati: II        Dental:          Pulmonary: breath sounds clear to auscultation                             Cardiovascular:  Exercise tolerance: no interval change,   (+) hypertension:, CHF: no interval change,       ECG reviewed  Rhythm: regular  Rate: normal                    Neuro/Psych:               GI/Hepatic/Renal:   (+) GERD:,           Endo/Other:    (+) Diabetes, . Abdominal:       Abdomen: soft. Vascular: Other Findings:             Anesthesia Plan      general     ASA 3       Induction: intravenous. MIPS: Postoperative opioids intended and Prophylactic antiemetics administered.   Anesthetic plan and risks discussed with patient. Plan discussed with CRNA.                   [address] Hans Beckett, DO   12/21/2021

## 2021-12-22 LAB
ANION GAP SERPL CALCULATED.3IONS-SCNC: 10 MEQ/L (ref 8–16)
BUN BLDV-MCNC: 42 MG/DL (ref 7–22)
CALCIUM SERPL-MCNC: 8.6 MG/DL (ref 8.5–10.5)
CHLORIDE BLD-SCNC: 104 MEQ/L (ref 98–111)
CO2: 19 MEQ/L (ref 23–33)
CREAT SERPL-MCNC: 1.7 MG/DL (ref 0.4–1.2)
ERYTHROCYTE [DISTWIDTH] IN BLOOD BY AUTOMATED COUNT: 12.7 % (ref 11.5–14.5)
ERYTHROCYTE [DISTWIDTH] IN BLOOD BY AUTOMATED COUNT: 47 FL (ref 35–45)
GFR SERPL CREATININE-BSD FRML MDRD: 38 ML/MIN/1.73M2
GLUCOSE BLD-MCNC: 146 MG/DL (ref 70–108)
GLUCOSE BLD-MCNC: 166 MG/DL (ref 70–108)
GLUCOSE BLD-MCNC: 178 MG/DL (ref 70–108)
GLUCOSE BLD-MCNC: 203 MG/DL (ref 70–108)
GLUCOSE BLD-MCNC: 213 MG/DL (ref 70–108)
HCT VFR BLD CALC: 39.6 % (ref 42–52)
HEMOGLOBIN: 12.2 GM/DL (ref 14–18)
MCH RBC QN AUTO: 31 PG (ref 26–33)
MCHC RBC AUTO-ENTMCNC: 30.8 GM/DL (ref 32.2–35.5)
MCV RBC AUTO: 100.8 FL (ref 80–94)
PLATELET # BLD: 181 THOU/MM3 (ref 130–400)
PMV BLD AUTO: 9 FL (ref 9.4–12.4)
POTASSIUM SERPL-SCNC: 5.7 MEQ/L (ref 3.5–5.2)
RBC # BLD: 3.93 MILL/MM3 (ref 4.7–6.1)
SODIUM BLD-SCNC: 133 MEQ/L (ref 135–145)
WBC # BLD: 6.3 THOU/MM3 (ref 4.8–10.8)

## 2021-12-22 PROCEDURE — 99024 POSTOP FOLLOW-UP VISIT: CPT | Performed by: NEUROLOGICAL SURGERY

## 2021-12-22 PROCEDURE — 97535 SELF CARE MNGMENT TRAINING: CPT

## 2021-12-22 PROCEDURE — 85027 COMPLETE CBC AUTOMATED: CPT

## 2021-12-22 PROCEDURE — 80048 BASIC METABOLIC PNL TOTAL CA: CPT

## 2021-12-22 PROCEDURE — 36415 COLL VENOUS BLD VENIPUNCTURE: CPT

## 2021-12-22 PROCEDURE — 51701 INSERT BLADDER CATHETER: CPT

## 2021-12-22 PROCEDURE — 6370000000 HC RX 637 (ALT 250 FOR IP): Performed by: PHYSICIAN ASSISTANT

## 2021-12-22 PROCEDURE — 97110 THERAPEUTIC EXERCISES: CPT

## 2021-12-22 PROCEDURE — 99223 1ST HOSP IP/OBS HIGH 75: CPT | Performed by: PHYSICAL MEDICINE & REHABILITATION

## 2021-12-22 PROCEDURE — 97530 THERAPEUTIC ACTIVITIES: CPT

## 2021-12-22 PROCEDURE — 51798 US URINE CAPACITY MEASURE: CPT

## 2021-12-22 PROCEDURE — 82948 REAGENT STRIP/BLOOD GLUCOSE: CPT

## 2021-12-22 PROCEDURE — 1200000000 HC SEMI PRIVATE

## 2021-12-22 PROCEDURE — 99232 SBSQ HOSP IP/OBS MODERATE 35: CPT | Performed by: SURGERY

## 2021-12-22 PROCEDURE — 97167 OT EVAL HIGH COMPLEX 60 MIN: CPT

## 2021-12-22 PROCEDURE — 97162 PT EVAL MOD COMPLEX 30 MIN: CPT

## 2021-12-22 PROCEDURE — APPSS45 APP SPLIT SHARED TIME 31-45 MINUTES: Performed by: PHYSICIAN ASSISTANT

## 2021-12-22 PROCEDURE — 2580000003 HC RX 258: Performed by: PHYSICIAN ASSISTANT

## 2021-12-22 RX ADMIN — MIDODRINE HYDROCHLORIDE 5 MG: 5 TABLET ORAL at 18:26

## 2021-12-22 RX ADMIN — ATORVASTATIN CALCIUM 40 MG: 40 TABLET, FILM COATED ORAL at 20:30

## 2021-12-22 RX ADMIN — INSULIN LISPRO 2 UNITS: 100 INJECTION, SOLUTION INTRAVENOUS; SUBCUTANEOUS at 14:23

## 2021-12-22 RX ADMIN — INSULIN LISPRO 2 UNITS: 100 INJECTION, SOLUTION INTRAVENOUS; SUBCUTANEOUS at 18:27

## 2021-12-22 RX ADMIN — SODIUM CHLORIDE, PRESERVATIVE FREE 10 ML: 5 INJECTION INTRAVENOUS at 20:30

## 2021-12-22 RX ADMIN — POLYETHYLENE GLYCOL 3350 17 G: 17 POWDER, FOR SOLUTION ORAL at 08:23

## 2021-12-22 RX ADMIN — MIDODRINE HYDROCHLORIDE 5 MG: 5 TABLET ORAL at 08:23

## 2021-12-22 RX ADMIN — MIDODRINE HYDROCHLORIDE 5 MG: 5 TABLET ORAL at 14:25

## 2021-12-22 RX ADMIN — CARVEDILOL 6.25 MG: 6.25 TABLET, FILM COATED ORAL at 18:26

## 2021-12-22 RX ADMIN — CARVEDILOL 6.25 MG: 6.25 TABLET, FILM COATED ORAL at 08:23

## 2021-12-22 RX ADMIN — FAMOTIDINE 20 MG: 20 TABLET ORAL at 08:23

## 2021-12-22 RX ADMIN — SODIUM CHLORIDE, PRESERVATIVE FREE 10 ML: 5 INJECTION INTRAVENOUS at 08:29

## 2021-12-22 RX ADMIN — LEVOTHYROXINE SODIUM 150 MCG: 0.15 TABLET ORAL at 08:23

## 2021-12-22 ASSESSMENT — PAIN - FUNCTIONAL ASSESSMENT: PAIN_FUNCTIONAL_ASSESSMENT: ACTIVITIES ARE NOT PREVENTED

## 2021-12-22 ASSESSMENT — PAIN DESCRIPTION - DESCRIPTORS: DESCRIPTORS: ACHING;DULL

## 2021-12-22 ASSESSMENT — PAIN DESCRIPTION - ONSET: ONSET: ON-GOING

## 2021-12-22 ASSESSMENT — PAIN SCALES - GENERAL
PAINLEVEL_OUTOF10: 0
PAINLEVEL_OUTOF10: 0

## 2021-12-22 ASSESSMENT — PAIN DESCRIPTION - FREQUENCY: FREQUENCY: CONTINUOUS

## 2021-12-22 ASSESSMENT — PAIN DESCRIPTION - LOCATION: LOCATION: BACK

## 2021-12-22 ASSESSMENT — PAIN SCALES - WONG BAKER: WONGBAKER_NUMERICALRESPONSE: 4

## 2021-12-22 ASSESSMENT — PAIN DESCRIPTION - PROGRESSION: CLINICAL_PROGRESSION: NOT CHANGED

## 2021-12-22 ASSESSMENT — PAIN DESCRIPTION - PAIN TYPE: TYPE: ACUTE PAIN

## 2021-12-22 NOTE — OP NOTE
[address] [facility]    Patient name: Claude Rattler  Medical Record Number: [id number]  Account Number: [de-identified]      Date of Procedure: 12/21/2021    Pre-operative Diagnosis:      · Acute osteoporotic ( age related) pathological compression fracture at the level of T8.  · Osteopenia. · Severe intractable back pain. Post-operative Diagnosis: The same      PROCEDURE:     · Kyphoplasty and cement augmentation for acute osteoporotic ( age related) compression fracture at the level T8. Anesthesia: General endotracheal     Surgeon:  Nick Lawler MD   Assistant: Darrin Biggs PA-C     Estimated Blood Loss: Minimal     Drains: None     Blood Transfusions: None      Complications: none immediately appreciated     Specimens: From T8 acute compression fracture. Indications for Procedure: This is an 80years old male who developed severe back pain following a ground-level fall. Patient underwent a spinal spine CTs that showed finding consistent with acute pathological fracture at the level of T8. Unfortunately patient was not able to obtain T-spine spine MRIs because his base pacemaker. Patient continued to experience severe back pain and tenderness in his medial aspect of his back despite conservative treatment modalities. For this reason, patient was offered a surgical intervention in the form  T8 kyphoplasty. - The recommended surgical intervention was discussed with  patient and patient's family in detail including the associated risks and benefits, as well as, the alternative treatment options. - All questions and concerns were answered and addressed. - Patient  agreeed with  the recommended surgical intervention and patient  signed the surgical consent. PROCEDURE:     The patient was brought to the OR, and he was placed supine initially in his OR table.   Then, general anesthesia was inducted;  then IV line, Barrera catheter were inserted and maintained. Next, we flipped the patient on Michael table in supine position  given 2 grams of Ancef/ IV prior to the procedure for antibiotic prophylaxis. To facilitate the procedure biplanar fluoroscopy was used and by adjusting the angles of fluoroscopy both AP and lateral views of corresponding vertebral body of  T8  was  optimized. Skin over the back was prepped with antiseptic solution and the procedure was done under strict aspetic precautions. Then, draped and prepped patient in the standard fashion  Next:       by using #11 blade a small skin incission was made. The the working canula with trochar in place was inserted such that it lies over the lateral aspect of the pedicle. Then it was tapped slowly through the pedicle of T8  under constant fluoroscopic surveillance making sure that the medial border of the pedicle was not violated at any time. Next, a biopsy was taken from both sides using the biopsy device. Following that,  the Kyphon bone tamp was inserted therough the working canula in sides(15mm x 2mm ballon tamps). .This was done bipedicularly in similar fashion. Good placements were confirmed with fluoroscopy. In the meantime polymethylmethacrylate was mixed as per the protocol and Kyphon bone fillers were filled with it. Then they were immersed in warm water to obtain adequate consistency. 2  Balloons were inflated to 3 mL from the left and 3 mm from the right  to achieve adequate fracture reduction. Then the balloons where removed. Next,  the bone cement was placed in both balloon cavities, using fluoroscopic guidance. About 5.5 cc of pmma was filling the void completely with acceptable interdigitation of the bone cement          Waited 3 minutes for the pmma to harden then removed all instrumentation and closed. Once the cement had set and was seen to be in good position by fluoroscopy, the working canula and bone fillers were removed.   Final hemostasis was secured by applying pressure. The incisions were closed with staples, followed by sterile dressings. Sponge, needle, and instrument counts were correct at the end of the case. Patient tolerated the surgery very well without  intraoperative complications. At the end of surgery, patient was turned  back in his bed supine. Then,  she extubated and  transferred to the  PACU for further observation and treat.     Lilian Tejada MD, MD  Electronically signed by me on 12/22/2021 at 11:33 AM
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
## 2021-12-22 NOTE — PROGRESS NOTES
Post op day 1    S/P: T8 kyphoplasty    No acute event overnight. Doing well  Reported significant improvement in his preop back pain. A/A/Ox3  FCx4 with good strength through out   Sensory grossly intact. A/P:    Post op day 1    S/P: T8 kyphoplasty    - Doing well  - Encourage activity. - PT and OT  -inpatient rehab consultation.  -Follow-up with neurosurgery outpatient clinic after 2 weeks.   - From this time on, neurosurgery team will see this patient only as needed as long as in the hospital. Please call if you have any further questions or concerns regarding this patient.   -I discussed the case with patient, his family and with  trauma team.

## 2021-12-22 NOTE — PROCEDURES
A Bladder scan was performed at 0544 . The patient's last void was at unknown . The residual amount was measured to be >999 ML. Report of results was given to [facility] [place].

## 2021-12-22 NOTE — PROGRESS NOTES
Luis Waller 60  INPATIENT OCCUPATIONAL THERAPY  [facility] MED 5K  EVALUATION    Time:   Time In:   Time Out: 1213  Timed Code Treatment Minutes: 23 Minutes  Minutes: 38          Date: 2021  Patient Name: Juancarlos Guzman,   Gender: male      MRN: [id number]  : 10/25/1932  (80 y.o.)  Referring Practitioner: Raynaldo Kayser, PA-C  Diagnosis: Fall at [facility]  Additional Pertinent Hx: Errol Yen from standing position a couple days ago at the time EMS was called and they assisted him off the floor. Yesterday his pain was getting worse and therefore went to his local hospital at [facility]. There he was found to have fractures of his thoracic spine and was transferred to us for further evaluation. He does not take any blood thinners. He complains of pain in his neck and back. He had a negative CT cervical spine at the outside hospital and received a dose of fentanyl prior to transfer. Pt is S/P kyphoplasty at T8 on 21. Restrictions/Precautions:  Restrictions/Precautions: Fall Risk    Subjective  Chart Reviewed: Brendan Cole and Physical  Family / Caregiver Present: Yes (wife and daughter present and supportive)    Subjective: Pleasant and cooperative  Comments: RN approved session. Pt agreed to do some self care and sit up for lunch.     Pain:  Pain Assessment  Patient Currently in Pain: Yes  Pain Assessment: Faces  Corona-Baker Pain Rating: Hurts little more  Pain Type: Acute pain  Pain Location: Back  Pain Descriptors: Aching;Dull  Pain Frequency: Continuous  Clinical Progression: Not changed  Patient's Stated Pain Goal: 4  Response to Pain Intervention: Patient Satisfied  Multiple Pain Sites: No    Vitals: Vitals not assessed per clinical judgement, see nursing flowsheet    Social/Functional History:  Lives With: Spouse  Type of Home: House  Home Layout: One level  Home Access: Level entry (threshhold is 1 or 2 inches)  Home Equipment: Rolling walker,Cane   Bathroom Shower/Tub: Walk-in shower,Shower chair with back  Bathroom Toilet: Standard  Bathroom Accessibility: Accessible    Receives Help From: Family  ADL Assistance: Needs assistance  Homemaking Assistance: Needs assistance  Homemaking Responsibilities: No  Ambulation Assistance: Independent  Transfer Assistance: Independent    Active : No  Patient's  Info: Pt's wife has been driving for over him for over 1 year  Occupation: Retired  Leisure & Hobbies: Reading, going for walks, playing golf in the past  Additional Comments: Pt had been using a rolling walker at home. He does his own showering but has help with dressing. He has had help from spouse with managing medications. Per family, pt frequently loses his hearing aides and had to have help to locate them. VISION:Corrected    HEARING:  Corrected   Pt was not wearing his hearing aides    COGNITION: Decreased Recall, Decreased Insight, Decreased Problem Solving and Decreased Safety Awareness    RANGE OF MOTION:  Bilateral Upper Extremity:  WFL    STRENGTH:  Bilateral Upper Extremity:  Impaired - 3+/5 deltoid and pectoral; 4-/5 biceps; 4/5 triceps    SENSATION:   Dull sensation reported in his R foot since his ankle injury     ADL:   Upper Extremity Dressing: Moderate Assistance. donning a pullover top while sitting at the edge of bed  Lower Extremity Dressing: Maximum Assistance. donning his shorts and socks; help needed to start the shorts while in supine and to pull them up while standing. BALANCE:  Sitting Balance:  Contact Guard Assistance. donning a pullover top while at the edge of bed- forward posture noted  Standing Balance: Minimal Assistance.  leaning posteriorly while he had help to pull up the shorts    BED MOBILITY:  Rolling to Left: Contact Guard Assistance, with rail    Supine to Sit: Minimal Assistance, with head of bed raised, with rail    [facility], with rail      TRANSFERS:  Sit to Stand:  Minimal Assistance, with verbal cues. cues for pushing off of the mattress  Stand to Sit: Minimal Assistance. to the sofa seat with cues for sitting slowly    FUNCTIONAL MOBILITY:  Assistive Device: Rolling Walker  Assist Level:  Minimal Assistance. Distance: 5 ft from bed to the sofa seat  Pt had unsteadiness throughout. Forward posture noted. Activity Tolerance:  Patient tolerance of  treatment: fair. Pt stood for 40 seconds while having help pulling up his shorts. Mild SOB noted while walking and during bed mobility. He needed cues to slow down and not be in a hurry while doing activity. Assessment:  Assessment: Patient would benefit from continued skilled OT services to address above deficits. He presents with fall at home. He sustained fx of T8 vertebrae. He underwent kyphoplasty yesterday. Pt was able to ambulate without help using a rolling walker prior to admission. He did his self care with help for lower body dressing but able to take showers with supervision. Pt's spouse has helped with driving and with medication management. Pt demonstrated bed mobility with MIN A-CGA. He needed MIN A for transfers with cues for safety. He also needed MIN A while he walked with a rolling walker. MOD-MAX A needed for dressing. Fair standing balance but forward posture noted while upright. Possibly this  posture is his baseline. Performance deficits / Impairments: Decreased functional mobility ,Decreased balance,Decreased ADL status,Decreased strength,Decreased cognition,Decreased safe awareness,Decreased endurance  Prognosis: Good  REQUIRES OT FOLLOW UP: Yes  Decision Making: High Complexity    Treatment Initiated: Treatment and education initiated within context of evaluation.   Evaluation time included review of current medical information, gathering information related to past medical, social and functional history, completion of standardized testing, formal and informal observation of tasks, assessment of data and development of plan of care and goals. Treatment time included skilled education and facilitation of tasks to increase safety and independence with ADL's for improved functional independence and quality of life. Pt discussed his home routine including getting up each night to use the bathroom 1 time. Discussed with pt his experiences with having fallen. He was reminded to move slowly so that he can feel steady and not have any dizziness. Pt has been using a rolling walker regularly. Will need to follow up with safe practices when using a walker. Pt's family had asked about possibility of having a stay on IP Rehab. Discussed with them the benefit of additional time with therapy and having a regular schedule so that he can get stronger and more safe prior to returning home with help from spouse. Discharge Recommendations:  Continue to assess pending progress,Patient would benefit from continued therapy after discharge    Patient Education:  OT Education: Sigrid Meza of [facility] Education  Patient Education: Importance of going slowly when he gets up out of bed to walk so that his body can adjust to the change of position and avoid having an accident    Equipment Recommendations: Other: Will continue to monitor    Plan:  Times per week: 6x  Current Treatment Recommendations: Strengthening,Balance Training,Functional Mobility Training,Endurance Training,Safety Education & Training,Self-Care / ADL  Plan Comment: Pt would benefit from continued skilled OT services when medically stable and discharged from Acute. Gregg Lord is recommended. Specific instructions for Next Treatment: Functional mobility; ADLs and standing balance; UE exercises and back protection; safety awareness. See long-term goal time frame for expected duration of plan of care. If no long-term goals established, a short length of stay is anticipated.     Goals:  Patient goals : \"I want to return home and feel safe doing things for myself as much as I am able. \" pt states. Short term goals  Time Frame for Short term goals: By discharge  Short term goal 1: Pt will demonstrate functional mobility walking to/from the bathroom while using a rolling walker with no > than CGA to prepare for doing sinkside ADLs. Short term goal 2: Pt will complete grooming or dressing while standing and using 1 hand release with CGA to increase his balance and safety while doing his self care. Short term goal 3: Pt will complete transfers to/from an elevated toilet seat with armrests or a bedside commode with SBA to increase his independence with toileting routine. Short term goal 4: Pt will complete BUE ROM/moderate resistance exercises while following back protection techniques with demonstration to increase his endurance and strength for ease of doing ADLs and functional mobility. Short term goal 5: Pt will complete a spongebath or shower with no >than MIN A and verbal cues for safety to increase his independence with self care. Following session, patient left in safe position with all fall risk precautions in place.

## 2021-12-22 NOTE — CONSULTS
Full consult completed. Await evaluation per PT; will follow. Expect patient to be a good candidate for admission to the IPR Unit. Thank you for allowing us to participate in the care of your patient.     Lizette Meeks M.D.

## 2021-12-22 NOTE — PROGRESS NOTES
[facility] results. Straight cath orders placed. Per Lisette Emery, also can stop patient's IV fluids at this time. Patient straight cathed per order. 1000 ml of clear, daljit urine obtained.

## 2021-12-22 NOTE — PROGRESS NOTES
[address] [address]  INPATIENT PHYSICAL THERAPY  EVALUATION  [facility] ONC MED 5K - 5K-01/001-A    Time In: 5532  Time Out: 4109  Timed Code Treatment Minutes: 25 Minutes  Minutes: 34          Date: 2021  Patient Name: Julia Yuan,  Gender:  male        MRN: [id number]  : 10/25/1932  (80 y.o.)      Referring Practitioner: Sushil Singletary PA-C  Diagnosis: fall at home  Additional Pertinent Hx: Per EMR Kat Perea is an 80year old male presenting to the Emergency Department as a transfer in from [facility] for evaluation and treatment of a T8 vertebral body fracture from a fall sustained 3 days prior. He reports that he was experiencing increasing back pain so he went to the outlying ER for evaluation where they found a fracture of the T8 vertebral body. He denies hitting his head and denies loss of consciousness. He denies any numbness or tingling, no loss of bowel or bladder control. Only complaint is neck and back pain. \" Pt is s/p Kyphoplasty and cement augmentation for acute osteoporotic ( age related) compression fracture at the level T8 completed by Dr. Marianna Dominguez on . Restrictions/Precautions:  Restrictions/Precautions: Fall Risk,General Precautions  Position Activity Restriction  Other position/activity restrictions: avoid excessive trunk flexion    Subjective:  Chart Reviewed: Yes  Patient assessed for rehabilitation services?: Yes  Family / Caregiver Present: Yes (son)  Subjective: OK to see pt per nursing. Pt in bed when therapy arrived, pleasant and agreeable to PT session, son present. Pt and son educated on d/c plans during session, requesting to go to inpatient rehab.     General:  Overall Orientation Status: Impaired  Orientation Level: Oriented to person,Disoriented to time,Disoriented to situation  Follows Commands: Within Functional Limits    Vision: Impaired    Hearing: Exceptions to [facility]  Hearing Exceptions: Bilateral hearing aid,Hard of hearing/hearing concerns         Pain: mild pain reported in the back, did not give rating    Vitals: Vitals not assessed per clinical judgement, see nursing flowsheet    Social/Functional History:    Lives With: Spouse  Type of Home: House  Home Layout: One level  Home Access: Level entry (threshhold is 1 or 2 inches)  Home Equipment: Rolling walker,Cane     Bathroom Shower/Tub: Walk-in shower,Shower chair with back  Bathroom Toilet: Standard  Bathroom Accessibility: Accessible    Receives Help From: Family  ADL Assistance: Needs assistance  Homemaking Assistance: Needs assistance  Homemaking Responsibilities: No  Ambulation Assistance: Independent  Transfer Assistance: Independent    Active : No  Occupation: Retired  Leisure & Hobbies: Reading, going for walks, playing golf in the past  Additional Comments: Pt had been using a rolling walker at home. He does his own showering but has help with dressing. He has had help from spouse with managing medications. Per family, pt frequently loses his hearing aides and had to have help to locate them.     OBJECTIVE:  Range of Motion:  Bilateral Lower Extremity: WNL    Strength:  Bilateral Lower Extremity: Impaired - 4/5    Balance:  Static Sitting Balance:  Supervision, Stand By Assistance, X 1, with cues for safety, with verbal cues   Static Standing Balance: Contact Guard Assistance, Minimal Assistance, Moderate Assistance  RW for support with cues for safety, increased posterior lean once in standing, cues for correction to increase anterior lean, with assist required, fair demo  Bed Mobility:  Rolling to Right: Minimal Assistance, X 1, with head of bed raised, with rail, with verbal cues    Supine to Sit: Minimal Assistance, X 1, with head of bed raised, with rail, with verbal cues , with increased time for completion  Cues for proper technique of completion with education on log roll technique with fair demo  Transfers:  Sit to Stand: Minimal Assistance, Moderate Assistance, X 1, cues for hand placement, with verbal cues  Stand to [facility] [address], Minimal Assistance, X 1, cues for hand placement, with verbal cues  Cues for proper technique of completion with RW for support, fair demo. Ambulation:  Minimal Assistance, Moderate Assistance, X 1, with cues for safety, with verbal cues , with increased time for completion  Distance: 15 feet  Surface: Level Tile  Device:Rolling Walker  Gait Deviations: Forward Flexed Posture, Slow Leonie, Decreased Step Length Bilaterally, Decreased Weight Shift Bilaterally, Decreased Gait Speed, Decreased Heel Strike Bilaterally, Mild Path Deviations, Unsteady Gait and Decreased Terminal Knee Extension  Cues required for safety with increased sway in standing, 1 LOB due to unsteadiness with mod A for correction, cues for improved upright posture with fair demo as pt wanting to look at feet with amb. Exercise:  Patient was guided in 1 set(s) 10-15 reps of exercise to both lower extremities. Seated marches, Seated hamstring curls, Seated heel/toe raises, Long arc quads, Seated isometric hip adduction and Seated abduction/adduction. Exercises were completed for increased independence with functional mobility. Pt required VC and visual cues for proper technique of exercises for completion with good demo. Functional Outcome Measures: Completed  AM-PAC Inpatient Mobility Raw Score : 14  -PAC Inpatient T-Scale Score : 38.1    ASSESSMENT:  Activity Tolerance:  Patient tolerance of  treatment: good. Treatment Initiated: Treatment and education initiated within context of evaluation. Evaluation time included review of current medical information, gathering information related to past medical, social and functional history, completion of standardized testing, formal and informal observation of tasks, assessment of data and development of plan of care and goals.   Treatment time included skilled education and facilitation of tasks to increase safety and independence with functional mobility for improved independence and quality of life. Assessment: Body structures, Functions, Activity limitations: Decreased functional mobility ,Decreased balance,Decreased strength,Decreased safe awareness,Decreased cognition,Decreased endurance,Increased pain,Decreased posture  Assessment: Pt is an 80year old male s/p kyphoplasty of T8 who cont to require skilled PT services to progress with strength, balance, endurance and mobility to increase IND with functional tasks such as gait, transfers and bed mobility to return home safely and decrease reliance on family for support. Prognosis: Good    REQUIRES PT FOLLOW UP: Yes    Discharge Recommendations:  Discharge Recommendations: IP Rehab,Continue to assess pending progress,Patient would benefit from continued therapy after discharge  Patient is exhibiting above listed deficits and requiring continued therapy. Patient would benefit from continued therapy on an inpatient rehab unit. Patient is able to tolerate 3 hours of intensive therapy 5-6 days/week.  Without continued therapies pt at risk for functional decline, increased falls, and readmission to hospital.     Patient Education:  PT Education: [facility],Orientation,Transfer Training,Equipment,Functional Mobility Training,Family Education  Patient Education: d/c planning    Equipment Recommendations:  Equipment Needed: No    Plan:  Times per week: 6x O/T  Current Treatment Recommendations: Strengthening,Neuromuscular Re-education,Home Exercise Program,Safety Education & Training,Balance Training,Endurance Training,Patient/Caregiver Education & Training,Functional Mobility Training,Equipment Evaluation, Education, & procurement,Transfer Training,Gait Training,Stair training    Goals:  Patient goals : intpatient rehab and then return home  Short term goals  Time Frame for Short term goals: by discharge  Short term goal 1: Pt will amb with RW with S for 50 feet to progress with [place] distances. Short term goal 2: Pt will demo S for transfers with good safety and RW for support to progress towards PLOF. Short term goal 3: Pt will demo IND with bed mobility tasks with bed flat to progress towards PLOF safely. Short term goal 4: Pt will negotiate a step for small threshold into home with SBA for safety to progress with mobility. Long term goals  Time Frame for Long term goals : NA due to short ELOS    Following session, patient left in safe position with all fall risk precautions in place. Pt left in bedside chair with son present, all needs and call light in reach following session, nursing aware.

## 2021-12-22 NOTE — CARE COORDINATION
12/22/21, 12:26 PM EST    DISCHARGE ON GOING EVALUATION    [address] day: 4  Location: Replaced by [facility]01/001-A Reason for admit: Fall at home, initial encounter [W19. Manisha Hair, H47.726]  Fall at home, initial encounter [E39. Manisha Hair, Y92.009]   Procedure: 12/21/2021   · Kyphoplasty and cement augmentation for acute osteoporotic ( age related) compression fracture at the level T8.   Barriers to Discharge: Neurosurgery following as needed, PT/OT/ST, consults to PM & R and Acute Rehab, K 5.7, Creatinine 1.7, IV fluids, DM management, prn pain medications, activity as tolerated, incentive spirometry, SCD's, fall precautions. PCP: MESSI Garcia  Readmission Risk Score: 17 ( )%  Patient Goals/Plan/Treatment Preferences: Romeo Jones is from home with his wife. Consult to Acute Rehab in place.

## 2021-12-22 NOTE — PROGRESS NOTES
[facility] - Dr. Anthony Romero covering Dr. Orly Melgar  Daily Progress Note  Pt Name: Edgardo Quiros Record Number: [id number]  Date of Birth 10/25/1932   Today's Date: 12/22/2021    HD: # 4    CC: \"good\"    ASSESSMENT  1. Active Hospital Problems    Diagnosis Date Noted    Fall at home, initial encounter [W19. Georgie Angulo, N77.060] 12/18/2021    Closed fracture of eighth thoracic vertebra ([address] Geradine Packer 12/18/2021         PLAN  Admit to Trauma Services     Trauma by fall              - PT and OT when appropriate               - Fall precautions     T8 vertebral body fracture with anterior longitudinal ligament disruption   T6, T7 & T8 spinous process fractures              - Neurosurgery assisting with management              - Bedrest              - MRIs of the spine are unable to be completed as pacer/defib not compatible               - Neuro checks              - Maintain spinal neutrality              - Pain control   - S/p kyphoplasty yesterday with Dr. Elias Pastor consulted PM&R and signed off today     Hx of HTN, A-fib, DM2, CHF              - Hospitalist & cardiology assisting with management and surgical clearance     Pain control                   - Norco. Lidocaine, Morphine PRN     General diet, carb controlled  IVF hydration  Repeat labs in AM  Prophylaxis: SCDs, IS, C&DB, Pepcid, stool softeners  PT, OT, SLP eval and treat  Discharge disposition pending clinical course     SUBJECTIVE  Patient seen on 5K this morning. Patient stated he was doing \"good\" after surgery yesterday. Patient endorsed minimal back pain. Patient denied any other pain or complaints. Patient denied any headaches, lightheadedness, dizziness, neck pain, chest pain, shortness of breath, abdominal pain, nausea/vomiting, pain in extremities, and paresthesias. PM&R consulted for rehab recommendations post op. Discussed case with michelle Maher for disposition planning.  Labs and vital signs reviewed. Patient afebrile, vital signs stable. AM labs, no leukocytosis. Hgb stable 12.2. Cr elevated but around baseline. Repeat labs in AM.  Patient stable from a trauma surgery perspective. Awaiting PM&R for disposition planning. Case discussed with trauma surgeon, Dr. Jose Pathak. Wt Readings from Last 3 Encounters:   12/21/21 204 lb 11.2 oz (92.9 kg)   08/11/16 209 lb (94.8 kg)   02/16/16 209 lb 12.8 oz (95.2 kg)     Temp Readings from Last 3 Encounters:   12/22/21 97.3 °F (36.3 °C) (Oral)   02/16/16 96.4 °F (35.8 °C)     BP Readings from Last 3 Encounters:   12/22/21 (!) 108/55   12/21/21 (!) 104/54   02/16/16 134/84     Pulse Readings from Last 3 Encounters:   12/22/21 76   02/16/16 72   09/09/13 92       24 HR INTAKE/OUTPUT :     Intake/Output Summary (Last 24 hours) at 12/22/2021 1603  Last data filed at 12/22/2021 1330  Gross per 24 hour   Intake 1180 ml   Output 1000 ml   Net 180 ml     ADULT DIET; Regular    OBJECTIVE  CURRENT VITALS BP (!) 108/55   Pulse 76   Temp 97.3 °F (36.3 °C) (Oral)   Resp 18   Ht 6' 1\" (1.854 m)   Wt 204 lb 11.2 oz (92.9 kg)   SpO2 97%   BMI 27.01 kg/m²   GENERAL: Awake, alert, no acute distress, pleasant and cooperative with exam  HEENT: Normocephalic, pupils equal and reactive to light, nares patent bilaterally  NEURO: Alert and orient, GCS 15, follows commands, PMS intact in all four extremities, no signs of focal neurological deficits  CSPINE/BACK: No midline cervical tenderness to palpation  HEART: Regular rate and rhythm with no obvious murmurs, rubs, gallops. Distal pulses intact. LUNGS/CHEST WALL: Lungs are clear to auscultation bilaterally with no wheezes, rales, rhonchi. No respiratory distress or increased work of breathing. No chest wall tenderness to palpation. ABDOMEN: Abdomen soft, nondistended, with no tenderness to palpation. No guarding or peritoneal signs. Bowel sounds normal active  EXTREMITIES: No cyanosis or edema.   PMS intact in all four extremities. No extremity tenderness to palpation. Range of motion intact. Strength 5/5 bilaterally with  strength and plantar/dorsiflexion. SKIN: Warm and dry      LABS  CBC :   Recent Labs     12/21/21 0524 12/22/21 0719   WBC 4.5* 6.3   HGB 11.6* 12.2*   HCT 36.7* 39.6*   MCV 98.9* 100.8*    181     BMP:   Recent Labs     12/21/21 0524 12/22/21 0719    133*   K 4.6 5.7*    104   CO2 22* 19*   BUN 33* 42*   CREATININE 1.4* 1.7*     COAGS:   No results for input(s): APTT, PROT, INR in the last 72 hours. Pancreas/HFP:  No results for input(s): LIPASE, AMYLASE in the last 72 hours. No results for input(s): AST, ALT, BILIDIR, BILITOT, ALKPHOS in the last 72 hours. RADIOLOGY:  XR THORACIC SPINE (3 VIEWS)    Result Date: 12/21/2021  PROCEDURE: XR THORACIC SPINE (3 VIEWS) CLINICAL INFORMATION: T8 kypho. COMPARISON: Thoracic spine CT dated 12/17/2021. TECHNIQUE: Fluoroscopic support was provided for kyphoplasty. There are 14 saved fluoroscopic images. Fluoroscopy time = 192.3 seconds. FINDINGS/IMPRESSION:  Saved fluoroscopic images show instrumentation of the thoracic vertebral body through the bilateral pedicles with injection of radiopaque material within the vertebral body in keeping with history of kyphoplasty. Please refer to the operative report for  further formation. **This report has been created using voice recognition software. It may contain minor errors which are inherent in voice recognition technology. ** Final report electronically signed by Dr. Ede Rodriguez MD on 12/21/2021 7:05 PM    FLUORO FOR SURGICAL PROCEDURES    Result Date: 12/21/2021  Radiology exam is complete. No Radiologist dictation. Please follow up with ordering provider. Electronically signed by Neeru Cheek PA-C on 12/22/2021 at 4:03 PMPatient seen and examined independently by me. Above discussed and I agree with CNP. Labs, cultures, and radiographs where available were reviewed. See orders for the updated patient care plan.     Carlos Rivera MD MD, chart reviewed patient being seen for Dr. Laverne Phipps trauma by fall had kyphoplasty yesterday still having back pain but he states is improved lungs are clear abdomen is soft hemoglobin stable at 12.2 rehab has been consulted  12/22/2021   7:14 PM

## 2021-12-23 LAB
ANION GAP SERPL CALCULATED.3IONS-SCNC: 16 MEQ/L (ref 8–16)
BUN BLDV-MCNC: 43 MG/DL (ref 7–22)
CALCIUM SERPL-MCNC: 8.5 MG/DL (ref 8.5–10.5)
CHLORIDE BLD-SCNC: 102 MEQ/L (ref 98–111)
CO2: 19 MEQ/L (ref 23–33)
CREAT SERPL-MCNC: 1.7 MG/DL (ref 0.4–1.2)
EKG ATRIAL RATE: 105 BPM
EKG Q-T INTERVAL: 442 MS
EKG QRS DURATION: 162 MS
EKG QTC CALCULATION (BAZETT): 537 MS
EKG R AXIS: -58 DEGREES
EKG T AXIS: -8 DEGREES
EKG VENTRICULAR RATE: 89 BPM
ERYTHROCYTE [DISTWIDTH] IN BLOOD BY AUTOMATED COUNT: 12.7 % (ref 11.5–14.5)
ERYTHROCYTE [DISTWIDTH] IN BLOOD BY AUTOMATED COUNT: 47.1 FL (ref 35–45)
GFR SERPL CREATININE-BSD FRML MDRD: 38 ML/MIN/1.73M2
GLUCOSE BLD-MCNC: 146 MG/DL (ref 70–108)
GLUCOSE BLD-MCNC: 154 MG/DL (ref 70–108)
GLUCOSE BLD-MCNC: 160 MG/DL (ref 70–108)
GLUCOSE BLD-MCNC: 179 MG/DL (ref 70–108)
HCT VFR BLD CALC: 36.3 % (ref 42–52)
HEMOGLOBIN: 11.2 GM/DL (ref 14–18)
MCH RBC QN AUTO: 31.1 PG (ref 26–33)
MCHC RBC AUTO-ENTMCNC: 30.9 GM/DL (ref 32.2–35.5)
MCV RBC AUTO: 100.8 FL (ref 80–94)
PLATELET # BLD: 206 THOU/MM3 (ref 130–400)
PMV BLD AUTO: 9 FL (ref 9.4–12.4)
POTASSIUM SERPL-SCNC: 4.6 MEQ/L (ref 3.5–5.2)
POTASSIUM SERPL-SCNC: 5.5 MEQ/L (ref 3.5–5.2)
RBC # BLD: 3.6 MILL/MM3 (ref 4.7–6.1)
SODIUM BLD-SCNC: 137 MEQ/L (ref 135–145)
WBC # BLD: 6 THOU/MM3 (ref 4.8–10.8)

## 2021-12-23 PROCEDURE — 6370000000 HC RX 637 (ALT 250 FOR IP): Performed by: PHYSICIAN ASSISTANT

## 2021-12-23 PROCEDURE — 80048 BASIC METABOLIC PNL TOTAL CA: CPT

## 2021-12-23 PROCEDURE — 82948 REAGENT STRIP/BLOOD GLUCOSE: CPT

## 2021-12-23 PROCEDURE — 97110 THERAPEUTIC EXERCISES: CPT

## 2021-12-23 PROCEDURE — 84132 ASSAY OF SERUM POTASSIUM: CPT

## 2021-12-23 PROCEDURE — 2580000003 HC RX 258: Performed by: PHYSICIAN ASSISTANT

## 2021-12-23 PROCEDURE — APPSS45 APP SPLIT SHARED TIME 31-45 MINUTES: Performed by: PHYSICIAN ASSISTANT

## 2021-12-23 PROCEDURE — 36415 COLL VENOUS BLD VENIPUNCTURE: CPT

## 2021-12-23 PROCEDURE — 85027 COMPLETE CBC AUTOMATED: CPT

## 2021-12-23 PROCEDURE — 1200000000 HC SEMI PRIVATE

## 2021-12-23 PROCEDURE — 97535 SELF CARE MNGMENT TRAINING: CPT

## 2021-12-23 PROCEDURE — 97530 THERAPEUTIC ACTIVITIES: CPT

## 2021-12-23 PROCEDURE — 99024 POSTOP FOLLOW-UP VISIT: CPT | Performed by: SURGERY

## 2021-12-23 PROCEDURE — 93005 ELECTROCARDIOGRAM TRACING: CPT | Performed by: PHYSICIAN ASSISTANT

## 2021-12-23 RX ORDER — SODIUM POLYSTYRENE SULFONATE 15 G/60ML
15 SUSPENSION ORAL; RECTAL ONCE
Status: COMPLETED | OUTPATIENT
Start: 2021-12-23 | End: 2021-12-23

## 2021-12-23 RX ADMIN — HYDROCODONE BITARTRATE AND ACETAMINOPHEN 1 TABLET: 5; 325 TABLET ORAL at 04:45

## 2021-12-23 RX ADMIN — MIDODRINE HYDROCHLORIDE 5 MG: 5 TABLET ORAL at 17:12

## 2021-12-23 RX ADMIN — CARVEDILOL 6.25 MG: 6.25 TABLET, FILM COATED ORAL at 08:36

## 2021-12-23 RX ADMIN — ATORVASTATIN CALCIUM 40 MG: 40 TABLET, FILM COATED ORAL at 21:28

## 2021-12-23 RX ADMIN — SODIUM CHLORIDE, PRESERVATIVE FREE 10 ML: 5 INJECTION INTRAVENOUS at 08:36

## 2021-12-23 RX ADMIN — SODIUM CHLORIDE: 9 INJECTION, SOLUTION INTRAVENOUS at 02:52

## 2021-12-23 RX ADMIN — SODIUM CHLORIDE, PRESERVATIVE FREE 10 ML: 5 INJECTION INTRAVENOUS at 21:43

## 2021-12-23 RX ADMIN — INSULIN GLARGINE 6 UNITS: 100 INJECTION, SOLUTION SUBCUTANEOUS at 22:25

## 2021-12-23 RX ADMIN — INSULIN LISPRO 1 UNITS: 100 INJECTION, SOLUTION INTRAVENOUS; SUBCUTANEOUS at 22:25

## 2021-12-23 RX ADMIN — SODIUM CHLORIDE, PRESERVATIVE FREE 10 ML: 5 INJECTION INTRAVENOUS at 02:49

## 2021-12-23 RX ADMIN — MIDODRINE HYDROCHLORIDE 5 MG: 5 TABLET ORAL at 08:36

## 2021-12-23 RX ADMIN — CARVEDILOL 6.25 MG: 6.25 TABLET, FILM COATED ORAL at 17:12

## 2021-12-23 RX ADMIN — SODIUM POLYSTYRENE SULFONATE 15 G: 15 SUSPENSION ORAL; RECTAL at 11:13

## 2021-12-23 RX ADMIN — POLYETHYLENE GLYCOL 3350 17 G: 17 POWDER, FOR SOLUTION ORAL at 08:38

## 2021-12-23 RX ADMIN — LEVOTHYROXINE SODIUM 150 MCG: 0.15 TABLET ORAL at 05:55

## 2021-12-23 ASSESSMENT — PAIN SCALES - GENERAL
PAINLEVEL_OUTOF10: 0
PAINLEVEL_OUTOF10: 6
PAINLEVEL_OUTOF10: 6
PAINLEVEL_OUTOF10: 0

## 2021-12-23 NOTE — PROGRESS NOTES
Abi Reardon covering Dr. Crystal Israel  Daily Progress Note  Pt Name: Gloria Camp  Medical Record Number: [id number]  Date of Birth 10/25/1932   Today's Date: 12/23/2021    HD: # 5    CC: \"good\"    ASSESSMENT  1. Active Hospital Problems    Diagnosis Date Noted    Fall at home [U48. Lawrence Ricoander, I34.122] 12/18/2021    Closed fracture of eighth thoracic vertebra ([address] Darwyn Moat 12/18/2021         PLAN  Admit to Trauma Services     Trauma by fall              - PT and OT when appropriate               - Fall precautions     T8 vertebral body fracture with anterior longitudinal ligament disruption   T6, T7 & T8 spinous process fractures              - Neurosurgery assisting with management              - Bedrest              - MRIs of the spine are unable to be completed as pacer/defib not compatible               - Neuro checks              - Maintain spinal neutrality              - Pain control   - S/p kyphoplasty 12/21 with Dr. Willetta Sicard   - Annabella Faulkner consulted PM&R and signed off/22    Hyperkalemia   -Order Kayexalate   -Repeat CMP tomorrow    Hx of HTN, A-fib, DM2, CHF              - Hospitalist & cardiology assisting with management and surgical clearance     Pain control                   - Norco. Lidocaine, Morphine PRN     General diet, carb controlled  IVF hydration  Repeat labs in AM  Prophylaxis: SCDs, IS, C&DB, Pepcid, stool softeners  PT, OT, SLP eval and treat  Discharge disposition pending clinical course   -Patient stable from trauma perspective   -Anticipated discharge to [facility] in the next 24 h     SUBJECTIVE  He is a 80 y.o. male who continues to present at [address] [place] [address] on 5K following a fall. Patient states tolerating kyphoplasty, no significant pain. Patient denies chest pain, shortness of breath, cough, headache, dizziness, lightheadedness, numbness, paraesthesias, weakness, chills, fevers, abdominal pain, nausea, vomiting,neck pain, or back pain.   Nursing staff states patient hyperkalemic at 5.5, Kayexalate ordered. Vital signs stable on exam. Care in coordination with trauma surgeon Dr. Erik Monroy. Wt Readings from Last 3 Encounters:   12/21/21 204 lb 11.2 oz (92.9 kg)   08/11/16 209 lb (94.8 kg)   02/16/16 209 lb 12.8 oz (95.2 kg)     Temp Readings from Last 3 Encounters:   12/23/21 98.3 °F (36.8 °C) (Oral)   02/16/16 96.4 °F (35.8 °C)     BP Readings from Last 3 Encounters:   12/23/21 (!) 121/59   12/21/21 (!) 104/54   02/16/16 134/84     Pulse Readings from Last 3 Encounters:   12/23/21 89   02/16/16 72   09/09/13 92       24 HR INTAKE/OUTPUT :     Intake/Output Summary (Last 24 hours) at 12/23/2021 1024  Last data filed at 12/23/2021 0741  Gross per 24 hour   Intake 2203.41 ml   Output 1225 ml   Net 978.41 ml     ADULT DIET; Regular; 4 carb choices (60 gm/meal)    OBJECTIVE  CURRENT VITALS BP (!) 121/59   Pulse 89   Temp 98.3 °F (36.8 °C) (Oral)   Resp 18   Ht 6' 1\" (1.854 m)   Wt 204 lb 11.2 oz (92.9 kg)   SpO2 98%   BMI 27.01 kg/m²   GENERAL: Presents  upright in chair eating breakfast unassisted, Awake and alert; In no acute distress and well nourished. SKIN: Appropriate for ethnicity, warm and dry. ENT: No apparent trauma, discharge, or hematoma bilaterally. PERRL at 2mm. Nares patient, membranes moist  CARDIO: No visible chest wall deformity. Strong/regular S1/S2. 2+ radial and DP pulses bilaterally. Capillary refill <2 sec. No extremity edema noted. PULMONARY:  Trachea midline, no increased respiratory effort, or paradoxical chest movement. Lung sounds are clear to ascultation in all fields without adventitious sounds. ABDOMEN: Abdomen is soft, non distended. Bowel sounds present in all four quadrants. NTTP in all quadrants   NEURO: GCS 15. PMS intact, moves limbs freely. No focal neurological deficits  MSK: Extremities intact and present. No new bruising, swelling, deformity, discoloration or bleeding. NTTP over major muscle groups.   strength 5/5 and equal bilaterally. LABS  CBC :   Recent Labs     12/21/21  0524 12/22/21 0719 12/23/21 0516   WBC 4.5* 6.3 6.0   HGB 11.6* 12.2* 11.2*   HCT 36.7* 39.6* 36.3*   MCV 98.9* 100.8* 100.8*    181 206     BMP:   Recent Labs     12/21/21 0524 12/22/21 0719 12/23/21 0516    133* 137   K 4.6 5.7* 5.5*    104 102   CO2 22* 19* 19*   BUN 33* 42* 43*   CREATININE 1.4* 1.7* 1.7*     COAGS:   No results for input(s): APTT, PROT, INR in the last 72 hours. Pancreas/HFP:  No results for input(s): LIPASE, AMYLASE in the last 72 hours. No results for input(s): AST, ALT, BILIDIR, BILITOT, ALKPHOS in the last 72 hours. RADIOLOGY:  XR THORACIC SPINE (3 VIEWS)    Result Date: 12/21/2021  PROCEDURE: XR THORACIC SPINE (3 VIEWS) CLINICAL INFORMATION: T8 kypho. COMPARISON: Thoracic spine CT dated 12/17/2021. TECHNIQUE: Fluoroscopic support was provided for kyphoplasty. There are 14 saved fluoroscopic images. Fluoroscopy time = 192.3 seconds. FINDINGS/IMPRESSION:  Saved fluoroscopic images show instrumentation of the thoracic vertebral body through the bilateral pedicles with injection of radiopaque material within the vertebral body in keeping with history of kyphoplasty. Please refer to the operative report for  further formation. **This report has been created using voice recognition software. It may contain minor errors which are inherent in voice recognition technology. ** Final report electronically signed by Dr. Susana Marie MD on 12/21/2021 7:05 PM    FLUORO FOR SURGICAL PROCEDURES    Result Date: 12/21/2021  Radiology exam is complete. No Radiologist dictation. Please follow up with ordering provider. Electronically signed by ANGELICA Cuellar on 12/23/2021 at 10:24 AM    Patient seen and evaluated. Status post kyphoplasty. Patient up in chair he actually feels pretty good. Discussed with CPAP, PA-C plan is for inpatient rehab. All new data reviewed.   Potassium remains high at 5 5 treated with Kayexalate. Agree as documented.

## 2021-12-23 NOTE — PROGRESS NOTES
[address] [address]  INPATIENT PHYSICAL THERAPY  DAILY NOTE  [facility] ONC MED 5K - 5K-01/001-A  Time In: 3871  Time Out: 1149  Timed Code Treatment Minutes: 25 Minutes  Minutes: 25          Date: 2021  Patient Name: Oanh Roblero,  Gender:  male        MRN: [id number]  : 10/25/1932  (80 y.o.)     Referring Practitioner: Christin Bird PA-C  Diagnosis: fall at home  Additional Pertinent Hx: Per EMR Vonne Harada is an 80year old male presenting to the Emergency Department as a transfer in from [facility] for evaluation and treatment of a T8 vertebral body fracture from a fall sustained 3 days prior. He reports that he was experiencing increasing back pain so he went to the outlying ER for evaluation where they found a fracture of the T8 vertebral body. He denies hitting his head and denies loss of consciousness. He denies any numbness or tingling, no loss of bowel or bladder control. Only complaint is neck and back pain. \" Pt is s/p Kyphoplasty and cement augmentation for acute osteoporotic ( age related) compression fracture at the level T8 completed by Dr. Jose Beck on . Prior Level of Function:  Lives With: Spouse  Type of Home: House  Home Layout: One level  Home Access: Level entry (threshhold is 1 or 2 inches)  Home Equipment: Rolling walker,Cane   Bathroom Shower/Tub: Walk-in shower,Shower chair with back  Bathroom Toilet: Standard  Bathroom Accessibility: Accessible    Receives Help From: Family  ADL Assistance: Needs assistance  Homemaking Assistance: Needs assistance  Homemaking Responsibilities: No  Ambulation Assistance: Independent  Transfer Assistance: Independent  Active : No  Additional Comments: Pt had been using a rolling walker at home. He does his own showering but has help with dressing. He has had help from spouse with managing medications.   Per family, pt frequently loses his hearing aides and had to have help to locate them.    Restrictions/Precautions:  Restrictions/Precautions: Fall Risk,General Precautions  Position Activity Restriction  Other position/activity restrictions: avoid excessive trunk flexion     SUBJECTIVE: Ok to see pt per nursing. Nursing reporting pt to be transported to [facility] soon. Upon arrival, pt in bedside chair with daughter present, agreeable to PT session. Once transport in, PT helped assist with transporting to and from transport chair. PAIN: denies    Vitals: Vitals not assessed per clinical judgement, see nursing flowsheet    OBJECTIVE:  Bed Mobility:  Not Tested    Transfers:  Sit to Stand: Contact Guard Assistance, Minimal Assistance, X 1, cues for hand placement, with verbal cues  Stand to Sit:Contact Guard Assistance, Minimal Assistance, X 1, cues for hand placement, with verbal cues  RW for support once in standing, cues for safety and proper sequencing with mobility to decrease falls, unsteadiness noted  Ambulation:  Contact Guard Assistance, Minimal Assistance, X 1, with cues for safety, with verbal cues   Distance: 15 ft, 7 feet  Surface: Level Tile  Device:Rolling Walker  Gait Deviations: Forward Flexed Posture, Slow Leonie, Decreased Step Length Bilaterally, Decreased Weight Shift Bilaterally, Decreased Gait Speed, Decreased Heel Strike Bilaterally, Wide Base of Support, Unsteady Gait and Decreased Terminal Knee Extension  Cues for overall safety and proper sequencing to increase safety and decrease falls, fair demo, pt fear of falling with reporting  Balance:  Static Sitting Balance:  Supervision  Static Standing Balance: Contact Guard Assistance, X 1, with cues for safety, with verbal cues    RW for support once in standing  Exercise:  Patient was guided in 1 set(s) 15 reps of exercise to both lower extremities. Ankle pumps, Glut sets, Quad sets, Heelslides, Hip abduction/adduction and Straight leg raises. Exercises were completed for increased independence with functional mobility. Pt required VC and visual cues for proper technique of exercises for completion with good demo. Functional Outcome Measures: Completed  AM-PAC Inpatient Mobility Raw Score : 16  AM-PAC Inpatient T-Scale Score : 40.78    ASSESSMENT:  Assessment: Patient progressing toward established goals. Activity Tolerance:  Patient tolerance of  treatment: good. Equipment Recommendations:Equipment Needed: No  Discharge Recommendations: Inpatient Rehabilitation and Patient would benefit from continued PT at discharge  Plan: Times per week: 6x O/T  Current Treatment Recommendations: Strengthening,Neuromuscular Re-education,Home Exercise Program,Safety Education & Tod Curb Training,Patient/Caregiver Education & Training,Functional Mobility Training,Equipment Evaluation, Education, & procurement,Transfer Training,Gait Training,Stair training    Patient Education  Patient Education: Plan of Care, Home Exercise Program, Family Education, Equipment Education, Transfers, Gait, Use of Gait Belt, Verbal Exercise Instruction,  - Patient Verbalized Understanding, - Patient Requires Continued Education    Goals:  Patient goals : intpatient rehab and then return home  Short term goals  Time Frame for Short term goals: by discharge  Short term goal 1: Pt will amb with RW with S for 50 feet to progress with [facility] distances. Short term goal 2: Pt will demo S for transfers with good safety and RW for support to progress towards PLOF. Short term goal 3: Pt will demo IND with bed mobility tasks with bed flat to progress towards PLOF safely. Short term goal 4: Pt will negotiate a step for small threshold into home with SBA for safety to progress with mobility. Long term goals  Time Frame for Long term goals : NA due to short ELOS    Following session, patient left in safe position with all fall risk precautions in place.  Pt left in bedside chair with all needs and call light in reach following session, nursing aware, daughter present.

## 2021-12-23 NOTE — PROGRESS NOTES
TRANSFER patient to [phone]. TRANSFER--NOT DISCHARGE/READMIT. Call [place], [phone] for report. House supervisor updated, CM updated, Primary RN updated.

## 2021-12-23 NOTE — PROGRESS NOTES
Physician Progress Note      PATIENT:               Gordo Carpio  CSN #:                  [id number]  :                       10/25/1932  ADMIT DATE:       2021 12:26 AM  DISCH DATE:  RESPONDING  PROVIDER #:        Cheikh Beaulieu MD          QUERY TEXT:    Pt admitted with T6, T7 & T8 spinous process fractures . Pt noted to have fall   from same level with Osteopenic bones on imaging. If possible, please make   selection below, document in progress notes and discharge summary if you are   evaluating and/or treating any of the following: The medical record reflects the following:  Risk Factors: advanced age, history of arthritis, and falls  Clinical Indicators: imaging reflects \" Bones are osteopenic\" , fall from same   ground  level . Treatment: Admission, imaging, pain control, Neurosurgery , internal medicine   eval, conservative treatment with consideration of Kyphoplasty    Thank You,  Brian Beck  RN, BSN, [address] [place] [address]   P: [phone]  F: [phone]  Options provided:  -- Pathological T6, T7 & T8 spinous process fractures  fracture due to   osteopenia  -- Pathological T6, T7 & T8 spinous process fractures  due to osteopenia   following fall which would not usually break a normal, healthy bone  -- Traumatic T6, T7 & T8 spinous process fractures  -- Other - I will add my own diagnosis  -- Disagree - Not applicable / Not valid  -- Disagree - Clinically unable to determine / Unknown  -- Refer to Clinical Documentation Reviewer    PROVIDER RESPONSE TEXT:    1- Traumatic T6, T7 and T8 spinous process fractures. 2- Traumatic pathological fracture at the vertebral body of T8.     Query created by: Mary Sandoval on 2021 10:54 AM      Electronically signed by:  Cheikh Beaulieu MD 2021 8:46 AM

## 2021-12-23 NOTE — PROGRESS NOTES
[facility]  Occupational Therapy  Daily Note  Time:    Time In: 674  Time Out: 1103  Timed Code Treatment Minutes: 23 Minutes  Minutes: 23          Date: 2021  Patient Name: Julia Yuan,   Gender: male      Room: 8E-70/8E-70A  MRN: [id number]  : 10/25/1932  (80 y.o.)  Referring Practitioner: Huy Mesa PA-C  Diagnosis: Fall at [facility]  Additional Pertinent Hx: Dianne Israel from standing position a couple days ago at the time EMS was called and they assisted him off the floor. Yesterday his pain was getting worse and therefore went to his local hospital at [facility]. There he was found to have fractures of his thoracic spine and was transferred to us for further evaluation. He does not take any blood thinners. He complains of pain in his neck and back. He had a negative CT cervical spine at the outside hospital and received a dose of fentanyl prior to transfer. Pt is S/P kyphoplasty at T8 on 21. Restrictions/Precautions:  Restrictions/Precautions: Fall Risk,General Precautions  Position Activity Restriction  Other position/activity restrictions: avoid excessive trunk flexion      SUBJECTIVE: Pt sitting up in bedside chair with daughter present. Pt stating he wants to use bathroom. PAIN: 0 /10:      Vitals: Vitals not assessed per clinical judgement, see nursing flowsheet    COGNITION: Decreased Recall, Decreased Insight, Decreased Problem Solving and Decreased Safety Awareness slight confusion noted thorughout session. Pt unable to carry over education on safety awareness throughout session. Pt educated on importance of not getting up on own with pt attempting to complete from [facility] on several occasions. Pt     ADL:   Lower Extremity Dressing: Maximum Assistance. donning new pull up   Toileting: Contact Guard Assistance. completing hygiene in standing.  expect pt would require assistance for hygiene if he had a bowel movement  Toilet Transfer: Minimal Assistance. form BSC with cues for safety. BALANCE:  Sitting Balance:  Stand By Assistance. Standing Balance: Minimal Assistance. to CGA with bilateral UE support on walker     BED MOBILITY:  Not Tested    TRANSFERS:  Sit to Stand:  Minimal Assistance. from bedside chir  Stand to Sit: Minimal Assistance. into bedside cahir wiht max cues to turn all the way around and step all the way back d/t pt atempting to sit to early    FUNCTIONAL MOBILITY:  Assistive Device: Rolling Walker  Assist Level:  Minimal Assistance. Distance: to [facility] x 6 feet  Pt unsteady throughout requiring cues for safety and reminder on where he was suppose to return to. ASSESSMENT:     Activity Tolerance:  Patient tolerance of  treatment: good. Discharge Recommendations: Inpatient Rehabilitation  Equipment Recommendations: Other: Will continue to monitor  Plan: Times per week: 6x  Specific instructions for Next Treatment: Functional mobility; ADLs and standing balance; UE exercises and back protection; safety awareness  Current Treatment Recommendations: Strengthening,Balance Training,Functional Mobility Training,Endurance Training,Safety Education & Training,Self-Care / ADL  Plan Comment: Pt would benefit from continued skilled OT services when medically stable and discharged from Acute. Marely Hernández is recommended. Patient Education  Patient Education: safeyt with transfers and mobility    Goals  Short term goals  Time Frame for Short term goals: By discharge  Short term goal 1: Pt will demonstrate functional mobility walking to/from the bathroom while using a rolling walker with no > than CGA to prepare for doing sinkside ADLs. Short term goal 2: Pt will complete grooming or dressing while standing and using 1 hand release with CGA to increase his balance and safety while doing his self care.   Short term goal 3: Pt will complete transfers to/from an elevated toilet seat with armrests or a bedside commode with SBA to increase his independence with toileting routine. Short term goal 4: Pt will complete BUE ROM/moderate resistance exercises while following back protection techniques with demonstration to increase his endurance and strength for ease of doing ADLs and functional mobility. Short term goal 5: Pt will complete a spongebath or shower with no >than MIN A and verbal cues for safety to increase his independence with self care. Following session, patient left in safe position with all fall risk precautions in place.

## 2021-12-24 ENCOUNTER — APPOINTMENT (OUTPATIENT)
Dept: CT IMAGING | Age: 86
DRG: 477 | End: 2021-12-24
Payer: MEDICARE

## 2021-12-24 ENCOUNTER — APPOINTMENT (OUTPATIENT)
Dept: GENERAL RADIOLOGY | Age: 86
DRG: 477 | End: 2021-12-24
Payer: MEDICARE

## 2021-12-24 LAB
ALBUMIN SERPL-MCNC: 3.2 G/DL (ref 3.5–5.1)
ALP BLD-CCNC: 117 U/L (ref 38–126)
ALT SERPL-CCNC: 9 U/L (ref 11–66)
ANION GAP SERPL CALCULATED.3IONS-SCNC: 9 MEQ/L (ref 8–16)
AST SERPL-CCNC: 17 U/L (ref 5–40)
BACTERIA: ABNORMAL /HPF
BILIRUB SERPL-MCNC: 0.5 MG/DL (ref 0.3–1.2)
BILIRUBIN URINE: NEGATIVE
BLOOD, URINE: NEGATIVE
BUN BLDV-MCNC: 30 MG/DL (ref 7–22)
CALCIUM SERPL-MCNC: 8.5 MG/DL (ref 8.5–10.5)
CASTS 2: ABNORMAL /LPF
CASTS UA: ABNORMAL /LPF
CHARACTER, URINE: CLEAR
CHLORIDE BLD-SCNC: 103 MEQ/L (ref 98–111)
CO2: 25 MEQ/L (ref 23–33)
COLOR: YELLOW
CREAT SERPL-MCNC: 1.4 MG/DL (ref 0.4–1.2)
CRYSTALS, UA: ABNORMAL
EKG Q-T INTERVAL: 462 MS
EKG QRS DURATION: 176 MS
EKG QTC CALCULATION (BAZETT): 542 MS
EKG R AXIS: -61 DEGREES
EKG T AXIS: -15 DEGREES
EKG VENTRICULAR RATE: 83 BPM
EPITHELIAL CELLS, UA: ABNORMAL /HPF
GFR SERPL CREATININE-BSD FRML MDRD: 48 ML/MIN/1.73M2
GLUCOSE BLD-MCNC: 112 MG/DL (ref 70–108)
GLUCOSE BLD-MCNC: 113 MG/DL (ref 70–108)
GLUCOSE BLD-MCNC: 119 MG/DL (ref 70–108)
GLUCOSE BLD-MCNC: 138 MG/DL (ref 70–108)
GLUCOSE BLD-MCNC: 141 MG/DL (ref 70–108)
GLUCOSE BLD-MCNC: 146 MG/DL (ref 70–108)
GLUCOSE URINE: NEGATIVE MG/DL
KETONES, URINE: NEGATIVE
LEUKOCYTE ESTERASE, URINE: NEGATIVE
MISCELLANEOUS 2: ABNORMAL
NITRITE, URINE: NEGATIVE
PH UA: 5 (ref 5–9)
POTASSIUM SERPL-SCNC: 4.4 MEQ/L (ref 3.5–5.2)
PROTEIN UA: 30
RBC URINE: ABNORMAL /HPF
RENAL EPITHELIAL, UA: ABNORMAL
SODIUM BLD-SCNC: 137 MEQ/L (ref 135–145)
SPECIFIC GRAVITY, URINE: 1.01 (ref 1–1.03)
TOTAL PROTEIN: 5.8 G/DL (ref 6.1–8)
UROBILINOGEN, URINE: 1 EU/DL (ref 0–1)
WBC UA: ABNORMAL /HPF
YEAST: ABNORMAL

## 2021-12-24 PROCEDURE — APPSS45 APP SPLIT SHARED TIME 31-45 MINUTES: Performed by: PHYSICIAN ASSISTANT

## 2021-12-24 PROCEDURE — 6370000000 HC RX 637 (ALT 250 FOR IP): Performed by: PHYSICIAN ASSISTANT

## 2021-12-24 PROCEDURE — 1200000000 HC SEMI PRIVATE

## 2021-12-24 PROCEDURE — 97110 THERAPEUTIC EXERCISES: CPT

## 2021-12-24 PROCEDURE — 71045 X-RAY EXAM CHEST 1 VIEW: CPT

## 2021-12-24 PROCEDURE — 6360000002 HC RX W HCPCS: Performed by: PHYSICIAN ASSISTANT

## 2021-12-24 PROCEDURE — 81001 URINALYSIS AUTO W/SCOPE: CPT

## 2021-12-24 PROCEDURE — 99232 SBSQ HOSP IP/OBS MODERATE 35: CPT | Performed by: SURGERY

## 2021-12-24 PROCEDURE — 70450 CT HEAD/BRAIN W/O DYE: CPT

## 2021-12-24 PROCEDURE — 82948 REAGENT STRIP/BLOOD GLUCOSE: CPT

## 2021-12-24 PROCEDURE — 36415 COLL VENOUS BLD VENIPUNCTURE: CPT

## 2021-12-24 PROCEDURE — 97116 GAIT TRAINING THERAPY: CPT

## 2021-12-24 PROCEDURE — 97530 THERAPEUTIC ACTIVITIES: CPT

## 2021-12-24 PROCEDURE — 2580000003 HC RX 258: Performed by: PHYSICIAN ASSISTANT

## 2021-12-24 PROCEDURE — 6370000000 HC RX 637 (ALT 250 FOR IP): Performed by: INTERNAL MEDICINE

## 2021-12-24 PROCEDURE — 99232 SBSQ HOSP IP/OBS MODERATE 35: CPT | Performed by: INTERNAL MEDICINE

## 2021-12-24 PROCEDURE — 80053 COMPREHEN METABOLIC PANEL: CPT

## 2021-12-24 PROCEDURE — 93010 ELECTROCARDIOGRAM REPORT: CPT | Performed by: INTERNAL MEDICINE

## 2021-12-24 RX ORDER — MIDODRINE HYDROCHLORIDE 2.5 MG/1
2.5 TABLET ORAL
Status: DISCONTINUED | OUTPATIENT
Start: 2021-12-24 | End: 2021-12-31 | Stop reason: HOSPADM

## 2021-12-24 RX ORDER — TORSEMIDE 20 MG/1
20 TABLET ORAL DAILY
Status: DISCONTINUED | OUTPATIENT
Start: 2021-12-25 | End: 2021-12-31 | Stop reason: HOSPADM

## 2021-12-24 RX ORDER — ACETAMINOPHEN 325 MG/1
650 TABLET ORAL EVERY 4 HOURS PRN
Status: DISCONTINUED | OUTPATIENT
Start: 2021-12-24 | End: 2021-12-31 | Stop reason: HOSPADM

## 2021-12-24 RX ORDER — SENNA PLUS 8.6 MG/1
2 TABLET ORAL NIGHTLY
Status: DISCONTINUED | OUTPATIENT
Start: 2021-12-24 | End: 2021-12-27

## 2021-12-24 RX ORDER — POLYETHYLENE GLYCOL 3350 17 G/17G
17 POWDER, FOR SOLUTION ORAL 2 TIMES DAILY
Status: DISCONTINUED | OUTPATIENT
Start: 2021-12-24 | End: 2021-12-31 | Stop reason: HOSPADM

## 2021-12-24 RX ADMIN — ATORVASTATIN CALCIUM 40 MG: 40 TABLET, FILM COATED ORAL at 20:40

## 2021-12-24 RX ADMIN — CYCLOBENZAPRINE 10 MG: 10 TABLET, FILM COATED ORAL at 22:41

## 2021-12-24 RX ADMIN — INSULIN LISPRO 1 UNITS: 100 INJECTION, SOLUTION INTRAVENOUS; SUBCUTANEOUS at 22:21

## 2021-12-24 RX ADMIN — INSULIN LISPRO 1 UNITS: 100 INJECTION, SOLUTION INTRAVENOUS; SUBCUTANEOUS at 13:48

## 2021-12-24 RX ADMIN — CARVEDILOL 6.25 MG: 6.25 TABLET, FILM COATED ORAL at 08:48

## 2021-12-24 RX ADMIN — ENOXAPARIN SODIUM 40 MG: 100 INJECTION SUBCUTANEOUS at 13:48

## 2021-12-24 RX ADMIN — BENZOCAINE AND MENTHOL 1 LOZENGE: 15; 3.6 LOZENGE ORAL at 22:26

## 2021-12-24 RX ADMIN — SENNOSIDES 17.2 MG: 8.6 TABLET, COATED ORAL at 20:40

## 2021-12-24 RX ADMIN — ACETAMINOPHEN 650 MG: 325 TABLET ORAL at 22:29

## 2021-12-24 RX ADMIN — CARVEDILOL 6.25 MG: 6.25 TABLET, FILM COATED ORAL at 18:32

## 2021-12-24 RX ADMIN — MIDODRINE HYDROCHLORIDE 5 MG: 5 TABLET ORAL at 08:49

## 2021-12-24 RX ADMIN — LEVOTHYROXINE SODIUM 150 MCG: 0.15 TABLET ORAL at 08:49

## 2021-12-24 RX ADMIN — ASPIRIN 325 MG: 325 TABLET, COATED ORAL at 18:33

## 2021-12-24 RX ADMIN — FAMOTIDINE 20 MG: 20 TABLET ORAL at 08:49

## 2021-12-24 RX ADMIN — INSULIN GLARGINE 6 UNITS: 100 INJECTION, SOLUTION SUBCUTANEOUS at 20:41

## 2021-12-24 RX ADMIN — SODIUM CHLORIDE, PRESERVATIVE FREE 10 ML: 5 INJECTION INTRAVENOUS at 08:54

## 2021-12-24 RX ADMIN — HYDROCODONE BITARTRATE AND ACETAMINOPHEN 1 TABLET: 5; 325 TABLET ORAL at 01:40

## 2021-12-24 RX ADMIN — CYCLOBENZAPRINE 10 MG: 10 TABLET, FILM COATED ORAL at 00:54

## 2021-12-24 RX ADMIN — MIDODRINE HYDROCHLORIDE 5 MG: 5 TABLET ORAL at 13:50

## 2021-12-24 RX ADMIN — POLYETHYLENE GLYCOL 3350 17 G: 17 POWDER, FOR SOLUTION ORAL at 08:49

## 2021-12-24 ASSESSMENT — ENCOUNTER SYMPTOMS
ABDOMINAL PAIN: 0
SORE THROAT: 0
SHORTNESS OF BREATH: 0
VOMITING: 0
NAUSEA: 0
COUGH: 0
RHINORRHEA: 0

## 2021-12-24 ASSESSMENT — PAIN SCALES - GENERAL
PAINLEVEL_OUTOF10: 5
PAINLEVEL_OUTOF10: 6
PAINLEVEL_OUTOF10: 0

## 2021-12-24 NOTE — PROGRESS NOTES
Physical Therapy with patient states that patient seems to be behaving differently than before during her session with him. Called [facility] nurse to do a quick assessment of the patient. Code stroke called.

## 2021-12-24 NOTE — CONSULTS
Neurology Stroke Alert Note    Date:12/24/2021       UQTM:[id number]  Patient Bin Broderick     YOB: 1932     Age:89 y.o. Requesting Physician: Stacy Ang MD     Reason for Consult:  Evaluate for stroke alert    HPI: Leela Rubio who is a 80 y.o. male with a history of arthritis, CHF, hypertension, thyroid disease, diabetes, and dementia who is currently admitted for a fall with a thoracic fracture status post kyphoplasty. Stroke alert activation was called today at approximately 3:20 PM due to strokelike symptoms including left-sided facial droop, confusion, and some ataxia. The patient's initial NIHSS was 5, but upon reassessment with , the patient's NIH stroke scale was 0. As such, TPA was not indicated. Additionally, it was noted that the patient asterixis on exam, which is more likely metabolic in nature and vascular. A stat CT of the head was obtained which was negative for any acute findings. CT angiograms were deferred due to the patient's low stroke scale and exam findings. Upon further interview, the patient is a poor historian and is unable to provide a reliable history. He displays poor attention and concentration. He denies any acute complaints. His son who is present at bedside, does state the patient seems to have some dementia, but he has no history of prior stroke. Time of symptoms onset/last time seen at baseline: 1:50 PM.  Time of stroke alert: 3:18 PM.  Time of Neurology arrival: 3:20 PM.  Vascular risk factors: Diabetes, history of smoking. Initial Glucose: 119. Old deficits from prior stroke: None. INR in ED if anticoagulated: Not applicable. BP in ED: 149/73. Initial NIHSS: 0.  Modified [place] Score upon admission: 0.   IV tPA administerd: Not indicated. Time of Initial Imaging Read: 4:09 PM.  Thrombectomy performed: Not indicated. Puncture time: Not indicated.          Review of Systems   Review of Systems   Constitutional: Negative for chills and fever. HENT: Negative for rhinorrhea and sore throat. Eyes: Negative for visual disturbance. Respiratory: Negative for cough and shortness of breath. Cardiovascular: Negative for chest pain and palpitations. Gastrointestinal: Negative for abdominal pain, nausea and vomiting. Genitourinary: Negative for dysuria. Musculoskeletal: Negative for arthralgias and myalgias. Skin: Negative for rash. Neurological: Positive for facial asymmetry (resolved). Negative for dizziness, weakness, numbness and headaches. Psychiatric/Behavioral: Positive for confusion. The patient is not nervous/anxious.         Medications   Scheduled Meds:    enoxaparin  40 mg SubCUTAneous Daily    polyethylene glycol  17 g Oral BID    senna  2 tablet Oral Nightly    [START ON 12/25/2021] torsemide  20 mg Oral Daily    midodrine  2.5 mg Oral TID WC    aspirin  325 mg Oral Daily    sodium chloride flush  5-40 mL IntraVENous 2 times per day    [Held by provider] glipiZIDE  5 mg Oral QAM AC    [Held by provider] alogliptin  12.5 mg Oral Daily    carvedilol  6.25 mg Oral BID WC    atorvastatin  40 mg Oral Nightly    levothyroxine  150 mcg Oral Daily    insulin glargine  6 Units SubCUTAneous Nightly    lidocaine  3 patch Topical Daily    famotidine  20 mg Oral Every Other Day    insulin lispro  0-6 Units SubCUTAneous TID WC    insulin lispro  0-3 Units SubCUTAneous Nightly     Continuous Infusions:    sodium chloride      dextrose       PRN Meds: morphine **OR** [DISCONTINUED] morphine, sodium chloride flush, sodium chloride, ondansetron **OR** ondansetron, HYDROmorphone, benzocaine-menthol, fleet, cyclobenzaprine, HYDROcodone 5 mg - acetaminophen **OR** HYDROcodone 5 mg - acetaminophen, glucose, dextrose, glucagon (rDNA), dextrose    Past History    Past Medical History:   has a past medical history of Arthritis, CHF (congestive heart failure) ([address] Constipation, Hypertension, SunDown syndrome, Thyroid disease, and Type II or unspecified type diabetes mellitus without mention of complication, not stated as uncontrolled. Social History:   reports that he quit smoking about 38 years ago. He has never used smokeless tobacco. He reports that he does not drink alcohol and does not use drugs. Family History:   Family History   Problem Relation Age of Onset    Stroke Mother     Arthritis Father     Heart Disease Father        Physical Examination        NIH Stroke Scale  1a Level of consciousness 0=alert; keenly responsive   1b LOC questions 0=Performs both tasks correctly   1c LOC commands 0=Performs both tasks correctly   2 Best Gaze 0=normal   3 Visual 0=No visual loss   4 Facial Palsy 0=Normal symmetric movement   5a Motor left arm 0=No drift, limb holds 90 (or 45) degrees for full 10 seconds   5b Motor right arm 0=No drift, limb holds 90 (or 45) degrees for full 10 seconds   6a Motor left leg 0=No drift, limb holds 90 (or 45) degrees for full 10 seconds   6b Motor right leg 0=No drift, limb holds 90 (or 45) degrees for full 10 seconds   7 Limb Ataxia 0=Absent   8 Sensory 0=Normal; no sensory loss   9 Best Language 0=No aphasia, normal   10 Dysarthria 0=Normal   11 Extinction and Inattention 0=No abnormality   TOTAL  0   Time NIHSS performed: 6:26 PM    Vitals:  BP (!) 149/73   Pulse 89   Temp 98.2 °F (36.8 °C) (Oral)   Resp 16   Ht 6' 1\" (1.854 m)   Wt 201 lb 14.4 oz (91.6 kg)   SpO2 96%   BMI 26.64 kg/m²   Temp (24hrs), Av.1 °F (36.7 °C), Min:98.1 °F (36.7 °C), Max:98.2 °F (36.8 °C)      I/O (24Hr): Intake/Output Summary (Last 24 hours) at 2021 1826  Last data filed at 2021 1810  Gross per 24 hour   Intake 1141 ml   Output 900 ml   Net 241 ml       Physical Exam  Vitals reviewed. Constitutional:       General: He is not in acute distress. Appearance: Normal appearance. He is not ill-appearing. HENT:      Head: Normocephalic and atraumatic.       Right Ear: External ear normal.      Left Ear: External ear normal.      Nose: Nose normal.      Mouth/Throat:      Mouth: Mucous membranes are moist.      Pharynx: No oropharyngeal exudate or posterior oropharyngeal erythema. Eyes:      Extraocular Movements: EOM normal.      Pupils: Pupils are equal, round, and reactive to light. Cardiovascular:      Rate and Rhythm: Normal rate and regular rhythm. Heart sounds: Normal heart sounds. No murmur heard. Pulmonary:      Effort: Pulmonary effort is normal. No respiratory distress. Breath sounds: Normal breath sounds. No wheezing. Abdominal:      General: Bowel sounds are normal.      Palpations: Abdomen is soft. Tenderness: There is no abdominal tenderness. Musculoskeletal:         General: Normal range of motion. Right lower leg: No edema. Left lower leg: No edema. Skin:     General: Skin is warm. Findings: No rash. Neurological:      Mental Status: He is alert and oriented to person, place, and time. Coordination: Finger-Nose-Finger Test and Heel to [facility] Test normal.      Deep Tendon Reflexes:      Reflex Scores:       Patellar reflexes are 2+ on the right side and 2+ on the left side. Psychiatric:         Mood and Affect: Mood normal.         Speech: Speech normal.         Behavior: Behavior normal.       Neurologic Exam     Mental Status   Oriented to person, place, and time. Attention: decreased. Concentration: decreased. Speech: speech is normal   Level of consciousness: alert  Normal comprehension. Patient is alert and oriented x4, but displays decreased attention and concentration. Is able to answer questions, but displays tangential thought processes. Cranial Nerves     CN II   Visual fields full to confrontation. Right visual field deficit: none  Left visual field deficit: none     CN III, IV, VI   Pupils are equal, round, and reactive to light. Extraocular motions are normal.   Right pupil: Shape: regular. Reactivity: brisk. Left pupil: Shape: regular. Reactivity: brisk. CN V   Facial sensation intact. Right facial sensation deficit: none  Left facial sensation deficit: none    CN VII   Facial expression full, symmetric. Right facial weakness: none  Left facial weakness: none    CN VIII   CN VIII normal.   Hearing: intact    CN IX, X   CN IX normal.   CN X normal.   Palate: symmetric    CN XI   CN XI normal.   Right trapezius strength: normal  Left trapezius strength: normal    CN XII   CN XII normal.   Tongue: not atrophic  Fasciculations: absent  Tongue deviation: none    Motor Exam   Muscle bulk: normal  Overall muscle tone: normal  Right arm tone: normal  Left arm tone: normal  Right arm pronator drift: absent  Left arm pronator drift: absent  Right leg tone: normal  Left leg tone: normal  Muscle strength 5/5 in bilateral upper and lower extremities     Sensory Exam   Light touch normal.     Gait, Coordination, and Reflexes     Coordination   Finger to nose coordination: normal  Heel to shin coordination: normal    Reflexes   Right patellar: 2+  Left patellar: 2+       Labs/Imaging/Diagnostics   Labs:  CBC:  Recent Labs     12/22/21 0719 12/23/21 0516   WBC 6.3 6.0   RBC 3.93* 3.60*   HGB 12.2* 11.2*   HCT 39.6* 36.3*   .8* 100.8*    206     CHEMISTRIES:  Recent Labs     12/22/21 0719 12/22/21 0719 12/23/21 0516 12/23/21 1952 12/24/21  0713   *  --  137  --  137   K 5.7*   < > 5.5* 4.6 4.4     --  102  --  103   CO2 19*  --  19*  --  25   BUN 42*  --  43*  --  30*   CREATININE 1.7*  --  1.7*  --  1.4*   GLUCOSE 178*  --  179*  --  113*    < > = values in this interval not displayed. PT/INR:No results for input(s): PROTIME, INR in the last 72 hours. APTT:No results for input(s): APTT in the last 72 hours.   LIVER PROFILE:  Recent Labs     12/24/21  0713   AST 17   ALT 9*   BILITOT 0.5   ALKPHOS 117       Imaging Last 24 Hours:  CT HEAD WO CONTRAST    Result Date: 12/24/2021  PROCEDURE: CT HEAD WO CONTRAST CLINICAL INFORMATION: Code stroke COMPARISON: No prior study. TECHNIQUE:  Axial CT images were obtained through the head without contrast. Coronal and sagittal reformatted images were rendered. All CT scans at this facility use dose modulation, iterative reconstruction, and/or weight-based dosing when appropriate to reduce radiation dose to as low as reasonably achievable. FINDINGS: No acute intracranial hemorrhage or mass effect is seen. There is moderate diffuse atrophy. There is no midline shift. The basal cisterns and posterior fossa appear within normal limits. No acute abnormalities of the calvarium are seen. Parasellar carotid artery vascular calcifications are seen. There is mild mucosal thickening within the right maxillary sinus. Mild patchy periventricular white matter hypoattenuation is demonstrated likely representing sequela from mild chronic small vessel ischemic disease. 1. No acute intracranial hemorrhage or mass effect is seen. This was discussed with the referring clinician at the time of dictation. 2. There is moderate diffuse atrophy. **This report has been created using voice recognition software. It may contain minor errors which are inherent in voice recognition technology. ** Final report electronically signed by Dr. Rosa Link on 12/24/2021 4:09 PM    XR CHEST 1 VIEW    Result Date: 12/24/2021  PROCEDURE: XR CHEST 1 VIEW CLINICAL INFORMATION: Stroke TECHNIQUE: AP supine chest radiograph COMPARISON: Mobile AP chest radiograph 12/18/2021 FINDINGS: A left-sided cardiac device is in stable position. There is mild stable enlargement of the cardiac silhouette. Atherosclerotic calcifications are present in the aorta. Minimal reticular opacities are seen at the left lung base. Degenerative and vertebroplasty changes in the thoracic spine are poorly visualized. Surgical clips are present in the upper abdomen.      1. Minimal left basilar atelectasis/infiltrate. 2. Mild stable cardiomegaly. Final report electronically signed by Dr. Alyson Villatoro on 12/24/2021 4:15 PM        Assessment and Plan:          1. Altered mental status, likely toxic metabolic encephalopathy. Stroke alert. · CT head negative for acute findings  · Start aspirin 325 mg daily  · Continue Lipitor 40 mg.  · Recommend laboratory work-up including work-up for infectious processes and metabolic disturbances including hepatic and renal pathologies  · We will defer MRI at this time  · Neurology will continue to follow    This patient was seen and evaluated with Dr. Yaritza Treviño and he is in agreement with the assessment and plan. Electronically signed by Mikala Sandra PA-C on 12/24/21 at 6:39 PM EST      Electronically signed by Mikala Sandra PA-C on 12/24/21 at 6:26 PM EST    I had a face-to-face encounter with this patient today where I evaluated the patient with Mikala Sandra PAC. The history and physical examination was completed by me as documented in the attached note. All relevant radiology images, labs and vitals signs were reviewed by me. I agree with the physical examination, assessment, and plan as documented which was formulated by me. The timing of this note filing does not necessarily correlate with the timing of when the patient was seen by me. 55-year-old man with a history of dementia, hypertension, diabetes, admitted after fall with thoracic fracture status post kyphoplasty. He was noted to have left-sided facial droop, confusion, and some possible ataxia as such a stroke alert was activated. Per history, his initial NIH stroke scale was 5, upon reassessment by me his NIH stroke scale was 0. He was not a TPA candidate due to the low NIH stroke scale and recent kyphoplasty. He did have asterixis on clinical examination which is strongly correlated with a toxic metabolic encephalopathy.   Stat CT of the head was negative for acute hemorrhage, the CT angiogram was deferred due to his low NIH stroke scale. Will start patient on aspirin daily, continue Lipitor, would recommend work-up for toxic metabolic encephalopathy including infectious rule out, electrolyte balancing, and evaluation for liver and kidney disease. We will plan on deferring an MRI at this time as patient's neurologic examination is improved, if he continues to wax and wane we will obtain routine EEG to rule out seizures. Other recommendations as above.       Jake Post MD  Vascular and Interventional Neurology, Neurocritical Care  [address] [place] [address]  [phone]

## 2021-12-24 NOTE — PROGRESS NOTES
[facility]  Occupational Therapy  Daily Note  Time:   Time In: 6203  Time Out: 1525  Timed Code Treatment Minutes: 43 Minutes  Minutes: 43          Date: 2021  Patient Name: Katja Brown,   Gender: male      Room: 8E70/A  MRN: [id number]  : 10/25/1932  (80 y.o.)  Referring Practitioner: Lisa Arias PA-C  Diagnosis: Fall at [facility]  Additional Pertinent Hx: Salome Flores from standing position a couple days ago at the time EMS was called and they assisted him off the floor. Yesterday his pain was getting worse and therefore went to his local hospital at [facility]. There he was found to have fractures of his thoracic spine and was transferred to us for further evaluation. He does not take any blood thinners. He complains of pain in his neck and back. He had a negative CT cervical spine at the outside hospital and received a dose of fentanyl prior to transfer. Pt is S/P kyphoplasty at T8 on 21. Restrictions/Precautions:  Restrictions/Precautions: Fall Risk,General Precautions  Position Activity Restriction  Other position/activity restrictions: avoid excessive trunk flexion     SUBJECTIVE: RN approved OT session. Upon entering room pt seated in recliner chair, agreeable to OT session. Pleasantly confused. Daughter present. PAIN: mild back pain     Vitals: Vitals not assessed per clinical judgement, see nursing flowsheet    COGNITION: Slow Processing, Decreased Recall and Decreased Insight    ADL:   No ADL's completed this session. Iftikhar Rocha BALANCE:  Sitting Balance:  Contact Guard Assistance. Seated EOB    BED MOBILITY:  Rolling to Left: Moderate Assistance Due to back pain  Rolling to Right: Moderate Assistance due to back pain  Sit to Supine: Maximum Assistance due to decreased endurance    TRANSFERS:  Sit to Stand:  Minimal Assistance. Standing from recliner chair   Stand to Sit: Minimal Assistance.  sitting onto EOB       ASSESSMENT:    Pt completed ALEXA exercises to increase UB strength and endurance to increase indep in toileting and bathing. Pt completed B UE exs with light resistance band x 10 reps, x 1 set seated, while following one step verbal and visual commands. Fair tolerance of exs, with  0 RBs throughout, and maxcues for tech with resistance band. During exercises patient begins to become confused, unable to correctly answer questions. Nursing made aware and began neuro assessment. Code stroke called at this time. Pt left in care of nursing. Activity Tolerance:  Patient tolerance of  treatment: fair. During       Discharge Recommendations: Continue to assess pending progress  Equipment Recommendations: Other: Will continue to monitor  Plan: Times per week: 6x  Specific instructions for Next Treatment: Functional mobility; ADLs and standing balance; UE exercises and back protection; safety awareness  Current Treatment Recommendations: Strengthening,Balance Training,Functional Mobility Training,Endurance Training,Safety Education & Training,Self-Care / ADL  Plan Comment: Pt would benefit from continued skilled OT services when medically stable and discharged from Acute. Dusty Israel is recommended. Patient Education  Patient Education: Role of OT, Plan of Care and Energy Conservation    Goals  Short term goals  Time Frame for Short term goals: By discharge  Short term goal 1: Pt will demonstrate functional mobility walking to/from the bathroom while using a rolling walker with no > than CGA to prepare for doing sinkside ADLs. Short term goal 2: Pt will complete grooming or dressing while standing and using 1 hand release with CGA to increase his balance and safety while doing his self care. Short term goal 3: Pt will complete transfers to/from an elevated toilet seat with armrests or a bedside commode with SBA to increase his independence with toileting routine.   Short term goal 4: Pt will complete BUE ROM/moderate resistance exercises while following back protection techniques with demonstration to increase his endurance and strength for ease of doing ADLs and functional mobility. Short term goal 5: Pt will complete a spongebath or shower with no >than MIN A and verbal cues for safety to increase his independence with self care. Following session, patient left in safe position with all fall risk precautions in place.

## 2021-12-24 NOTE — PROGRESS NOTES
Hospitalist Progress Note      Patient:  Oanh Dk    Unit/Bed:8E-70/8E-70A  YOB: 1932  MRN: [id number]   Acct: [de-identified]   PCP: MESSI Henriquez  Date of Admission: 12/18/2021    Assessment/Plan:    1. Fall with T8 fx: s/p kyphoplasty on 12/21 12/24/21: continue therapy as planned  2. Hyperkalemia: Pts home torsemide was held, given kayexelate with improvement  12/24/21: 4.4 today, recheck tomorrow  3. CKD: unclear baseline at this time, but was 2.1 in 2013 12/24/21: currently 1.4, stop IVF and resume torsemide (once daily instead of twice a day for now and monitor)  4. Constipation: longstanding issue per family  12/24/21: family and pt can't recall any BM during this hospitalization, none documented, already has prn ordered (but not given) so will increase miralax to BID and add nightly senna  5. Essential HTN: wean down midodrine to 2.5mg on 12/24/21 and monitor BP  6. AFib: on Coreg, not on home amiodarone, continue to monitor HR  7. Acute metabolic encephalopathy: mild, stroke alert called shortly after patient was seen due to facial droop; droop resolved during examination by neurologist, Dr. Jim Blair, exam otherwise consistent with toxic - possibly renal or hepatic etiology. Pt with CKD, LFTs previously good, will confirm NH3 in AM is normal, family cites mentation is slightly worse than typical, but he is tangential at baseline. Expected discharge date:  ? Disposition:    [] Home       [] TCU       [x] Rehab       [] Psych       [x] SNF       [] [place]       [] Other-    Chief Complaint: [facility] Treatment Course: (Prior to today) \"He is a 80 y. o. male who continues to present at [facility]. Christina's on 5K following a fall. Patient states tolerating kyphoplasty, no significant pain.  Patient denies chest pain, shortness of breath, cough, headache, dizziness, lightheadedness, numbness, paraesthesias, weakness, chills, fevers, abdominal pain, nausea, vomiting,neck pain, or back pain.  Nursing staff states patient hyperkalemic at 5.5, Kayexalate ordered. Vital signs stable on exam. Care in coordination with trauma surgeon Dr. Heladio Saleem. \"    12/24/21: work on treating constipation, stop IVF     Subjective (past 24 hours): Difficult to obtain clear history as pt tangential, complaining of his neck pain when asked how his back was doing. Overall pt does seem to indicate doing well. Pt and family are not sure when last BM occurred. Past medical history, family history, social history and allergies reviewed again and is unchanged since admission. ROS (12 point review of systems completed. Pertinent positives noted.  Otherwise ROS is negative)     Medications:  Reviewed    Infusion Medications    sodium chloride      dextrose       Scheduled Medications    enoxaparin  40 mg SubCUTAneous Daily    polyethylene glycol  17 g Oral BID    senna  2 tablet Oral Nightly    sodium chloride flush  5-40 mL IntraVENous 2 times per day    [Held by provider] glipiZIDE  5 mg Oral QAM AC    [Held by provider] alogliptin  12.5 mg Oral Daily    carvedilol  6.25 mg Oral BID WC    atorvastatin  40 mg Oral Nightly    levothyroxine  150 mcg Oral Daily    [Held by provider] torsemide  20 mg Oral BID    midodrine  5 mg Oral TID WC    insulin glargine  6 Units SubCUTAneous Nightly    lidocaine  3 patch Topical Daily    famotidine  20 mg Oral Every Other Day    insulin lispro  0-6 Units SubCUTAneous TID WC    insulin lispro  0-3 Units SubCUTAneous Nightly     PRN Meds: morphine **OR** [DISCONTINUED] morphine, sodium chloride flush, sodium chloride, ondansetron **OR** ondansetron, HYDROmorphone, benzocaine-menthol, fleet, cyclobenzaprine, HYDROcodone 5 mg - acetaminophen **OR** HYDROcodone 5 mg - acetaminophen, glucose, dextrose, glucagon (rDNA), dextrose      Intake/Output Summary (Last 24 hours) at 12/24/2021 [address] [place] [address] filed at 12/24/2021 1348  Gross per 24 hour   Intake 680 ml   Output 900 ml   Net -220 ml       Diet:  ADULT DIET; Regular; 4 carb choices (60 gm/meal)    Exam:  /78   Pulse 81   Temp 98.1 °F (36.7 °C) (Oral)   Resp 20   Ht 6' 1\" (1.854 m)   Wt 201 lb 14.4 oz (91.6 kg)   SpO2 96%   BMI 26.64 kg/m²   General appearance:  No apparent distress, appears stated age and cooperative. HEENT: Pupils equal, round, and reactive to light. Conjunctivae/corneas clear. Neck: Supple, with full range of motion. No jugular venous distention. Trachea midline. Respiratory:  Normal respiratory effort. Clear to auscultation, bilaterally without Rales/Wheezes/Rhonchi. Cardiovascular: Regular rate and rhythm with normal S1/S2 without murmurs, rubs or gallops. Abdomen: Soft, non-tender, non-distended with normal bowel sounds. Musculoskeletal: passive and active ROM x 4 extremities. Skin: Skin color, texture, turgor normal.  No rashes or lesions. Neurologic:  Neurovascularly intact without any focal sensory/motor deficits. Cranial nerves: II-XII intact, grossly non-focal.  Psychiatric: Alert and oriented, thought content tangential at times  Capillary Refill: Brisk,< 3 seconds   Peripheral Pulses: +2 palpable, equal bilaterally     Labs:   Recent Labs     12/22/21 0719 12/23/21  0516   WBC 6.3 6.0   HGB 12.2* 11.2*   HCT 39.6* 36.3*    206     Recent Labs     12/22/21  0719 12/22/21  0719 12/23/21  0516 12/23/21 1952 12/24/21  0713   *  --  137  --  137   K 5.7*   < > 5.5* 4.6 4.4     --  102  --  103   CO2 19*  --  19*  --  25   BUN 42*  --  43*  --  30*   CREATININE 1.7*  --  1.7*  --  1.4*   CALCIUM 8.6  --  8.5  --  8.5    < > = values in this interval not displayed. Recent Labs     12/24/21  0713   AST 17   ALT 9*   BILITOT 0.5   ALKPHOS 117     No results for input(s): INR in the last 72 hours.   No results for input(s): Nicolasa Garcia in the last 72 hours.    Microbiology:    Blood culture #1: No results found for: BC    Blood culture #2:No results found for: Yesi Busfrandy    Organism:No results found for: ORG    No results found for: LABGRAM    MRSA culture only:No results found for: [facility]    Urine culture: No results found for: LABURIN    Respiratory culture: No results found for: CULTRESP    Aerobic and Anaerobic :  No results found for: LABAERO  No results found for: LABANAE    Urinalysis:      Lab Results   Component Value Date    NITRU NEGATIVE 12/18/2021    WBCUA 0-2 12/18/2021    BACTERIA NONE SEEN 12/18/2021    RBCUA NONE SEEN 12/18/2021    BLOODU NEGATIVE 12/18/2021    GLUCOSEU NEGATIVE 12/18/2021       Radiology:  FLUORO FOR SURGICAL PROCEDURES   Final Result      XR THORACIC SPINE (3 VIEWS)   Final Result   FINDINGS/IMPRESSION:    Saved fluoroscopic images show instrumentation of the thoracic vertebral body through the bilateral pedicles with injection of radiopaque material within the vertebral body in keeping with history of kyphoplasty. Please refer to the operative report for    further formation. **This report has been created using voice recognition software. It may contain minor errors which are inherent in voice recognition technology. **      Final report electronically signed by Dr. Matias Burrell MD on 12/21/2021 7:05 PM      CT INTERPRETATION OF OUTSIDE IMAGES   Final Result   Acute , oblique nondisplaced fracture through the mid and inferior aspects    of the T8 vertebral body. No significant height loss or retropulsion. Additional acute, oblique fracture through the T8 spinous process. Disruption of anterior longitudinal ligament calcification at the T8 level    and therefore ligamentous disruption cannot be excluded. This document has been electronically signed by:  Thomas Chicas MD on    12/18/2021 04:17 AM      All CTs at this facility use dose modulation techniques and iterative    reconstructions, and/or weight-based dosing   when appropriate to reduce radiation to a low as reasonably achievable. CT INTERPRETATION OF OUTSIDE IMAGES   Final Result   No acute process            **This report has been created using voice recognition software. It may contain minor errors which are inherent in voice recognition technology. **      Final report electronically signed by Dr. Cristo Gomes on 12/18/2021 1:00 AM      CT INTERPRETATION OF OUTSIDE IMAGES   Final Result   No acute process            **This report has been created using voice recognition software. It may contain minor errors which are inherent in voice recognition technology. **      Final report electronically signed by Dr. Cristo Gomes on 12/18/2021 12:56 AM      CT INTERPRETATION OF OUTSIDE IMAGES   Final Result   No acute process            **This report has been created using voice recognition software. It may contain minor errors which are inherent in voice recognition technology. **      Final report electronically signed by Dr. Cristo Gomes on 12/18/2021 12:59 AM      XR CHEST PORTABLE   Final Result   No acute disease            **This report has been created using voice recognition software. It may contain minor errors which are inherent in voice recognition technology. **      Final report electronically signed by Dr. Cristo Gomes on 12/18/2021 12:42 AM        XR CHEST PORTABLE    Result Date: 12/18/2021  PROCEDURE: XR CHEST PORTABLE CLINICAL INFORMATION: Trauma . TECHNIQUE: Portable semiupright COMPARISON: No prior study. FINDINGS: Heart and mediastinal and hilar contours are within normal limits. No infiltrates or effusions. Vessels are not congested left-sided AICD. EKG leads overlie the chest.     No acute disease **This report has been created using voice recognition software. It may contain minor errors which are inherent in voice recognition technology. ** Final report electronically signed by Dr. Cristo Gomes on 12/18/2021 12:42 AM    CT INTERPRETATION OF OUTSIDE IMAGES    Result Date: 12/18/2021  ** ADDENDUM #1 ** Receipt of this report by the clinical staff was confirmed with Reny Dozier RN on Dec 18, 2021 04:29:00 EST. This document has been electronically signed by: Lizzie Dominguez on 12/18/2021 04:30 AM ** ORIGINAL REPORT ** CT thoracic spine without IV contrast. Comparison: None Findings: Normal alignment. Osteopenia. Acute oblique nondisplaced fracture through the mid and inferior aspects of the T8 vertebral body. No significant height loss or retropulsion. Additional acute, oblique fracture through the T8 spinous process. Disruption of anterior longitudinal ligament calcification at the T8 level and therefore ligamentous disruption cannot be excluded. Multilevel degenerative disc space narrowing and hypertrophic spurring. Prominent ligamentous calcifications. No high grade canal stenosis noted. No paraspinal soft tissue hematoma. Visualized lung fields without acute pathology. Moderate cardiomegaly. Dense atheromatous coronary vascular calcifications. Acute , oblique nondisplaced fracture through the mid and inferior aspects of the T8 vertebral body. No significant height loss or retropulsion. Additional acute, oblique fracture through the T8 spinous process. Disruption of anterior longitudinal ligament calcification at the T8 level and therefore ligamentous disruption cannot be excluded. This document has been electronically signed by: Brianna Paris MD on 12/18/2021 04:17 AM All CTs at this facility use dose modulation techniques and iterative reconstructions, and/or weight-based dosing when appropriate to reduce radiation to a low as reasonably achievable. CT INTERPRETATION OF OUTSIDE IMAGES    Result Date: 12/18/2021  PROCEDURE: CT INTERPRETATION OF OUTSIDE IMAGES CLINICAL INFORMATION: trauma transfer .  TECHNIQUE: 2-D multiplanar reconstructed images of the lumbar spine All CT scans at this facility use dose modulation, iterative reconstruction, and/or weight-based dosing when appropriate to reduce radiation dose to as low as reasonably achievable. COMPARISON: No prior study. FINDINGS: No acute fracture or acute alignment malalignment. Severe degenerative changes of the disks and marginal osteophytes. Bones are osteopenic. Incidental note is made of a distention of the urinary bladder partially imaged. No acute process **This report has been created using voice recognition software. It may contain minor errors which are inherent in voice recognition technology. ** Final report electronically signed by Dr. Ryamond Goes on 12/18/2021 1:00 AM    CT INTERPRETATION OF OUTSIDE IMAGES    Result Date: 12/18/2021  PROCEDURE: CT INTERPRETATION OF OUTSIDE IMAGES CLINICAL INFORMATION: trauma transfer . TECHNIQUE: 2-D multiplanar noncontrast images of the cervical spine done at [facility]). All CT scans at this facility use dose modulation, iterative reconstruction, and/or weight-based dosing when appropriate to reduce radiation dose to as low as reasonably achievable. COMPARISON: No prior study. FINDINGS: No acute fracture or acute bony malalignment. Severe degenerative changes throughout. Osteopenia. No precervical soft tissue swelling. No acute process **This report has been created using voice recognition software. It may contain minor errors which are inherent in voice recognition technology. ** Final report electronically signed by Dr. Raymond Goes on 12/18/2021 12:59 AM    CT INTERPRETATION OF OUTSIDE IMAGES    Result Date: 12/18/2021  PROCEDURE: CT INTERPRETATION OF OUTSIDE IMAGES CLINICAL INFORMATION: trauma transfer  . TECHNIQUE: 2-D multiplanar noncontrast images of the brain done at [facility]). All CT scans at this facility use dose modulation, iterative reconstruction, and/or weight-based dosing when appropriate to reduce radiation dose to as low as reasonably achievable. COMPARISON: No prior study.  FINDINGS: Marked generalized cerebral volume loss. Ventricular megaly consistent with volume loss. No evidence of acute hemorrhage. No acute infarct seen. Calvarium is intact. Small air-fluid level in the right maxillary sinus postsurgical changes of the sinus. Radiopaque foreign body posterior to the right maxillary sinus postsurgical. Other visualized paranasal sinuses and mastoid is clear. No acute process **This report has been created using voice recognition software. It may contain minor errors which are inherent in voice recognition technology. ** Final report electronically signed by Dr. Prudence Salvador on 12/18/2021 12:56 AM      DVT prophylaxis: [x] Lovenox                                 [] SCDs                                 [] SQ Heparin                                 [] Encourage ambulation           [] Already on Anticoagulation     Code Status: Full Code    Tele:   [] yes             [x] no    Electronically signed by Abad Briggs DO on 12/24/2021 at 2:25 PM

## 2021-12-24 NOTE — PROGRESS NOTES
[address] [address]  INPATIENT PHYSICAL THERAPY  DAILY NOTE  STRZ SURGE OVERFLOW - 8E-70/8E-70A      Time In:   Time Out: 06  Timed Code Treatment Minutes: 24 Minutes  Minutes: 24          Date: 2021  Patient Name: Yas Gomez,  Gender:  male        MRN: [id number]  : 10/25/1932  (80 y.o.)     Referring Practitioner: Lam Hayes PA-C  Diagnosis: fall at home  Additional Pertinent Hx: Per EMR Cecil Elizabeth is an 80year old male presenting to the Emergency Department as a transfer in from [facility] for evaluation and treatment of a T8 vertebral body fracture from a fall sustained 3 days prior. He reports that he was experiencing increasing back pain so he went to the outlying ER for evaluation where they found a fracture of the T8 vertebral body. He denies hitting his head and denies loss of consciousness. He denies any numbness or tingling, no loss of bowel or bladder control. Only complaint is neck and back pain. \" Pt is s/p Kyphoplasty and cement augmentation for acute osteoporotic ( age related) compression fracture at the level T8 completed by Dr. Tegan Duffy on . Prior Level of Function:  Lives With: Spouse  Type of Home: House  Home Layout: One level  Home Access: Level entry (threshhold is 1 or 2 inches)  Home Equipment: Rolling walker,Cane   Bathroom Shower/Tub: Walk-in shower,Shower chair with back  Bathroom Toilet: Standard  Bathroom Accessibility: Accessible    Receives Help From: Family  ADL Assistance: Needs assistance  Homemaking Assistance: Needs assistance  Homemaking Responsibilities: No  Ambulation Assistance: Independent  Transfer Assistance: Independent  Active : No  Additional Comments: Pt had been using a rolling walker at home. He does his own showering but has help with dressing. He has had help from spouse with managing medications.   Per family, pt frequently loses his hearing aides and had to have help to locate them.    Restrictions/Precautions:  Restrictions/Precautions: Fall Risk,General Precautions  Position Activity Restriction  Other position/activity restrictions: avoid excessive trunk flexion       SUBJECTIVE: nursing okd therapy- pts son at bedside and supportive- pt agreeable for therapy he was a little confused but easily redirected - pt [place]     PAIN: pts main c/o of pain was neck pain his son reported that this was normal- one occ he c/o of a muscle spasm when he was going to sit down but struggled to be able to state where     Vitals: Oxygen: pt on room air- he was SOB following walk but O2 sats were 98% and HR was 98    OBJECTIVE:  Bed Mobility:  Not Tested    Transfers:  Sit to Stand: Moderate Assistance,  x 3 trials from chair with cues for hand placement and he needed assist for wt shifting forward   Stand to [address], cues to reach back and to control sit- pt did c/o of muscle spasm one time as he was sitting down     Ambulation:  Minimal Assistance  Distance: 18x1  Surface: Level Tile  Device:Rolling Walker  Gait Deviations:  Cues to slow down and to work on posture- tactile cues given at trunk and cues to hold head up- he also required assist to guide the walker to avoid bumping into objects, noted decreased step length and decreased heel strike and lacking TKE at shaheed LEs required several verbal and tactile cues for safety     Balance:  static standing at walker with CGA working on posture and holding head up    Exercise:  Patient was guided in 1 set(s) 10-12 reps of exercise to both lower extremities. Ankle pumps, Quad sets, Seated marches, Seated heel/toe raises, Long arc quads and stretched shaheed heelcords 3x 30 sec . Exercises were completed for increased independence with functional mobility. Functional Outcome Measures:  Other complete   AM-PAC Inpatient Mobility Raw Score : 16  AM-PAC Inpatient T-Scale Score : 40.78    ASSESSMENT:  Assessment: Pt demonstrated generalized weakness and decreased strength, balance and endurance. Pt would greatly benefit from cont skilled therapy   Activity Tolerance:  Patient tolerance of  treatment: fair. Equipment Recommendations:Equipment Needed: No  Discharge Recommendations: Inpatient Rehabilitation  Plan: Times per week: 6x O/T  Current Treatment Recommendations: Strengthening,Neuromuscular Re-education,Home Exercise Program,Safety Education & Lorelie Budge Training,Patient/Caregiver Education & Training,Functional Mobility Training,Equipment Evaluation, Education, & procurement,Transfer Training,Gait Training,Stair training    Patient Education  Patient Education: Plan of Care, Transfers    Goals:  Patient goals : intpatient rehab and then return home  Short term goals  Time Frame for Short term goals: by discharge  Short term goal 1: Pt will amb with RW with S for 50 feet to [facility] with [facility] distances. Short term goal 2: Pt will demo S for transfers with good safety and RW for support to progress towards PLOF. Short term goal 3: Pt will demo IND with bed mobility tasks with bed flat to progress towards PLOF safely. Short term goal 4: Pt will negotiate a step for small threshold into home with SBA for safety to progress with mobility. Long term goals  Time Frame for Long term goals : NA due to short ELOS    Following session, patient left in safe position with all fall risk precautions in place.

## 2021-12-24 NOTE — PROGRESS NOTES
[facility] - Dr. Kevin Flores covering Dr. Eh Scott  Daily Progress Note  Pt Name: Liset Gill Record Number: [id number]  Date of Birth 10/25/1932   Today's Date: 12/24/2021    HD: # 6    CC: \"good\"    ASSESSMENT  1. Active Hospital Problems    Diagnosis Date Noted    Fall at home [[address]. Johann Jcel, G08.358] 12/18/2021    Closed fracture of eighth thoracic vertebra ([address] Dumont Milling 12/18/2021         PLAN  Admit to Trauma Services     Trauma by fall              - PT and OT when appropriate               - Fall precautions     T8 vertebral body fracture with anterior longitudinal ligament disruption   T6, T7 & T8 spinous process fractures              - Neurosurgery assisting with management              - Activity as tolerated              - MRIs of the spine are unable to be completed as pacer/defib not compatible               - Neuro checks              - Maintain spinal neutrality              - Pain control   - S/p kyphoplasty 12/21 with Dr. Sharita Orellana [facility] Sessions consulted PM&R and signed off/ [phone]    Hyperkalemia   -Kayexalate yesterday, repeat K+ last evening 4.6   -Repeat CMP tomorrow    Hx of HTN, A-fib, DM2, CHF              - Hospitalist & cardiology with management and surgical clearance     Pain control                   - Norco. Lidocaine, Morphine PRN     General diet, carb controlled  IVF hydration  Repeat labs in AM  Prophylaxis: SCDs, IS, C&DB, Pepcid, stool softeners   - Start Lovenox today  PT, OT, SLP eval and treat  Discharge disposition pending clinical course   -Patient stable from trauma perspective   -Precert initiated to IPR, discharge/readmit when pre-cert returns     SUBJECTIVE  Patient seen on 8E this morning. Patient stated he was doing good. Patient endorsed pain in back is controlled and rates a 6/10. Patient also endorsed minor headache. Patient denied any other pain or complaints.  Patient denied any lightheadedness, dizziness, neck pain, chest pain, shortness of breath, abdominal pain, nausea/vomiting, pain in extremities, and paresthesias. On exam patient sitting in bedside recliner in no acute distress. PMS intact in all four extremities. Labs and vital signs reviewed. Patient afebrile, vital signs stable. Labs yesterday, no leukocytosis. Hgb 11.2. Start Lovenox for DVT prophylaxis, estimated creatinine clearance, 46 ml/min. Repeat labs in AM. Patient stable from a trauma surgery perspective, awaiting pre-cert to IPR. Case discussed with trauma surgeon, Dr. Jaylin Cox. Wt Readings from Last 3 Encounters:   12/24/21 201 lb 14.4 oz (91.6 kg)   08/11/16 209 lb (94.8 kg)   02/16/16 209 lb 12.8 oz (95.2 kg)     Temp Readings from Last 3 Encounters:   12/23/21 98.1 °F (36.7 °C) (Oral)   02/16/16 96.4 °F (35.8 °C)     BP Readings from Last 3 Encounters:   12/24/21 139/78   12/21/21 (!) 104/54   02/16/16 134/84     Pulse Readings from Last 3 Encounters:   12/24/21 81   02/16/16 72   09/09/13 92       24 HR INTAKE/OUTPUT :     Intake/Output Summary (Last 24 hours) at 12/24/2021 0931  Last data filed at 12/24/2021 5209  Gross per 24 hour   Intake 200 ml   Output 900 ml   Net -700 ml     ADULT DIET; Regular; 4 carb choices (60 gm/meal)    OBJECTIVE  CURRENT VITALS /78   Pulse 81   Temp 98.1 °F (36.7 °C) (Oral)   Resp 16   Ht 6' 1\" (1.854 m)   Wt 201 lb 14.4 oz (91.6 kg)   SpO2 96%   BMI 26.64 kg/m²   GENERAL: Awake, alert, no acute distress, pleasant and cooperative with exam  HEENT: Normocephalic, pupils equal and reactive to light, nares patent bilaterally  NEURO: Alert and orient, GCS 15, follows commands, PMS intact in all four extremities, no signs of focal neurological deficits  CSPINE/BACK: No midline cervical tenderness to palpation  HEART: Regular rate and rhythm with no obvious murmurs, rubs, gallops. Distal pulses intact. LUNGS/CHEST WALL: Lungs are clear to auscultation bilaterally with no wheezes, rales, rhonchi.   No respiratory distress or increased work of breathing. No chest wall tenderness to palpation. ABDOMEN: Abdomen soft, nondistended, with no tenderness to palpation. No guarding or peritoneal signs. Bowel sounds normal active  EXTREMITIES: No cyanosis or edema. PMS intact in all four extremities. No extremity tenderness to palpation. Range of motion intact. Strength 5/5 bilaterally with  strength and plantar/dorsiflexion. SKIN: Warm and dry    LABS  CBC :   Recent Labs     12/22/21 0719 12/23/21 0516   WBC 6.3 6.0   HGB 12.2* 11.2*   HCT 39.6* 36.3*   .8* 100.8*    206     BMP:   Recent Labs     12/22/21 0719 12/23/21 0516 12/23/21 1952 12/24/21 0713   * 137  --   --    K 5.7* 5.5* 4.6  --     102  --   --    CO2 19* 19*  --  25   BUN 42* 43*  --  30*   CREATININE 1.7* 1.7*  --  1.4*     COAGS:   Recent Labs     12/24/21 0713   PROT 5.8*     Pancreas/HFP:  No results for input(s): LIPASE, AMYLASE in the last 72 hours. Recent Labs     12/24/21 0713   AST 17   ALT 9*   BILITOT 0.5   ALKPHOS 117     RADIOLOGY:  XR THORACIC SPINE (3 VIEWS)    Result Date: 12/21/2021  PROCEDURE: XR THORACIC SPINE (3 VIEWS) CLINICAL INFORMATION: T8 kypho. COMPARISON: Thoracic spine CT dated 12/17/2021. TECHNIQUE: Fluoroscopic support was provided for kyphoplasty. There are 14 saved fluoroscopic images. Fluoroscopy time = 192.3 seconds. FINDINGS/IMPRESSION:  Saved fluoroscopic images show instrumentation of the thoracic vertebral body through the bilateral pedicles with injection of radiopaque material within the vertebral body in keeping with history of kyphoplasty. Please refer to the operative report for  further formation. **This report has been created using voice recognition software. It may contain minor errors which are inherent in voice recognition technology. ** Final report electronically signed by Dr. Jeff Goldman MD on 12/21/2021 7:05 PM    FLUORO FOR SURGICAL PROCEDURES    Result Date: 12/21/2021  Radiology exam is complete. No Radiologist dictation. Please follow up with ordering provider.      Electronically signed by Emily Harris PA-C on 12/24/2021 at 9:31 AM

## 2021-12-25 LAB
ALBUMIN SERPL-MCNC: 3.1 G/DL (ref 3.5–5.1)
ALP BLD-CCNC: 114 U/L (ref 38–126)
ALT SERPL-CCNC: 7 U/L (ref 11–66)
AMMONIA: 15 UMOL/L (ref 11–60)
ANION GAP SERPL CALCULATED.3IONS-SCNC: 10 MEQ/L (ref 8–16)
AST SERPL-CCNC: 16 U/L (ref 5–40)
BILIRUB SERPL-MCNC: 0.5 MG/DL (ref 0.3–1.2)
BUN BLDV-MCNC: 25 MG/DL (ref 7–22)
CALCIUM SERPL-MCNC: 8.4 MG/DL (ref 8.5–10.5)
CHLORIDE BLD-SCNC: 103 MEQ/L (ref 98–111)
CO2: 23 MEQ/L (ref 23–33)
CREAT SERPL-MCNC: 1.2 MG/DL (ref 0.4–1.2)
ERYTHROCYTE [DISTWIDTH] IN BLOOD BY AUTOMATED COUNT: 12.7 % (ref 11.5–14.5)
ERYTHROCYTE [DISTWIDTH] IN BLOOD BY AUTOMATED COUNT: 45.3 FL (ref 35–45)
GFR SERPL CREATININE-BSD FRML MDRD: 57 ML/MIN/1.73M2
GLUCOSE BLD-MCNC: 127 MG/DL (ref 70–108)
GLUCOSE BLD-MCNC: 137 MG/DL (ref 70–108)
GLUCOSE BLD-MCNC: 141 MG/DL (ref 70–108)
GLUCOSE BLD-MCNC: 142 MG/DL (ref 70–108)
GLUCOSE BLD-MCNC: 148 MG/DL (ref 70–108)
GLUCOSE BLD-MCNC: 179 MG/DL (ref 70–108)
HCT VFR BLD CALC: 34.7 % (ref 42–52)
HEMOGLOBIN: 11.2 GM/DL (ref 14–18)
MCH RBC QN AUTO: 31.6 PG (ref 26–33)
MCHC RBC AUTO-ENTMCNC: 32.3 GM/DL (ref 32.2–35.5)
MCV RBC AUTO: 98 FL (ref 80–94)
PLATELET # BLD: 205 THOU/MM3 (ref 130–400)
PMV BLD AUTO: 8.7 FL (ref 9.4–12.4)
POTASSIUM SERPL-SCNC: 4.7 MEQ/L (ref 3.5–5.2)
RBC # BLD: 3.54 MILL/MM3 (ref 4.7–6.1)
SODIUM BLD-SCNC: 136 MEQ/L (ref 135–145)
TOTAL PROTEIN: 5.6 G/DL (ref 6.1–8)
WBC # BLD: 5.7 THOU/MM3 (ref 4.8–10.8)

## 2021-12-25 PROCEDURE — 6370000000 HC RX 637 (ALT 250 FOR IP): Performed by: INTERNAL MEDICINE

## 2021-12-25 PROCEDURE — 82140 ASSAY OF AMMONIA: CPT

## 2021-12-25 PROCEDURE — 80053 COMPREHEN METABOLIC PANEL: CPT

## 2021-12-25 PROCEDURE — 6370000000 HC RX 637 (ALT 250 FOR IP): Performed by: PHYSICIAN ASSISTANT

## 2021-12-25 PROCEDURE — 36415 COLL VENOUS BLD VENIPUNCTURE: CPT

## 2021-12-25 PROCEDURE — 1200000000 HC SEMI PRIVATE

## 2021-12-25 PROCEDURE — APPSS60 APP SPLIT SHARED TIME 46-60 MINUTES: Performed by: NURSE PRACTITIONER

## 2021-12-25 PROCEDURE — 82948 REAGENT STRIP/BLOOD GLUCOSE: CPT

## 2021-12-25 PROCEDURE — 85027 COMPLETE CBC AUTOMATED: CPT

## 2021-12-25 PROCEDURE — 6360000002 HC RX W HCPCS: Performed by: PHYSICIAN ASSISTANT

## 2021-12-25 PROCEDURE — 99024 POSTOP FOLLOW-UP VISIT: CPT | Performed by: SURGERY

## 2021-12-25 RX ORDER — FAMOTIDINE 20 MG/1
20 TABLET, FILM COATED ORAL DAILY
Status: DISCONTINUED | OUTPATIENT
Start: 2021-12-26 | End: 2021-12-31 | Stop reason: HOSPADM

## 2021-12-25 RX ADMIN — ASPIRIN 325 MG: 325 TABLET, COATED ORAL at 10:00

## 2021-12-25 RX ADMIN — MIDODRINE HYDROCHLORIDE 2.5 MG: 2.5 TABLET ORAL at 10:00

## 2021-12-25 RX ADMIN — SENNOSIDES 17.2 MG: 8.6 TABLET, COATED ORAL at 22:41

## 2021-12-25 RX ADMIN — POLYETHYLENE GLYCOL 3350 17 G: 17 POWDER, FOR SOLUTION ORAL at 22:44

## 2021-12-25 RX ADMIN — ACETAMINOPHEN 650 MG: 325 TABLET ORAL at 06:12

## 2021-12-25 RX ADMIN — CARVEDILOL 6.25 MG: 6.25 TABLET, FILM COATED ORAL at 17:24

## 2021-12-25 RX ADMIN — INSULIN LISPRO 1 UNITS: 100 INJECTION, SOLUTION INTRAVENOUS; SUBCUTANEOUS at 17:28

## 2021-12-25 RX ADMIN — SODIUM PHOSPHATE 1 ENEMA: 7; 19 ENEMA RECTAL at 04:12

## 2021-12-25 RX ADMIN — LEVOTHYROXINE SODIUM 150 MCG: 0.15 TABLET ORAL at 06:12

## 2021-12-25 RX ADMIN — CARVEDILOL 6.25 MG: 6.25 TABLET, FILM COATED ORAL at 10:00

## 2021-12-25 RX ADMIN — INSULIN GLARGINE 6 UNITS: 100 INJECTION, SOLUTION SUBCUTANEOUS at 22:27

## 2021-12-25 RX ADMIN — INSULIN LISPRO 1 UNITS: 100 INJECTION, SOLUTION INTRAVENOUS; SUBCUTANEOUS at 22:28

## 2021-12-25 RX ADMIN — MIDODRINE HYDROCHLORIDE 2.5 MG: 2.5 TABLET ORAL at 13:42

## 2021-12-25 RX ADMIN — MIDODRINE HYDROCHLORIDE 2.5 MG: 2.5 TABLET ORAL at 17:24

## 2021-12-25 RX ADMIN — TORSEMIDE 20 MG: 20 TABLET ORAL at 10:00

## 2021-12-25 RX ADMIN — ENOXAPARIN SODIUM 40 MG: 100 INJECTION SUBCUTANEOUS at 10:07

## 2021-12-25 RX ADMIN — POLYETHYLENE GLYCOL 3350 17 G: 17 POWDER, FOR SOLUTION ORAL at 10:01

## 2021-12-25 RX ADMIN — ATORVASTATIN CALCIUM 40 MG: 40 TABLET, FILM COATED ORAL at 22:41

## 2021-12-25 ASSESSMENT — PAIN SCALES - GENERAL
PAINLEVEL_OUTOF10: 0
PAINLEVEL_OUTOF10: 0

## 2021-12-25 NOTE — PROGRESS NOTES
Renal Adjustment Follow-up    Recent Labs     12/24/21  0713 12/25/21  0733   BUN 30* 25*   CREATININE 1.4* 1.2     Estimated Creatinine Clearance: 47 mL/min (based on SCr of 1.2 mg/dL). Plan: Scr has improved.  Increase Pepcid to 20 mg daily at this time    Kristin Sabillon, JarochoD, BCPS  12/25/2021  2:17 PM

## 2021-12-26 LAB
GLUCOSE BLD-MCNC: 122 MG/DL (ref 70–108)
GLUCOSE BLD-MCNC: 128 MG/DL (ref 70–108)
GLUCOSE BLD-MCNC: 139 MG/DL (ref 70–108)
GLUCOSE BLD-MCNC: 178 MG/DL (ref 70–108)

## 2021-12-26 PROCEDURE — 6370000000 HC RX 637 (ALT 250 FOR IP): Performed by: PHYSICIAN ASSISTANT

## 2021-12-26 PROCEDURE — 99232 SBSQ HOSP IP/OBS MODERATE 35: CPT | Performed by: SURGERY

## 2021-12-26 PROCEDURE — 97535 SELF CARE MNGMENT TRAINING: CPT

## 2021-12-26 PROCEDURE — APPSS45 APP SPLIT SHARED TIME 31-45 MINUTES: Performed by: PHYSICIAN ASSISTANT

## 2021-12-26 PROCEDURE — 1200000000 HC SEMI PRIVATE

## 2021-12-26 PROCEDURE — 99231 SBSQ HOSP IP/OBS SF/LOW 25: CPT | Performed by: INTERNAL MEDICINE

## 2021-12-26 PROCEDURE — 97110 THERAPEUTIC EXERCISES: CPT

## 2021-12-26 PROCEDURE — 6360000002 HC RX W HCPCS: Performed by: PHYSICIAN ASSISTANT

## 2021-12-26 PROCEDURE — 6370000000 HC RX 637 (ALT 250 FOR IP): Performed by: NURSE PRACTITIONER

## 2021-12-26 PROCEDURE — 82948 REAGENT STRIP/BLOOD GLUCOSE: CPT

## 2021-12-26 PROCEDURE — 6370000000 HC RX 637 (ALT 250 FOR IP): Performed by: INTERNAL MEDICINE

## 2021-12-26 RX ORDER — BISACODYL 10 MG
10 SUPPOSITORY, RECTAL RECTAL DAILY
Status: DISCONTINUED | OUTPATIENT
Start: 2021-12-26 | End: 2021-12-31 | Stop reason: HOSPADM

## 2021-12-26 RX ORDER — ASPIRIN 81 MG/1
81 TABLET ORAL DAILY
Status: DISCONTINUED | OUTPATIENT
Start: 2021-12-27 | End: 2021-12-31 | Stop reason: HOSPADM

## 2021-12-26 RX ADMIN — SENNOSIDES 17.2 MG: 8.6 TABLET, COATED ORAL at 21:47

## 2021-12-26 RX ADMIN — INSULIN GLARGINE 6 UNITS: 100 INJECTION, SOLUTION SUBCUTANEOUS at 21:43

## 2021-12-26 RX ADMIN — BISACODYL 10 MG: 10 SUPPOSITORY RECTAL at 18:03

## 2021-12-26 RX ADMIN — TORSEMIDE 20 MG: 20 TABLET ORAL at 10:31

## 2021-12-26 RX ADMIN — ACETAMINOPHEN 650 MG: 325 TABLET ORAL at 21:51

## 2021-12-26 RX ADMIN — ATORVASTATIN CALCIUM 40 MG: 40 TABLET, FILM COATED ORAL at 21:47

## 2021-12-26 RX ADMIN — CARVEDILOL 6.25 MG: 6.25 TABLET, FILM COATED ORAL at 10:31

## 2021-12-26 RX ADMIN — INSULIN LISPRO 1 UNITS: 100 INJECTION, SOLUTION INTRAVENOUS; SUBCUTANEOUS at 13:30

## 2021-12-26 RX ADMIN — MIDODRINE HYDROCHLORIDE 2.5 MG: 2.5 TABLET ORAL at 18:02

## 2021-12-26 RX ADMIN — ENOXAPARIN SODIUM 40 MG: 100 INJECTION SUBCUTANEOUS at 10:31

## 2021-12-26 RX ADMIN — BENZOCAINE AND MENTHOL 1 LOZENGE: 15; 3.6 LOZENGE ORAL at 21:43

## 2021-12-26 RX ADMIN — ACETAMINOPHEN 650 MG: 325 TABLET ORAL at 15:52

## 2021-12-26 RX ADMIN — ACETAMINOPHEN 650 MG: 325 TABLET ORAL at 10:31

## 2021-12-26 RX ADMIN — LEVOTHYROXINE SODIUM 150 MCG: 0.15 TABLET ORAL at 06:26

## 2021-12-26 RX ADMIN — POLYETHYLENE GLYCOL 3350 17 G: 17 POWDER, FOR SOLUTION ORAL at 21:47

## 2021-12-26 RX ADMIN — FAMOTIDINE 20 MG: 20 TABLET ORAL at 10:31

## 2021-12-26 RX ADMIN — CARVEDILOL 6.25 MG: 6.25 TABLET, FILM COATED ORAL at 18:02

## 2021-12-26 RX ADMIN — ASPIRIN 325 MG: 325 TABLET, COATED ORAL at 10:31

## 2021-12-26 RX ADMIN — MIDODRINE HYDROCHLORIDE 2.5 MG: 2.5 TABLET ORAL at 10:31

## 2021-12-26 RX ADMIN — POLYETHYLENE GLYCOL 3350 17 G: 17 POWDER, FOR SOLUTION ORAL at 10:32

## 2021-12-26 RX ADMIN — MIDODRINE HYDROCHLORIDE 2.5 MG: 2.5 TABLET ORAL at 13:33

## 2021-12-26 ASSESSMENT — PAIN - FUNCTIONAL ASSESSMENT: PAIN_FUNCTIONAL_ASSESSMENT: ACTIVITIES ARE NOT PREVENTED

## 2021-12-26 ASSESSMENT — PAIN SCALES - GENERAL
PAINLEVEL_OUTOF10: 5
PAINLEVEL_OUTOF10: 5
PAINLEVEL_OUTOF10: 6

## 2021-12-26 ASSESSMENT — ENCOUNTER SYMPTOMS: BACK PAIN: 1

## 2021-12-26 ASSESSMENT — PAIN DESCRIPTION - PAIN TYPE: TYPE: ACUTE PAIN

## 2021-12-26 ASSESSMENT — PAIN DESCRIPTION - PROGRESSION
CLINICAL_PROGRESSION: NOT CHANGED
CLINICAL_PROGRESSION: NOT CHANGED

## 2021-12-26 ASSESSMENT — PAIN DESCRIPTION - LOCATION: LOCATION: BACK

## 2021-12-26 ASSESSMENT — PAIN DESCRIPTION - FREQUENCY: FREQUENCY: CONTINUOUS

## 2021-12-26 ASSESSMENT — PAIN DESCRIPTION - DESCRIPTORS: DESCRIPTORS: ACHING

## 2021-12-26 ASSESSMENT — PAIN DESCRIPTION - ONSET: ONSET: ON-GOING

## 2021-12-26 NOTE — PROGRESS NOTES
Neurology Progress Note    Date:12/26/2021       Room:[facility]70/8E-70  Patient Diaz Ocasio     YOB: 1932     Age:89 y.o.    CC: stroke alert, AMS    HPI: Aidan Hayward who is a 80 y.o. male with a history of arthritis, CHF, hypertension, thyroid disease, diabetes, and dementia who is currently admitted for a fall with a thoracic fracture status post kyphoplasty. Stroke alert activation was called today at approximately 3:20 PM due to strokelike symptoms including left-sided facial droop, confusion, and some ataxia. The patient's initial NIHSS was 5, but upon reassessment with , the patient's NIH stroke scale was 0. As such, TPA was not indicated. Additionally, it was noted that the patient asterixis on exam, which is more likely metabolic in nature and vascular. A stat CT of the head was obtained which was negative for any acute findings. CT angiograms were deferred due to the patient's low stroke scale and exam findings. Upon further interview, the patient is a poor historian and is unable to provide a reliable history. He displays poor attention and concentration. He denies any acute complaints. His son who is present at bedside, does state the patient seems to have some dementia, but he has no history of prior stroke. Time of symptoms onset/last time seen at baseline: 1:50 PM.  Time of stroke alert: 3:18 PM.  Time of Neurology arrival: 3:20 PM.  Vascular risk factors: Diabetes, history of smoking. Initial Glucose: 119. Old deficits from prior stroke: None. INR in ED if anticoagulated: Not applicable. BP in ED: 149/73. Initial NIHSS: 0.  Modified [place] Score upon admission: 0.   IV tPA administerd: Not indicated. Time of Initial Imaging Read: 4:09 PM.  Thrombectomy performed: Not indicated. Puncture time: Not indicated. Interval history 12/26/2021: The patient states he is doing well today. He does still have some pain and a little bit of numbness in his back. He denies any dizziness, weakness, headache, lightheadedness, numbness, speech difficulties, or vision changes. He does note some intermittent confusion. He has no history of seizure disorder and there has been no seizure-like activity noted by the staff. The patient does live at home with his wife of 59 years. He does report recent deterioration in his short-term memory and has recently passed his finances onto his son to manage. Patient does have a telemetry sitter as he attempted to get out of bed overnight a couple nights ago. His nurse has not noted any confusion, but does note the patient is hard of hearing and does not have his hearing aids which sometimes limits communication. Review of Systems   Review of Systems   Eyes: Negative for visual disturbance. Musculoskeletal: Positive for back pain. Neurological: Negative for dizziness, speech difficulty, weakness, numbness and headaches. Psychiatric/Behavioral: Positive for confusion (improved).        Medications   Scheduled Meds:    famotidine  20 mg Oral Daily    enoxaparin  40 mg SubCUTAneous Daily    polyethylene glycol  17 g Oral BID    senna  2 tablet Oral Nightly    torsemide  20 mg Oral Daily    midodrine  2.5 mg Oral TID WC    aspirin  325 mg Oral Daily    sodium chloride flush  5-40 mL IntraVENous 2 times per day    [Held by provider] glipiZIDE  5 mg Oral QAM AC    [Held by provider] alogliptin  12.5 mg Oral Daily    carvedilol  6.25 mg Oral BID WC    atorvastatin  40 mg Oral Nightly    levothyroxine  150 mcg Oral Daily    insulin glargine  6 Units SubCUTAneous Nightly    lidocaine  3 patch Topical Daily    insulin lispro  0-6 Units SubCUTAneous TID     insulin lispro  0-3 Units SubCUTAneous Nightly     Continuous Infusions:    sodium chloride      dextrose       PRN Meds: acetaminophen, morphine **OR** [DISCONTINUED] morphine, sodium chloride flush, sodium chloride, ondansetron **OR** ondansetron, HYDROmorphone, benzocaine-menthol, fleet, cyclobenzaprine, HYDROcodone 5 mg - acetaminophen **OR** HYDROcodone 5 mg - acetaminophen, glucose, dextrose, glucagon (rDNA), dextrose    Past History    Past Medical History:   has a past medical history of Arthritis, CHF (congestive heart failure) ([address] Constipation, Hypertension, SunDown syndrome, Thyroid disease, and Type II or unspecified type diabetes mellitus without mention of complication, not stated as uncontrolled. Social History:   reports that he quit smoking about 38 years ago. He has never used smokeless tobacco. He reports that he does not drink alcohol and does not use drugs. Family History:   Family History   Problem Relation Age of Onset    Stroke Mother     Arthritis Father     Heart Disease Father        Physical Examination        Vitals:  BP (!) 153/91   Pulse 88   Temp 97.8 °F (36.6 °C) (Oral)   Resp 17   Ht 6' 1\" (1.854 m)   Wt 205 lb 4.8 oz (93.1 kg)   SpO2 96%   BMI 27.09 kg/m²   Temp (24hrs), Av.9 °F (36.6 °C), Min:97.8 °F (36.6 °C), Max:98 °F (36.7 °C)      I/O (24Hr): Intake/Output Summary (Last 24 hours) at 2021 0814  Last data filed at 2021 0217  Gross per 24 hour   Intake 300 ml   Output 1450 ml   Net -1150 ml       Physical Exam  Vitals reviewed. Constitutional:       General: He is not in acute distress. Appearance: Normal appearance. He is not ill-appearing. HENT:      Head: Normocephalic and atraumatic. Right Ear: External ear normal.      Left Ear: External ear normal.      Nose: Nose normal.      Mouth/Throat:      Mouth: Mucous membranes are moist.      Pharynx: No oropharyngeal exudate or posterior oropharyngeal erythema. Eyes:      Extraocular Movements: EOM normal.   Cardiovascular:      Rate and Rhythm: Normal rate and regular rhythm. Heart sounds: Normal heart sounds. No murmur heard. Pulmonary:      Effort: Pulmonary effort is normal. No respiratory distress.       Breath sounds: Light touch normal.        Labs/Imaging/Diagnostics   Labs:  CBC:  Recent Labs     12/25/21  0733   WBC 5.7   RBC 3.54*   HGB 11.2*   HCT 34.7*   MCV 98.0*        CHEMISTRIES:  Recent Labs     12/23/21 1952 12/24/21 0713 12/25/21 0733   NA  --  137 136   K 4.6 4.4 4.7   CL  --  103 103   CO2  --  25 23   BUN  --  30* 25*   CREATININE  --  1.4* 1.2   GLUCOSE  --  113* 141*     PT/INR:No results for input(s): PROTIME, INR in the last 72 hours. APTT:No results for input(s): APTT in the last 72 hours. LIVER PROFILE:  Recent Labs     12/24/21 0713 12/25/21 0733   AST 17 16   ALT 9* 7*   BILITOT 0.5 0.5   ALKPHOS 117 114       Imaging Last 24 Hours:  CT HEAD WO CONTRAST    Result Date: 12/24/2021  PROCEDURE: CT HEAD WO CONTRAST CLINICAL INFORMATION: Code stroke COMPARISON: No prior study. TECHNIQUE:  Axial CT images were obtained through the head without contrast. Coronal and sagittal reformatted images were rendered. All CT scans at this facility use dose modulation, iterative reconstruction, and/or weight-based dosing when appropriate to reduce radiation dose to as low as reasonably achievable. FINDINGS: No acute intracranial hemorrhage or mass effect is seen. There is moderate diffuse atrophy. There is no midline shift. The basal cisterns and posterior fossa appear within normal limits. No acute abnormalities of the calvarium are seen. Parasellar carotid artery vascular calcifications are seen. There is mild mucosal thickening within the right maxillary sinus. Mild patchy periventricular white matter hypoattenuation is demonstrated likely representing sequela from mild chronic small vessel ischemic disease. 1. No acute intracranial hemorrhage or mass effect is seen. This was discussed with the referring clinician at the time of dictation. 2. There is moderate diffuse atrophy. **This report has been created using voice recognition software.   It may contain minor errors which are inherent in voice recognition technology. ** Final report electronically signed by Dr. Naa Tate on 12/24/2021 4:09 PM    XR CHEST 1 VIEW    Result Date: 12/24/2021  PROCEDURE: XR CHEST 1 VIEW CLINICAL INFORMATION: Stroke TECHNIQUE: AP supine chest radiograph COMPARISON: Mobile AP chest radiograph 12/18/2021 FINDINGS: A left-sided cardiac device is in stable position. There is mild stable enlargement of the cardiac silhouette. Atherosclerotic calcifications are present in the aorta. Minimal reticular opacities are seen at the left lung base. Degenerative and vertebroplasty changes in the thoracic spine are poorly visualized. Surgical clips are present in the upper abdomen. 1. Minimal left basilar atelectasis/infiltrate. 2. Mild stable cardiomegaly. Final report electronically signed by Dr. Ruperto Pichardo on 12/24/2021 4:15 PM        Assessment and Plan:          1. Altered mental status, improved. · CT head negative for acute findings  · Decrease aspirin to 81 mg daily  · Continue Lipitor 40 mg.  · Laboratory work-up largely unremarkable  · No further work-up is needed from a neurologic standpoint on an inpatient basis. · Due to the patient's intermittent confusion and memory issues, outpatient work-up for dementia can be considered  · Neurology will sign off at this time. Please call with any questions or concerns. This case was discussed with Dr. Cecilia Guillen and he is in agreement with the assessment and plan.     Electronically signed by Nicholas Elizondo PA-C on 12/26/21 at 1:56 PM EST

## 2021-12-26 NOTE — PROGRESS NOTES
[address]GE OVER[address]  Occupational Therapy  Daily Note  Time:   Time In: 4612  Time Out: 1518  Timed Code Treatment Minutes: 23 Minutes  Minutes: 23          Date: 2021  Patient Name: Lorraine Currie,   Gender: male      Room: 8E-70/8E-70A  MRN: [id number]  : 10/25/1932  (80 y.o.)  Referring Practitioner: Jocelynn Kapoor PA-C  Diagnosis: Fall at [facility]  Additional Pertinent Hx: Carina Narvaez from standing position a couple days ago at the time EMS was called and they assisted him off the floor. Yesterday his pain was getting worse and therefore went to his local hospital at [facility]. There he was found to have fractures of his thoracic spine and was transferred to us for further evaluation. He does not take any blood thinners. He complains of pain in his neck and back. He had a negative CT cervical spine at the outside hospital and received a dose of fentanyl prior to transfer. Pt is S/P kyphoplasty at T8 on 21. Restrictions/Precautions:  Restrictions/Precautions: Fall Risk,General Precautions  Position Activity Restriction  Other position/activity restrictions: avoid excessive trunk flexion     SUBJECTIVE:  RN okayed OT session. Upon entering room pt supine in bed, agreeable to OT session. Pleasant and cooperative. PAIN: 5-8/10 nursing aware and addressed     Vitals: Vitals not assessed per clinical judgement, see nursing flowsheet    COGNITION: Decreased Insight, Impaired Memory, Decreased Problem Solving and Decreased Safety Awareness    ADL:   Grooming: Stand By Assistance. Standing sink side washing hands for safety   Toilet Transfer: [address] transfering onto toilet as patient reports need to have BM, however unable to have BM at this time. BALANCE:  Standing Balance: Stand By Assistance.  standing at RW level     BED MOBILITY:  Supine to Sit: Moderate Assistance Pt demonstrates decreased activity tolerance with pain     TRANSFERS:  Sit to Stand:  Contact Guard Assistance. standing from EOB   Stand to Sit: Contact Guard Assistance. sitting onto EOB     FUNCTIONAL MOBILITY:  Assistive Device: Rolling Walker  Assist Level:  Contact Guard Assistance. Distance: To and from bathroom  Pt demonstrates shuffled gait      ADDITIONAL ACTIVITIES:  Pt completed BUE exercises to improve strength and endurance to increase indep in self care ADL routine. Pt completed B UE exs with moderate resistance band x 10 reps, x 1 set seated. Fair tolerance of exs, with 1 RBs throughout, and max tactile, verbal and visual  cues for tech with resistance band. ASSESSMENT:     Activity Tolerance:  Patient tolerance of  treatment: fair. Pt well motivated, however, confused  Discharge Recommendations: Continue to assess pending progress  Equipment Recommendations: Other: Will continue to monitor  Plan: Times per week: 6x  Specific instructions for Next Treatment: Functional mobility; ADLs and standing balance; UE exercises and back protection; safety awareness  Current Treatment Recommendations: Strengthening,Balance Training,Functional Mobility Training,Endurance Training,Safety Education & Training,Self-Care / ADL  Plan Comment: Pt would benefit from continued skilled OT services when medically stable and discharged from Acute. Kelvin Gonzalez is recommended. Patient Education  Patient Education: Role of OT, Plan of Care and ADL's    Goals  Short term goals  Time Frame for Short term goals: By discharge  Short term goal 1: Pt will demonstrate functional mobility walking to/from the bathroom while using a rolling walker with no > than CGA to prepare for doing sinkside ADLs. Short term goal 2: Pt will complete grooming or dressing while standing and using 1 hand release with CGA to increase his balance and safety while doing his self care.   Short term goal 3: Pt will complete transfers to/from an elevated toilet seat with armrests or a bedside commode with SBA to increase his independence with toileting routine. Short term goal 4: Pt will complete BUE ROM/moderate resistance exercises while following back protection techniques with demonstration to increase his endurance and strength for ease of doing ADLs and functional mobility. Short term goal 5: Pt will complete a spongebath or shower with no >than MIN A and verbal cues for safety to increase his independence with self care. Following session, patient left in safe position with all fall risk precautions in place.

## 2021-12-26 NOTE — PROGRESS NOTES
[facility] - Dr. Ty Rivas covering Dr. Ton Lawson  Daily Progress Note  Pt Name: Amelia Jacobsen Record Number: [id number]  Date of Birth 10/25/1932   Today's Date: 12/26/2021    HD: # 8    CC: Back pain     ASSESSMENT  1. Active Hospital Problems    Diagnosis Date Noted    Fall at home [P37. Oran Ahumada, B62.637] 12/18/2021    Closed fracture of eighth thoracic vertebra [facility]) [S22.069A] 12/18/2021       PROCEDURES  12/21/21 - Kyphoplasty     PLAN  Admit to Trauma Services     Trauma by fall              - PT and OT to continue               - Fall precautions     T8 vertebral body fracture with anterior longitudinal ligament disruption   T6, T7 & T8 spinous process fractures              - Neurosurgery assisting with management              - Activity as tolerated              - MRIs of the spine are unable to be completed as pacer/defib not compatible               - Neuro checks              - Maintain spinal neutrality              - Pain control   - S/p kyphoplasty 12/21 with Dr. Dian Lan   - Michelle Child consulted PM&R and signed off 12/22    Hyperkalemia - resolved   -Treated with Kayexalate   -Repeat CMP yesterday, K+ 4.7    Acute metabolic encephalopathy   - Stroke alert called 12/24   - Neurology saw, CT head negative, stating likely metabolic encephalopathy   - Started Aspirin 325 mg daily    - Hospitalist following for medical management    Constipation   - Patient unsure of last BM   - No documented bowel movements   - Denied abdominal pain, nausea, or vomiting. Tolerating diet   - On senna nightly and gylcolax BID.  Fleet enema ordered prn    - Fleet enema yesterday   - Continue to monitor, add dulcolax suppositories today    Hx of HTN, A-fib, DM2, CHF              - Hospitalist & cardiology assisting with management and surgical clearance     Pain control                   - Norco. Lidocaine, Morphine PRN     General diet, carb controlled  Repeat labs stable  Prophylaxis: SCDs, IS, C&DB, Pepcid, stool softeners, Lovenox   PT, OT, SLP eval and treat  Discharge disposition pending clinical course   -Patient stable from trauma perspective   -Precert initiated to [facility], discharge/readmit when pre-cert returns     SUBJECTIVE  Patient seen on 8E this morning. Patient stated he was doing good. Patient endorsed pain in back with activity but pain is well controlled. Patient also endorsed minor headache. Patient denied any other pain or complaints. Patient denied any lightheadedness, dizziness, neck pain, chest pain, shortness of breath, abdominal pain, nausea/vomiting, pain in extremities, and paresthesias. On exam patient sitting in bedside recliner in no acute distress. PMS intact in all four extremities. No signs of focal neurological deficits. A&Ox3. Labs and vital signs reviewed. Patient afebrile, vital signs stable. Labs yesterday, no leukocytosis. Hgb stable at 11.2. Lovenox for DVT prophylaxis. Tolerating diet. Passing flatus but no bowel movements reported. Patient unsure last bowel movement. Continue Senna, Glycolax, and enema daily. Added dulcolax suppository today. Patient stable from a trauma surgery perspective, awaiting pre-cert to [facility]. Case discussed with trauma surgeon, Dr. Vahe Lawson.         Wt Readings from Last 3 Encounters:   12/26/21 205 lb 4.8 oz (93.1 kg)   08/11/16 209 lb (94.8 kg)   02/16/16 209 lb 12.8 oz (95.2 kg)     Temp Readings from Last 3 Encounters:   12/25/21 97.8 °F (36.6 °C) (Oral)   02/16/16 96.4 °F (35.8 °C)     BP Readings from Last 3 Encounters:   12/25/21 (!) 153/91   12/21/21 (!) 104/54   02/16/16 134/84     Pulse Readings from Last 3 Encounters:   12/25/21 88   02/16/16 72   09/09/13 92       24 HR INTAKE/OUTPUT :     Intake/Output Summary (Last 24 hours) at 12/26/2021 1220  Last data filed at 12/26/2021 0217  Gross per 24 hour   Intake 150 ml   Output 1150 ml   Net -1000 ml     ADULT DIET;  Regular; 4 carb choices (60 gm/meal)    OBJECTIVE  CURRENT VITALS BP (!) 153/91   Pulse 88   Temp 97.8 °F (36.6 °C) (Oral)   Resp 17   Ht 6' 1\" (1.854 m)   Wt 205 lb 4.8 oz (93.1 kg)   SpO2 96%   BMI 27.09 kg/m²   GENERAL: Awake, alert, no acute distress, pleasant and cooperative with exam  HEENT: Normocephalic, pupils equal and reactive to light, nares patent bilaterally  NEURO: Alert and orient x3, GCS 15, follows commands, PMS intact in all four extremities, no signs of focal neurological deficits  CSPINE/BACK: No midline cervical tenderness to palpation  HEART: Regular rate and rhythm with no obvious murmurs, rubs, gallops. Distal pulses intact. LUNGS/CHEST WALL: Lungs are clear to auscultation bilaterally with no wheezes, rales, rhonchi. No respiratory distress or increased work of breathing. No chest wall tenderness to palpation. ABDOMEN: Abdomen soft, nondistended, with no tenderness to palpation. No guarding or peritoneal signs. Bowel sounds normal active  EXTREMITIES: No cyanosis or edema. PMS intact in all four extremities. No extremity tenderness to palpation. Range of motion intact. Strength 5/5 bilaterally with  strength and plantar/dorsiflexion. SKIN: Warm and dry    LABS  CBC :   Recent Labs     12/25/21 0733   WBC 5.7   HGB 11.2*   HCT 34.7*   MCV 98.0*        BMP:   Recent Labs     12/23/21 1952 12/24/21 0713 12/25/21  0733   NA  --  137 136   K 4.6 4.4 4.7   CL  --  103 103   CO2  --  25 23   BUN  --  30* 25*   CREATININE  --  1.4* 1.2     COAGS:   Recent Labs     12/24/21  0713 12/25/21  0733   PROT 5.8* 5.6*     Pancreas/HFP:  No results for input(s): LIPASE, AMYLASE in the last 72 hours. Recent Labs     12/24/21  0713 12/25/21  0733   AST 17 16   ALT 9* 7*   BILITOT 0.5 0.5   ALKPHOS 117 114     RADIOLOGY:  CT HEAD WO CONTRAST    Result Date: 12/24/2021  PROCEDURE: CT HEAD WO CONTRAST CLINICAL INFORMATION: Code stroke COMPARISON: No prior study.  TECHNIQUE:  Axial CT images were obtained through the head without contrast. Coronal and sagittal reformatted images were rendered. All CT scans at this facility use dose modulation, iterative reconstruction, and/or weight-based dosing when appropriate to reduce radiation dose to as low as reasonably achievable. FINDINGS: No acute intracranial hemorrhage or mass effect is seen. There is moderate diffuse atrophy. There is no midline shift. The basal cisterns and posterior fossa appear within normal limits. No acute abnormalities of the calvarium are seen. Parasellar carotid artery vascular calcifications are seen. There is mild mucosal thickening within the right maxillary sinus. Mild patchy periventricular white matter hypoattenuation is demonstrated likely representing sequela from mild chronic small vessel ischemic disease. 1. No acute intracranial hemorrhage or mass effect is seen. This was discussed with the referring clinician at the time of dictation. 2. There is moderate diffuse atrophy. **This report has been created using voice recognition software. It may contain minor errors which are inherent in voice recognition technology. ** Final report electronically signed by Dr. Kayce Shi on 12/24/2021 4:09 PM    XR CHEST 1 VIEW    Result Date: 12/24/2021  PROCEDURE: XR CHEST 1 VIEW CLINICAL INFORMATION: Stroke TECHNIQUE: AP supine chest radiograph COMPARISON: Mobile AP chest radiograph 12/18/2021 FINDINGS: A left-sided cardiac device is in stable position. There is mild stable enlargement of the cardiac silhouette. Atherosclerotic calcifications are present in the aorta. Minimal reticular opacities are seen at the left lung base. Degenerative and vertebroplasty changes in the thoracic spine are poorly visualized. Surgical clips are present in the upper abdomen. 1. Minimal left basilar atelectasis/infiltrate. 2. Mild stable cardiomegaly.  Final report electronically signed by Dr. Bryon Giraldo on 12/24/2021 4:15 PM    Electronically signed by Anil Patrick PA-C on 12/26/2021 at 12:20 PM

## 2021-12-26 NOTE — PLAN OF CARE
Problem: Pain:  Goal: Control of acute pain  Description: Control of acute pain  Outcome: Ongoing  Patient denies pain at this time, will continue to assess. Problem: Falls - Risk of:  Goal: Will remain free from falls  Description: Will remain free from falls  Outcome: Ongoing  Patient will continue to use call light for assistance to prevent falls and promote patient safety. Problem: Skin Integrity:  Goal: Will show no infection signs and symptoms  Description: Will show no infection signs and symptoms  Outcome: Ongoing  Patient does not have any impaired skin integrity issues or signs/symptoms of infection at this time. Will continue to assess.

## 2021-12-26 NOTE — PROGRESS NOTES
Hospitalist Progress Note      Patient:  Roderick Amezquita    Unit/Bed:8E-70/8E-70A  YOB: 1932  MRN: [id number]   Acct: [de-identified]   PCP: MESSI Giang  Date of Admission: 12/18/2021    Assessment/Plan:    1. Fall with T8 fx: s/p kyphoplasty on 12/21 12/24/21: continue therapy as planned  2. Hyperkalemia: Pts home torsemide was held, given kayexelate with improvement  12/24/21: 4.4 today, recheck tomorrow  3. CKD: unclear baseline at this time, but was 2.1 in 2013 12/24/21: currently 1.4, stop IVF and resume torsemide (once daily instead of twice a day for now and monitor)  4. Constipation: longstanding issue per family  12/24/21: family and pt can't recall any BM during this hospitalization, none documented, already has prn ordered (but not given) so will increase miralax to BID and add nightly senna  12/26/21: Add magnesium citrate and monitor for BM as only had scant smear this AM  5. Essential HTN: wean down midodrine to 2.5mg on 12/24/21 and monitor BP  6. AFib: on Coreg, not on home amiodarone, continue to monitor HR  7. Acute metabolic encephalopathy: mild, stroke alert called shortly after patient was seen due to facial droop; droop resolved during examination by neurologist, Dr. Reese Oscar, exam otherwise consistent with toxic - possibly renal or hepatic etiology. Pt with CKD, LFTs previously good, will confirm NH3 in AM is normal, family cites mentation is slightly worse than typical, but he is tangential at baseline. Expected discharge date:  ? Disposition:    [] Home       [] TCU       [x] Rehab       [] Psych       [x] SNF       [] [place]       [] Other-    Chief Complaint: [facility] Treatment Course: (Prior to today) \"He is a 80 y. o. male who continues to present at [facility]. [facility] on [facility] following a fall. Patient states tolerating kyphoplasty, no significant pain.  Patient denies chest pain, shortness of breath, cough, headache, dizziness, lightheadedness, numbness, paraesthesias, weakness, chills, fevers, abdominal pain, nausea, vomiting,neck pain, or back pain.  Nursing staff states patient hyperkalemic at 5.5, Kayexalate ordered. Vital signs stable on exam. Care in coordination with trauma surgeon Dr. Andreia Reddy. \"    12/24/21: work on treating constipation, stop IVF     Subjective (past 24 hours): More alert than when seen 2 days prior; states he is feeling well, doesn't think he has had a real BM in a long time      Past medical history, family history, social history and allergies reviewed again and is unchanged since admission. ROS (12 point review of systems completed. Pertinent positives noted.  Otherwise ROS is negative)     Medications:  Reviewed    Infusion Medications    sodium chloride      dextrose       Scheduled Medications    bisacodyl  10 mg Rectal Daily    [START ON 12/27/2021] aspirin  81 mg Oral Daily    famotidine  20 mg Oral Daily    enoxaparin  40 mg SubCUTAneous Daily    polyethylene glycol  17 g Oral BID    senna  2 tablet Oral Nightly    torsemide  20 mg Oral Daily    midodrine  2.5 mg Oral TID WC    sodium chloride flush  5-40 mL IntraVENous 2 times per day    [Held by provider] glipiZIDE  5 mg Oral QAM AC    [Held by provider] alogliptin  12.5 mg Oral Daily    carvedilol  6.25 mg Oral BID WC    atorvastatin  40 mg Oral Nightly    levothyroxine  150 mcg Oral Daily    insulin glargine  6 Units SubCUTAneous Nightly    lidocaine  3 patch Topical Daily    insulin lispro  0-6 Units SubCUTAneous TID WC    insulin lispro  0-3 Units SubCUTAneous Nightly     PRN Meds: magnesium citrate, acetaminophen, morphine **OR** [DISCONTINUED] morphine, sodium chloride flush, sodium chloride, ondansetron **OR** ondansetron, HYDROmorphone, benzocaine-menthol, fleet, cyclobenzaprine, HYDROcodone 5 mg - acetaminophen **OR** HYDROcodone 5 mg - acetaminophen, glucose, dextrose, glucagon oblique nondisplaced fracture through the mid and inferior aspects    of the T8 vertebral body. No significant height loss or retropulsion. Additional acute, oblique fracture through the T8 spinous process. Disruption of anterior longitudinal ligament calcification at the T8 level    and therefore ligamentous disruption cannot be excluded. This document has been electronically signed by: Juan Antonio Caputo MD on    12/18/2021 04:17 AM      All CTs at this facility use dose modulation techniques and iterative    reconstructions, and/or weight-based dosing   when appropriate to reduce radiation to a low as reasonably achievable. CT INTERPRETATION OF OUTSIDE IMAGES   Final Result   No acute process            **This report has been created using voice recognition software. It may contain minor errors which are inherent in voice recognition technology. **      Final report electronically signed by Dr. Wayne Pierre on 12/18/2021 1:00 AM      CT INTERPRETATION OF OUTSIDE IMAGES   Final Result   No acute process            **This report has been created using voice recognition software. It may contain minor errors which are inherent in voice recognition technology. **      Final report electronically signed by Dr. Wayne Pierre on 12/18/2021 12:56 AM      CT INTERPRETATION OF OUTSIDE IMAGES   Final Result   No acute process            **This report has been created using voice recognition software. It may contain minor errors which are inherent in voice recognition technology. **      Final report electronically signed by Dr. Wayne Pierre on 12/18/2021 12:59 AM      XR CHEST PORTABLE   Final Result   No acute disease            **This report has been created using voice recognition software. It may contain minor errors which are inherent in voice recognition technology. **      Final report electronically signed by Dr. Wayne Pierre on 12/18/2021 12:42 AM        XR CHEST PORTABLE    Result Date: 12/18/2021  PROCEDURE: XR CHEST PORTABLE CLINICAL INFORMATION: Trauma . TECHNIQUE: Portable semiupright COMPARISON: No prior study. FINDINGS: Heart and mediastinal and hilar contours are within normal limits. No infiltrates or effusions. Vessels are not congested left-sided AICD. EKG leads overlie the chest.     No acute disease **This report has been created using voice recognition software. It may contain minor errors which are inherent in voice recognition technology. ** Final report electronically signed by Dr. Fay Bah on 12/18/2021 12:42 AM    CT INTERPRETATION OF OUTSIDE IMAGES    Result Date: 12/18/2021  ** ADDENDUM #1 ** Receipt of this report by the clinical staff was confirmed with Annabella Arita RN on Dec 18, 2021 04:29:00 EST. This document has been electronically signed by: Marcos Bhandari on 12/18/2021 04:30 AM ** ORIGINAL REPORT ** CT thoracic spine without IV contrast. Comparison: None Findings: Normal alignment. Osteopenia. Acute oblique nondisplaced fracture through the mid and inferior aspects of the T8 vertebral body. No significant height loss or retropulsion. Additional acute, oblique fracture through the T8 spinous process. Disruption of anterior longitudinal ligament calcification at the T8 level and therefore ligamentous disruption cannot be excluded. Multilevel degenerative disc space narrowing and hypertrophic spurring. Prominent ligamentous calcifications. No high grade canal stenosis noted. No paraspinal soft tissue hematoma. Visualized lung fields without acute pathology. Moderate cardiomegaly. Dense atheromatous coronary vascular calcifications. Acute , oblique nondisplaced fracture through the mid and inferior aspects of the T8 vertebral body. No significant height loss or retropulsion. Additional acute, oblique fracture through the T8 spinous process.  Disruption of anterior longitudinal ligament calcification at the T8 level and therefore ligamentous disruption cannot be excluded. This document has been electronically signed by: Carrie Sanchez MD on 12/18/2021 04:17 AM All CTs at this facility use dose modulation techniques and iterative reconstructions, and/or weight-based dosing when appropriate to reduce radiation to a low as reasonably achievable. CT INTERPRETATION OF OUTSIDE IMAGES    Result Date: 12/18/2021  PROCEDURE: CT INTERPRETATION OF OUTSIDE IMAGES CLINICAL INFORMATION: trauma transfer . TECHNIQUE: 2-D multiplanar reconstructed images of the lumbar spine All CT scans at this facility use dose modulation, iterative reconstruction, and/or weight-based dosing when appropriate to reduce radiation dose to as low as reasonably achievable. COMPARISON: No prior study. FINDINGS: No acute fracture or acute alignment malalignment. Severe degenerative changes of the disks and marginal osteophytes. Bones are osteopenic. Incidental note is made of a distention of the urinary bladder partially imaged. No acute process **This report has been created using voice recognition software. It may contain minor errors which are inherent in voice recognition technology. ** Final report electronically signed by Dr. Junior Heaton on 12/18/2021 1:00 AM    CT INTERPRETATION OF OUTSIDE IMAGES    Result Date: 12/18/2021  PROCEDURE: CT INTERPRETATION OF OUTSIDE IMAGES CLINICAL INFORMATION: trauma transfer . TECHNIQUE: 2-D multiplanar noncontrast images of the cervical spine done at [facility] ([facility] All CT scans at this facility use dose modulation, iterative reconstruction, and/or weight-based dosing when appropriate to reduce radiation dose to as low as reasonably achievable. COMPARISON: No prior study. FINDINGS: No acute fracture or acute bony malalignment. Severe degenerative changes throughout. Osteopenia. No precervical soft tissue swelling. No acute process **This report has been created using voice recognition software.   It may contain minor errors which are inherent in voice recognition technology. ** Final report electronically signed by Dr. Ryanne Dumont on 12/18/2021 12:59 AM    CT INTERPRETATION OF OUTSIDE IMAGES    Result Date: 12/18/2021  PROCEDURE: CT INTERPRETATION OF OUTSIDE IMAGES CLINICAL INFORMATION: trauma transfer  . TECHNIQUE: 2-D multiplanar noncontrast images of the brain done at [facility] ([facility] All CT scans at this facility use dose modulation, iterative reconstruction, and/or weight-based dosing when appropriate to reduce radiation dose to as low as reasonably achievable. COMPARISON: No prior study. FINDINGS: Marked generalized cerebral volume loss. Ventricular megaly consistent with volume loss. No evidence of acute hemorrhage. No acute infarct seen. Calvarium is intact. Small air-fluid level in the right maxillary sinus postsurgical changes of the sinus. Radiopaque foreign body posterior to the right maxillary sinus postsurgical. Other visualized paranasal sinuses and mastoid is clear. No acute process **This report has been created using voice recognition software. It may contain minor errors which are inherent in voice recognition technology. ** Final report electronically signed by Dr. Ryanne Dumont on 12/18/2021 12:56 AM      DVT prophylaxis: [x] Lovenox                                 [] SCDs                                 [] SQ Heparin                                 [] Encourage ambulation           [] Already on Anticoagulation     Code Status: Full Code    Tele:   [] yes             [x] no    Electronically signed by Annette Rodriguez DO on 12/26/2021 at 2:55 PM

## 2021-12-27 LAB
GLUCOSE BLD-MCNC: 110 MG/DL (ref 70–108)
GLUCOSE BLD-MCNC: 148 MG/DL (ref 70–108)
GLUCOSE BLD-MCNC: 155 MG/DL (ref 70–108)
GLUCOSE BLD-MCNC: 173 MG/DL (ref 70–108)

## 2021-12-27 PROCEDURE — 6370000000 HC RX 637 (ALT 250 FOR IP): Performed by: PHYSICIAN ASSISTANT

## 2021-12-27 PROCEDURE — APPSS60 APP SPLIT SHARED TIME 46-60 MINUTES: Performed by: NURSE PRACTITIONER

## 2021-12-27 PROCEDURE — 97116 GAIT TRAINING THERAPY: CPT

## 2021-12-27 PROCEDURE — 6370000000 HC RX 637 (ALT 250 FOR IP): Performed by: NURSE PRACTITIONER

## 2021-12-27 PROCEDURE — 6370000000 HC RX 637 (ALT 250 FOR IP): Performed by: INTERNAL MEDICINE

## 2021-12-27 PROCEDURE — 1200000000 HC SEMI PRIVATE

## 2021-12-27 PROCEDURE — 82948 REAGENT STRIP/BLOOD GLUCOSE: CPT

## 2021-12-27 PROCEDURE — 99024 POSTOP FOLLOW-UP VISIT: CPT | Performed by: SURGERY

## 2021-12-27 PROCEDURE — 99232 SBSQ HOSP IP/OBS MODERATE 35: CPT | Performed by: NURSE PRACTITIONER

## 2021-12-27 PROCEDURE — 97110 THERAPEUTIC EXERCISES: CPT

## 2021-12-27 PROCEDURE — 6360000002 HC RX W HCPCS: Performed by: PHYSICIAN ASSISTANT

## 2021-12-27 RX ORDER — SENNA PLUS 8.6 MG/1
4 TABLET ORAL NIGHTLY
Status: DISCONTINUED | OUTPATIENT
Start: 2021-12-27 | End: 2021-12-31 | Stop reason: HOSPADM

## 2021-12-27 RX ADMIN — CYCLOBENZAPRINE 10 MG: 10 TABLET, FILM COATED ORAL at 23:08

## 2021-12-27 RX ADMIN — CARVEDILOL 6.25 MG: 6.25 TABLET, FILM COATED ORAL at 17:02

## 2021-12-27 RX ADMIN — ENOXAPARIN SODIUM 40 MG: 100 INJECTION SUBCUTANEOUS at 10:15

## 2021-12-27 RX ADMIN — INSULIN LISPRO 1 UNITS: 100 INJECTION, SOLUTION INTRAVENOUS; SUBCUTANEOUS at 13:02

## 2021-12-27 RX ADMIN — CARVEDILOL 6.25 MG: 6.25 TABLET, FILM COATED ORAL at 10:15

## 2021-12-27 RX ADMIN — FAMOTIDINE 20 MG: 20 TABLET ORAL at 10:15

## 2021-12-27 RX ADMIN — HYDROCODONE BITARTRATE AND ACETAMINOPHEN 1 TABLET: 5; 325 TABLET ORAL at 23:06

## 2021-12-27 RX ADMIN — BISACODYL 10 MG: 10 SUPPOSITORY RECTAL at 21:58

## 2021-12-27 RX ADMIN — POLYETHYLENE GLYCOL 3350 17 G: 17 POWDER, FOR SOLUTION ORAL at 21:58

## 2021-12-27 RX ADMIN — SENNOSIDES 34.4 MG: 8.6 TABLET, COATED ORAL at 21:58

## 2021-12-27 RX ADMIN — ACETAMINOPHEN 650 MG: 325 TABLET ORAL at 21:58

## 2021-12-27 RX ADMIN — MAGNESIUM CITRATE 148 ML: 1.75 LIQUID ORAL at 13:06

## 2021-12-27 RX ADMIN — MIDODRINE HYDROCHLORIDE 2.5 MG: 2.5 TABLET ORAL at 10:15

## 2021-12-27 RX ADMIN — ATORVASTATIN CALCIUM 40 MG: 40 TABLET, FILM COATED ORAL at 21:58

## 2021-12-27 RX ADMIN — INSULIN GLARGINE 6 UNITS: 100 INJECTION, SOLUTION SUBCUTANEOUS at 23:17

## 2021-12-27 RX ADMIN — MIDODRINE HYDROCHLORIDE 2.5 MG: 2.5 TABLET ORAL at 17:02

## 2021-12-27 RX ADMIN — LEVOTHYROXINE SODIUM 150 MCG: 0.15 TABLET ORAL at 05:55

## 2021-12-27 RX ADMIN — INSULIN LISPRO 1 UNITS: 100 INJECTION, SOLUTION INTRAVENOUS; SUBCUTANEOUS at 23:16

## 2021-12-27 RX ADMIN — MIDODRINE HYDROCHLORIDE 2.5 MG: 2.5 TABLET ORAL at 13:04

## 2021-12-27 RX ADMIN — ACETAMINOPHEN 650 MG: 325 TABLET ORAL at 06:07

## 2021-12-27 RX ADMIN — CYCLOBENZAPRINE 10 MG: 10 TABLET, FILM COATED ORAL at 02:10

## 2021-12-27 RX ADMIN — MAGNESIUM CITRATE 148 ML: 1.75 LIQUID ORAL at 15:33

## 2021-12-27 RX ADMIN — HYDROCODONE BITARTRATE AND ACETAMINOPHEN 1 TABLET: 5; 325 TABLET ORAL at 02:10

## 2021-12-27 RX ADMIN — ASPIRIN 81 MG: 81 TABLET, COATED ORAL at 10:15

## 2021-12-27 RX ADMIN — POLYETHYLENE GLYCOL 3350 17 G: 17 POWDER, FOR SOLUTION ORAL at 10:42

## 2021-12-27 RX ADMIN — TORSEMIDE 20 MG: 20 TABLET ORAL at 10:15

## 2021-12-27 ASSESSMENT — PAIN SCALES - GENERAL
PAINLEVEL_OUTOF10: 6
PAINLEVEL_OUTOF10: 8
PAINLEVEL_OUTOF10: 6
PAINLEVEL_OUTOF10: 4

## 2021-12-27 ASSESSMENT — PAIN DESCRIPTION - PROGRESSION

## 2021-12-27 ASSESSMENT — PAIN DESCRIPTION - LOCATION: LOCATION: BACK

## 2021-12-27 ASSESSMENT — PAIN DESCRIPTION - ONSET: ONSET: ON-GOING

## 2021-12-27 NOTE — PROGRESS NOTES
[facility] - Dr. Basia Marquez covering Dr. Tere Sawant  Daily Progress Note  Pt Name: Mian Ruth Record Number: [id number]  Date of Birth 10/25/1932   Today's Date: 12/27/2021    HD: # 9    CC: Back pain     ASSESSMENT  1. Active Hospital Problems    Diagnosis Date Noted    Fall at home [H22. Maryuri Farris, E72.447] 12/18/2021    Closed fracture of eighth thoracic vertebra [facility]) [S22.069A] 12/18/2021       PROCEDURES  12/21/21 - Kyphoplasty     PLAN  Admit to Trauma Services     Trauma by fall              - PT and OT to continue               - Fall precautions     T8 vertebral body fracture with anterior longitudinal ligament disruption   T6, T7 & T8 spinous process fractures              - Neurosurgery assisting with management              - Activity as tolerated              - MRIs of the spine are unable to be completed as pacer/defib not compatible               - Neuro checks              - Maintain spinal neutrality              - Pain control   - S/p kyphoplasty 12/21 with Dr. Tegan Duffy   - [facility] consulted PM&R and signed off 12/22    Hyperkalemia - resolved   -Treated with Kayexalate   -Repeat CMP 12/25, K+ 4.7    Acute metabolic encephalopathy   - Stroke alert called 12/24   - Neurology saw, CT head negative, stating likely metabolic encephalopathy   - Started Aspirin 325 mg daily    - Hospitalist following for medical management    Constipation - improving    - No documented bowel movements until 12/26 - but small   - Denied abdominal pain, nausea, or vomiting. Tolerating diet   - On senna nightly and gylcolax BID.  Fleet enema ordered prn    - Fleet enema 12/25   - Dulcolax suppository daily     Hx of HTN, A-fib, DM2, CHF              - Hospitalist & cardiology assisting with management and surgical clearance     Pain control                   - Norco. Lidocaine, Morphine PRN     General diet, carb controlled  Repeat labs stable  Prophylaxis: SCDs, IS, C&DB, Pepcid, stool softeners, Lovenox   PT, OT, SLP eval and treat  Discharge disposition pending clinical course   -Patient stable from trauma perspective   -Precert initiated to IPR, discharge/readmit when pre-cert returns     SUBJECTIVE  Patient seen on 8E this morning. Patient stated he was doing good, better than before. States that he has some back pain and thinks that he may have slept on his call light last night. He is up in the chair at the time of rounds. He reports that he has not had a bowel movement since admission, however nursing reports that he had a small, mucousy bowel movement yesterday. States that he had a better appetite for breakfast this morning. Denies any nausea or vomiting. He is tolerating therapy. Patient stable from a trauma surgery perspective, awaiting pre-cert to [facility]. Case discussed with trauma surgeon, Dr. Nick Courtney.         Wt Readings from Last 3 Encounters:   12/27/21 209 lb (94.8 kg)   08/11/16 209 lb (94.8 kg)   02/16/16 209 lb 12.8 oz (95.2 kg)     Temp Readings from Last 3 Encounters:   12/26/21 97.8 °F (36.6 °C) (Oral)   02/16/16 96.4 °F (35.8 °C)     BP Readings from Last 3 Encounters:   12/26/21 125/68   12/21/21 (!) 104/54   02/16/16 134/84     Pulse Readings from Last 3 Encounters:   12/26/21 79   02/16/16 72   09/09/13 92       24 HR INTAKE/OUTPUT :     Intake/Output Summary (Last 24 hours) at 12/27/2021 0732  Last data filed at 12/27/2021 4346  Gross per 24 hour   Intake 200 ml   Output --   Net 200 ml     ADULT DIET;  Regular; 4 carb choices (60 gm/meal)    OBJECTIVE  CURRENT VITALS /68   Pulse 79   Temp 97.8 °F (36.6 °C) (Oral)   Resp 17   Ht 6' 1\" (1.854 m)   Wt 209 lb (94.8 kg)   SpO2 97%   BMI 27.57 kg/m²   GENERAL: Awake, alert, no acute distress, pleasant and cooperative with exam  HEENT: Normocephalic, pupils equal and reactive to light, nares patent bilaterally  NEURO: Alert and orient x3, GCS 15, follows commands, PMS intact in all four extremities, no signs of focal neurological deficits  CSPINE/BACK: No midline cervical tenderness to palpation  HEART: Regular rate and rhythm with no obvious murmurs, rubs, gallops. Distal pulses intact. LUNGS/CHEST WALL: Lungs are clear to auscultation bilaterally with no wheezes, rales, rhonchi. No respiratory distress or increased work of breathing. No chest wall tenderness to palpation. ABDOMEN: Abdomen soft, mildly distended, with no tenderness to palpation. No guarding or peritoneal signs. Bowel sounds hypoactive  EXTREMITIES: No cyanosis or edema. PMS intact in all four extremities. No extremity tenderness to palpation. Range of motion intact. Strength 5/5 bilaterally with  strength and plantar/dorsiflexion. SKIN: Warm and dry    LABS  CBC :   Recent Labs     12/25/21  0733   WBC 5.7   HGB 11.2*   HCT 34.7*   MCV 98.0*        BMP:   Recent Labs     12/25/21  0733      K 4.7      CO2 23   BUN 25*   CREATININE 1.2     COAGS:   Recent Labs     12/25/21  0733   PROT 5.6*     Pancreas/HFP:  No results for input(s): LIPASE, AMYLASE in the last 72 hours. Recent Labs     12/25/21  0733   AST 16   ALT 7*   BILITOT 0.5   ALKPHOS 114     RADIOLOGY:  No new results    Electronically signed by MESSI Richards - CNP on 12/27/2021 at 7:32 AM    Patient seen and examined independently by me early this AM. Above discussed and I agree with Nica Brewster CNP. See my additional comments below for updated orders and plan. Labs, cultures, and radiographs where available were reviewed. I discussed patient concerns with the patient's nurse and instructions were given. Please see our orders for the updated patient care plan. -Nonoperative management by neurosurgery. Following up as outpatient. Stool softeners/enema/suppository bowel regimen. PT/OT. Speech therapy. Stable from a trauma standpoint. Precertification initiated to inpatient rehab. Awaiting precertification completion. Continue current care.     Electronically signed by Aden Gomez MD on 12/27/21 at 11:50 AM EST

## 2021-12-27 NOTE — PLAN OF CARE
Problem: Pain:  Goal: Pain level will decrease  Description: Pain level will decrease  Outcome: Ongoing  Note: Pt report pain at 0 on scale. Problem: Falls - Risk of:  Goal: Will remain free from falls  Description: Will remain free from falls  12/26/2021 1933 by Navjot Perez RN  Outcome: Ongoing  Note: Pt using call light appropriately to call for assistance. Pt reports understanding of fall prevention when discussed. Problem: Falls - Risk of:  Goal: Absence of physical injury  Description: Absence of physical injury  Outcome: Ongoing  Note: No s/s of physical injury. Problem: Skin Integrity:  Goal: Will show no infection signs and symptoms  Description: Will show no infection signs and symptoms  12/26/2021 1933 by Navjot Perez RN  Outcome: Ongoing  Note: No s/s of infection noted at this time. Care plan reviewed with patient. Patient verbalize understanding of the plan of care and contribute to goal setting.

## 2021-12-27 NOTE — PROGRESS NOTES
[address] [address]  INPATIENT PHYSICAL THERAPY  DAILY NOTE  STRZ SURGE OVERFLOW - 8E-70/8E-70A    Time In: 08  Time Out: 991  Timed Code Treatment Minutes: 27 Minutes  Minutes: 27          Date: 2021  Patient Name: Taitana Oliva,  Gender:  male        MRN: [id number]  : 10/25/1932  (80 y.o.)     Referring Practitioner: Xi Yañez PA-C  Diagnosis: fall at home  Additional Pertinent Hx: Per EMR Tyron Mancuso is an 80year old male presenting to the Emergency Department as a transfer in from [facility] for evaluation and treatment of a T8 vertebral body fracture from a fall sustained 3 days prior. He reports that he was experiencing increasing back pain so he went to the [facility] ER for evaluation where they found a fracture of the T8 vertebral body. He denies hitting his head and denies loss of consciousness. He denies any numbness or tingling, no loss of bowel or bladder control. Only complaint is neck and back pain. \" Pt is s/p Kyphoplasty and cement augmentation for acute osteoporotic ( age related) compression fracture at the level T8 completed by Dr. Dian Lan on . Prior Level of Function:  Lives With: Spouse  Type of Home: House  Home Layout: One level  Home Access: Level entry (threshhold is 1 or 2 inches)  Home Equipment: Rolling walker,Cane   Bathroom Shower/Tub: Walk-in shower,Shower chair with back  Bathroom Toilet: Standard  Bathroom Accessibility: Accessible    Receives Help From: Family  ADL Assistance: Needs assistance  Homemaking Assistance: Needs assistance  Homemaking Responsibilities: No  Ambulation Assistance: Independent  Transfer Assistance: Independent  Active : No  Additional Comments: Pt had been using a rolling walker at home. He does his own showering but has help with dressing. He has had help from spouse with managing medications.   Per family, pt frequently loses his hearing aides and had to have help to locate them.    Restrictions/Precautions:  Restrictions/Precautions: Fall Risk,General Precautions  Position Activity Restriction  Other position/activity restrictions: avoid excessive trunk flexion     SUBJECTIVE: RN approved session. Pt laying in bed, pleasant and agreeable to therapy. Pt left sitting up in chair with call light in reach, and chair alarm on. PAIN: does not rate but does c/o some back pain     Vitals: Vitals not assessed per clinical judgement, see nursing flowsheet    OBJECTIVE:  Bed Mobility:  Supine to Sit: Minimal Assistance, X 1, with head of bed flat, with rail, with increased time for completion    Transfers:  Sit to Stand: [address], cues for hand placement  Stand to Sit:Contact Guard Assistance    Ambulation:  Contact Guard Assistance  Distance: 12' x 2   Surface: Level Tile  Device:Rolling Walker  Gait Deviations: Forward Flexed Posture, Decreased Step Length Bilaterally, Wide Base of Support and Mild Path Deviations    Balance:  Static Sitting Balance:  Supervision  Static Standing Balance: Stand By Assistance    Exercise:  Patient was guided in 1 set(s) 15 reps of exercise to both lower extremities. Ankle pumps, Glut sets, Quad sets, Heelslides, Hip abduction/adduction, Straight leg raises, Seated marches, Seated hamstring curls, Seated heel/toe raises, Long arc quads and Seated abduction/adduction. Exercises were completed for increased independence with functional mobility. Functional Outcome Measures: Completed  -PAC Inpatient Mobility Raw Score : 16  AM-PAC Inpatient T-Scale Score : 40.78    ASSESSMENT:  Assessment: Patient progressing toward established goals. Activity Tolerance:  Patient tolerance of  treatment: good.       Equipment Recommendations:Equipment Needed: No  Discharge Recommendations: Inpatient Therapy Stay  Plan: Times per week: 6x O/T  Current Treatment Recommendations: Strengthening,Neuromuscular Re-education,Home Exercise Program,Safety Education & [facility]andi Heal Training,Patient/Caregiver Education & Training,Functional Mobility Training,Equipment Evaluation, Education, & procurement,Transfer Training,Gait Training,Stair training    Patient Education  Patient Education: [employer], Transfers, Gait, Verbal Exercise Instruction, Health Promotion and Wellness Education    Goals:  Patient goals : intpatient rehab and then return home  Short term goals  Time Frame for Short term goals: by discharge  Short term goal 1: Pt will amb with RW with S for 50 feet to [facility] distances. Short term goal 2: Pt will demo S for transfers with good safety and RW for support to progress towards PLOF. Short term goal 3: Pt will demo IND with bed mobility tasks with bed flat to progress towards PLOF safely. Short term goal 4: Pt will negotiate a step for small threshold into home with SBA for safety to progress with mobility. Long term goals  Time Frame for Long term goals : NA due to short ELOS    Following session, patient left in safe position with all fall risk precautions in place.     Aubrey Abrams PT, DPT

## 2021-12-27 NOTE — PLAN OF CARE
Problem: Falls - Risk of:  Goal: Will remain free from falls  Description: Will remain free from falls  Outcome: Ongoing   Patient is alert and oriented and has demonstrated the use of using the call light for assistance before getting up. Education has been provided to defer the use of the bed alarm and/or restraint free alarm as the patient has shown competency in calling for assistance and verbalizing the risk. Problem: Skin Integrity:  Goal: Will show no infection signs and symptoms  Description: Will show no infection signs and symptoms  Outcome: Ongoing   Continuing to assess and monitor.

## 2021-12-27 NOTE — PROGRESS NOTES
Hospitalist Progress Note    Patient:  Romayne Calandra      Unit/Bed:8E-70/8E-70A    YOB: 1932    MRN: [id number]       Acct: [de-identified]     PCP: MESSI Aquino    Date of Admission: 12/18/2021    Assessment/Plan:    1. Fall with T8 fracture status post kyphoplasty on December 21, 2021--continue PT/OT; awaiting possible discharge readmit to [facility] when precertification returns  2. Hyperkalemia--resolved; check in the morning  3. CKD, unsure baseline--improved, monitor; creatinine at 1.2 on December 25  4. Constipation--treated with meds, I gave the patient apple juice and food services is going to bring up prune juice  5. Essential hypertension, controlled--midodrine being weaned down  6. Atrial fibrillation, chronic--on Coreg, not on home amiodarone  7. Acute metabolic encephalopathy--ammonia level was normal on December 25; he is currently fully alert and oriented, resolved, question his baseline    Expected discharge date: pending clinical course    Disposition:    [] Home       [] TCU       [x] Rehab       [] Psych       [] SNF       [] [place]       [] Other-    Chief Complaint: Fall at home    Hospital Course: Per progress note dated 12/26: Alyson Luna to today) \"He is a 80 y. o. male who continues to present at [facility]. [facility] on 5[facility] following a fall.  Patient states tolerating kyphoplasty, no significant pain. Patient denies chest pain, shortness of breath, cough, headache, dizziness, lightheadedness, numbness, paraesthesias, weakness, chills, fevers, abdominal pain, nausea, vomiting,neck pain, or back pain.  Nursing staff states patient hyperkalemic at 5.5, Kayexalate ordered. Vital signs stable on exam. Care in coordination with trauma surgeon Kranthi Jones. \"    12/27--> hemodynamically stable, afebrile, he is alert and oriented, eating     Subjective (past 24 hours): States his mid back pain is much better, states he needs to have a bowel movement    Medications: Reviewed    Infusion Medications    sodium chloride      dextrose       Scheduled Medications    senna  4 tablet Oral Nightly    bisacodyl  10 mg Rectal Daily    aspirin  81 mg Oral Daily    famotidine  20 mg Oral Daily    enoxaparin  40 mg SubCUTAneous Daily    polyethylene glycol  17 g Oral BID    torsemide  20 mg Oral Daily    midodrine  2.5 mg Oral TID     sodium chloride flush  5-40 mL IntraVENous 2 times per day    [Held by provider] glipiZIDE  5 mg Oral QAM AC    [Held by provider] alogliptin  12.5 mg Oral Daily    carvedilol  6.25 mg Oral BID WC    atorvastatin  40 mg Oral Nightly    levothyroxine  150 mcg Oral Daily    insulin glargine  6 Units SubCUTAneous Nightly    lidocaine  3 patch Topical Daily    insulin lispro  0-6 Units SubCUTAneous TID WC    insulin lispro  0-3 Units SubCUTAneous Nightly     PRN Meds: magnesium citrate, acetaminophen, morphine **OR** [DISCONTINUED] morphine, sodium chloride flush, sodium chloride, ondansetron **OR** ondansetron, HYDROmorphone, benzocaine-menthol, fleet, cyclobenzaprine, HYDROcodone 5 mg - acetaminophen **OR** HYDROcodone 5 mg - acetaminophen, glucose, dextrose, glucagon (rDNA), dextrose      Intake/Output Summary (Last 24 hours) at 12/27/2021 1219  Last data filed at 12/27/2021 9177  Gross per 24 hour   Intake 200 ml   Output --   Net 200 ml       Diet:  ADULT DIET; Regular; 4 carb choices (60 gm/meal)    Exam:  /80   Pulse 94   Temp 98.3 °F (36.8 °C) (Oral)   Resp 16   Ht 6' 1\" (1.854 m)   Wt 209 lb (94.8 kg)   SpO2 95%   BMI 27.57 kg/m²     General appearance: No apparent distress, appears stated age and cooperative. HEENT: Pupils equal, round, and reactive to light. Conjunctivae/corneas clear. Neck: Supple, with full range of motion. No jugular venous distention. Trachea midline. Respiratory:  Normal respiratory effort. Clear to auscultation, bilaterally without Rales/Wheezes/Rhonchi.   Cardiovascular: Regular rate and rhythm with normal S1/S2 without murmurs, rubs or gallops. Abdomen: Soft, non-tender, slightly distended with normal bowel sounds. Musculoskeletal: passive and active ROM x 4 extremities. Skin: Skin color, texture, turgor normal.    Neurologic:  Neurovascularly intact without any focal sensory/motor deficits. Cranial nerves: II-XII intact, grossly non-focal.  Psychiatric: Alert and oriented, thought content appropriate  Capillary Refill: Brisk,< 3 seconds   Peripheral Pulses: +2 palpable, equal bilaterally     Labs:   Recent Labs     12/25/21  0733   WBC 5.7   HGB 11.2*   HCT 34.7*        Recent Labs     12/25/21  0733      K 4.7      CO2 23   BUN 25*   CREATININE 1.2   CALCIUM 8.4*     Recent Labs     12/25/21  0733   AST 16   ALT 7*   BILITOT 0.5   ALKPHOS 114     Microbiology:    None    Urinalysis:      Lab Results   Component Value Date    NITRU NEGATIVE 12/24/2021    WBCUA 0-2 12/24/2021    BACTERIA NONE SEEN 12/24/2021    RBCUA 0-2 12/24/2021    BLOODU NEGATIVE 12/24/2021    GLUCOSEU NEGATIVE 12/24/2021       Radiology:  XR CHEST PORTABLE    Result Date: 12/18/2021  PROCEDURE: XR CHEST PORTABLE CLINICAL INFORMATION: Trauma . TECHNIQUE: Portable semiupright COMPARISON: No prior study. FINDINGS: Heart and mediastinal and hilar contours are within normal limits. No infiltrates or effusions. Vessels are not congested left-sided AICD. EKG leads overlie the chest.     No acute disease **This report has been created using voice recognition software. It may contain minor errors which are inherent in voice recognition technology. ** Final report electronically signed by Dr. Javed Webster on 12/18/2021 12:42 AM    CT INTERPRETATION OF OUTSIDE IMAGES    Result Date: 12/18/2021   ADDENDUM #1  Receipt of this report by the clinical staff was confirmed with Rani Beach RN on Dec 18, 2021 04:29:00 EST.  This document has been electronically signed by: Jason Ruth on 12/18/2021 04:30 AM  ORIGINAL REPORT CT thoracic spine without IV contrast. Comparison: None Findings: Normal alignment. Osteopenia. Acute oblique nondisplaced fracture through the mid and inferior aspects of the T8 vertebral body. No significant height loss or retropulsion. Additional acute, oblique fracture through the T8 spinous process. Disruption of anterior longitudinal ligament calcification at the T8 level and therefore ligamentous disruption cannot be excluded. Multilevel degenerative disc space narrowing and hypertrophic spurring. Prominent ligamentous calcifications. No high grade canal stenosis noted. No paraspinal soft tissue hematoma. Visualized lung fields without acute pathology. Moderate cardiomegaly. Dense atheromatous coronary vascular calcifications. Acute , oblique nondisplaced fracture through the mid and inferior aspects of the T8 vertebral body. No significant height loss or retropulsion. Additional acute, oblique fracture through the T8 spinous process. Disruption of anterior longitudinal ligament calcification at the T8 level and therefore ligamentous disruption cannot be excluded. This document has been electronically signed by: Brianna Paris MD on 12/18/2021 04:17 AM All CTs at this facility use dose modulation techniques and iterative reconstructions, and/or weight-based dosing when appropriate to reduce radiation to a low as reasonably achievable. CT INTERPRETATION OF OUTSIDE IMAGES    Result Date: 12/18/2021  PROCEDURE: CT INTERPRETATION OF OUTSIDE IMAGES CLINICAL INFORMATION: trauma transfer . TECHNIQUE: 2-D multiplanar reconstructed images of the lumbar spine All CT scans at this facility use dose modulation, iterative reconstruction, and/or weight-based dosing when appropriate to reduce radiation dose to as low as reasonably achievable. COMPARISON: No prior study. FINDINGS: No acute fracture or acute alignment malalignment. Severe degenerative changes of the disks and marginal osteophytes.  Bones are sinus postsurgical. Other visualized paranasal sinuses and mastoid is clear. No acute process **This report has been created using voice recognition software. It may contain minor errors which are inherent in voice recognition technology. ** Final report electronically signed by Dr. Segundo Paiz on 12/18/2021 12:56 AM      DVT prophylaxis: [x] Lovenox                                 [] SCDs                                 [] SQ Heparin                                 [] Encourage ambulation           [] Already on Anticoagulation     Code Status: Full Code    PT/OT Eval Status: On case    Tele:   [] yes             [x] no    Active Hospital Problems    Diagnosis Date Noted    Fall at home [Q90. Johann Bautista, D45.681] 12/18/2021    Closed fracture of eighth thoracic vertebra [facility]) Tim Steele 12/18/2021       Electronically signed by MESSI Fernandez CNP on 12/27/2021 at 12:19 PM

## 2021-12-28 LAB
ANION GAP SERPL CALCULATED.3IONS-SCNC: 10 MEQ/L (ref 8–16)
BUN BLDV-MCNC: 26 MG/DL (ref 7–22)
CALCIUM SERPL-MCNC: 8.9 MG/DL (ref 8.5–10.5)
CHLORIDE BLD-SCNC: 99 MEQ/L (ref 98–111)
CO2: 27 MEQ/L (ref 23–33)
CREAT SERPL-MCNC: 1.6 MG/DL (ref 0.4–1.2)
GFR SERPL CREATININE-BSD FRML MDRD: 41 ML/MIN/1.73M2
GLUCOSE BLD-MCNC: 101 MG/DL (ref 70–108)
GLUCOSE BLD-MCNC: 108 MG/DL (ref 70–108)
GLUCOSE BLD-MCNC: 132 MG/DL (ref 70–108)
GLUCOSE BLD-MCNC: 133 MG/DL (ref 70–108)
GLUCOSE BLD-MCNC: 92 MG/DL (ref 70–108)
POTASSIUM SERPL-SCNC: 4.8 MEQ/L (ref 3.5–5.2)
SODIUM BLD-SCNC: 136 MEQ/L (ref 135–145)

## 2021-12-28 PROCEDURE — 6370000000 HC RX 637 (ALT 250 FOR IP): Performed by: INTERNAL MEDICINE

## 2021-12-28 PROCEDURE — 99232 SBSQ HOSP IP/OBS MODERATE 35: CPT | Performed by: INTERNAL MEDICINE

## 2021-12-28 PROCEDURE — 6370000000 HC RX 637 (ALT 250 FOR IP): Performed by: PHYSICIAN ASSISTANT

## 2021-12-28 PROCEDURE — 97530 THERAPEUTIC ACTIVITIES: CPT

## 2021-12-28 PROCEDURE — 6360000002 HC RX W HCPCS: Performed by: PHYSICIAN ASSISTANT

## 2021-12-28 PROCEDURE — 6370000000 HC RX 637 (ALT 250 FOR IP): Performed by: NURSE PRACTITIONER

## 2021-12-28 PROCEDURE — 36415 COLL VENOUS BLD VENIPUNCTURE: CPT

## 2021-12-28 PROCEDURE — 2580000003 HC RX 258: Performed by: PHYSICIAN ASSISTANT

## 2021-12-28 PROCEDURE — 1200000000 HC SEMI PRIVATE

## 2021-12-28 PROCEDURE — 82948 REAGENT STRIP/BLOOD GLUCOSE: CPT

## 2021-12-28 PROCEDURE — APPSS45 APP SPLIT SHARED TIME 31-45 MINUTES: Performed by: PHYSICIAN ASSISTANT

## 2021-12-28 PROCEDURE — 97535 SELF CARE MNGMENT TRAINING: CPT

## 2021-12-28 PROCEDURE — 80048 BASIC METABOLIC PNL TOTAL CA: CPT

## 2021-12-28 PROCEDURE — 99024 POSTOP FOLLOW-UP VISIT: CPT | Performed by: SURGERY

## 2021-12-28 PROCEDURE — 97116 GAIT TRAINING THERAPY: CPT

## 2021-12-28 RX ADMIN — MIDODRINE HYDROCHLORIDE 2.5 MG: 2.5 TABLET ORAL at 12:54

## 2021-12-28 RX ADMIN — ASPIRIN 81 MG: 81 TABLET, COATED ORAL at 09:00

## 2021-12-28 RX ADMIN — ALOGLIPTIN 12.5 MG: 12.5 TABLET, FILM COATED ORAL at 09:00

## 2021-12-28 RX ADMIN — INSULIN GLARGINE 6 UNITS: 100 INJECTION, SOLUTION SUBCUTANEOUS at 22:55

## 2021-12-28 RX ADMIN — MIDODRINE HYDROCHLORIDE 2.5 MG: 2.5 TABLET ORAL at 16:56

## 2021-12-28 RX ADMIN — SENNOSIDES 34.4 MG: 8.6 TABLET, COATED ORAL at 22:40

## 2021-12-28 RX ADMIN — ATORVASTATIN CALCIUM 40 MG: 40 TABLET, FILM COATED ORAL at 22:37

## 2021-12-28 RX ADMIN — MIDODRINE HYDROCHLORIDE 2.5 MG: 2.5 TABLET ORAL at 09:00

## 2021-12-28 RX ADMIN — FAMOTIDINE 20 MG: 20 TABLET ORAL at 09:00

## 2021-12-28 RX ADMIN — SODIUM CHLORIDE, PRESERVATIVE FREE 10 ML: 5 INJECTION INTRAVENOUS at 22:40

## 2021-12-28 RX ADMIN — CARVEDILOL 6.25 MG: 6.25 TABLET, FILM COATED ORAL at 16:56

## 2021-12-28 RX ADMIN — BISACODYL 10 MG: 10 SUPPOSITORY RECTAL at 22:54

## 2021-12-28 RX ADMIN — POLYETHYLENE GLYCOL 3350 17 G: 17 POWDER, FOR SOLUTION ORAL at 22:55

## 2021-12-28 RX ADMIN — TORSEMIDE 20 MG: 20 TABLET ORAL at 09:00

## 2021-12-28 RX ADMIN — CARVEDILOL 6.25 MG: 6.25 TABLET, FILM COATED ORAL at 09:00

## 2021-12-28 RX ADMIN — LEVOTHYROXINE SODIUM 150 MCG: 0.15 TABLET ORAL at 06:16

## 2021-12-28 RX ADMIN — POLYETHYLENE GLYCOL 3350 17 G: 17 POWDER, FOR SOLUTION ORAL at 09:00

## 2021-12-28 RX ADMIN — ENOXAPARIN SODIUM 40 MG: 100 INJECTION SUBCUTANEOUS at 09:00

## 2021-12-28 NOTE — PROGRESS NOTES
Hospitalist Progress Note    Patient:  Claude Rattler      Unit/Bed:8E-70/8E-70A    YOB: 1932    MRN: [id number]       Acct: [de-identified]     PCP: Hamilton Gowers, APRN    Date of Admission: 12/18/2021    Assessment/Plan:    1. Fall with T8 fracture status post kyphoplasty   -continue PT/OT; awaiting IPR, precert denied but plan for [id number]    2. Hyperkalemia--resolved    3. CKD, unsure baseline--improved, monitor; creatinine at 1.2 on December 25    4. Constipation--treated with meds, I gave the patient apple juice and food services is going to bring up prune juice    5. Essential hypertension, controlled--midodrine being weaned down    6. Atrial fibrillation, chronic--on Coreg, not on home amiodarone    7. Acute metabolic encephalopathy-resolved, at baseline. Will see PRN. Please call if need any further assistance    Expected discharge date: pending clinical course    Disposition:    [] Home       [] TCU       [x] Rehab       [] Psych       [] SNF       [] [place]       [] Other-    Chief Complaint: Fall at home    Hospital Course: Per progress note dated 12/26: Dock Reining to today) \"He is a 80 y. o. male who continues to present at [facility]. Christina's on 5K following a fall.  Patient states tolerating kyphoplasty, no significant pain. Patient denies chest pain, shortness of breath, cough, headache, dizziness, lightheadedness, numbness, paraesthesias, weakness, chills, fevers, abdominal pain, nausea, vomiting,neck pain, or back pain.  Nursing staff states patient hyperkalemic at 5.5, Kayexalate ordered. Vital signs stable on exam. Care in coordination with trauma surgeon [facility]. \"    12/27--> hemodynamically stable, afebrile, he is alert and oriented, eating     Subjective (past 24 hours):   Denies any complaints. Still feels weak in his legs. No acute events overnight. No nausea vomiting diarrhea. No chest pain or shortness of breath.     Medications: without murmurs, rubs or gallops. Abdomen: Soft, non-tender, slightly distended with normal bowel sounds. Musculoskeletal: passive and active ROM x 4 extremities. Skin: Skin color, texture, turgor normal.    Neurologic:  Neurovascularly intact without any focal sensory/motor deficits. Cranial nerves: II-XII intact, grossly non-focal.  Psychiatric: Alert and oriented, thought content appropriate  Capillary Refill: Brisk,< 3 seconds   Peripheral Pulses: +2 palpable, equal bilaterally     Labs:   No results for input(s): WBC, HGB, HCT, PLT in the last 72 hours. Recent Labs     12/28/21  0744      K 4.8   CL 99   CO2 27   BUN 26*   CREATININE 1.6*   CALCIUM 8.9     No results for input(s): AST, ALT, BILIDIR, BILITOT, ALKPHOS in the last 72 hours. Microbiology:    None    Urinalysis:      Lab Results   Component Value Date    NITRU NEGATIVE 12/24/2021    WBCUA 0-2 12/24/2021    BACTERIA NONE SEEN 12/24/2021    RBCUA 0-2 12/24/2021    BLOODU NEGATIVE 12/24/2021    GLUCOSEU NEGATIVE 12/24/2021       Radiology:  XR CHEST PORTABLE    Result Date: 12/18/2021  PROCEDURE: XR CHEST PORTABLE CLINICAL INFORMATION: Trauma . TECHNIQUE: Portable semiupright COMPARISON: No prior study. FINDINGS: Heart and mediastinal and hilar contours are within normal limits. No infiltrates or effusions. Vessels are not congested left-sided AICD. EKG leads overlie the chest.     No acute disease **This report has been created using voice recognition software. It may contain minor errors which are inherent in voice recognition technology. ** Final report electronically signed by Dr. Ward Mar on 12/18/2021 12:42 AM    CT INTERPRETATION OF OUTSIDE IMAGES    Result Date: 12/18/2021   ADDENDUM #1  Receipt of this report by the clinical staff was confirmed with Jeramie Ortega RN on Dec 18, 2021 04:29:00 EST.  This document has been electronically signed by: Lalita Blue on 12/18/2021 04:30 AM  ORIGINAL REPORT CT thoracic spine without IV contrast. Comparison: None Findings: Normal alignment. Osteopenia. Acute oblique nondisplaced fracture through the mid and inferior aspects of the T8 vertebral body. No significant height loss or retropulsion. Additional acute, oblique fracture through the T8 spinous process. Disruption of anterior longitudinal ligament calcification at the T8 level and therefore ligamentous disruption cannot be excluded. Multilevel degenerative disc space narrowing and hypertrophic spurring. Prominent ligamentous calcifications. No high grade canal stenosis noted. No paraspinal soft tissue hematoma. Visualized lung fields without acute pathology. Moderate cardiomegaly. Dense atheromatous coronary vascular calcifications. Acute , oblique nondisplaced fracture through the mid and inferior aspects of the T8 vertebral body. No significant height loss or retropulsion. Additional acute, oblique fracture through the T8 spinous process. Disruption of anterior longitudinal ligament calcification at the T8 level and therefore ligamentous disruption cannot be excluded. This document has been electronically signed by: Kayden Almonte MD on 12/18/2021 04:17 AM All CTs at this facility use dose modulation techniques and iterative reconstructions, and/or weight-based dosing when appropriate to reduce radiation to a low as reasonably achievable. CT INTERPRETATION OF OUTSIDE IMAGES    Result Date: 12/18/2021  PROCEDURE: CT INTERPRETATION OF OUTSIDE IMAGES CLINICAL INFORMATION: trauma transfer . TECHNIQUE: 2-D multiplanar reconstructed images of the lumbar spine All CT scans at this facility use dose modulation, iterative reconstruction, and/or weight-based dosing when appropriate to reduce radiation dose to as low as reasonably achievable. COMPARISON: No prior study. FINDINGS: No acute fracture or acute alignment malalignment. Severe degenerative changes of the disks and marginal osteophytes. Bones are osteopenic.  Incidental note is made of a distention of the urinary bladder partially imaged. No acute process **This report has been created using voice recognition software. It may contain minor errors which are inherent in voice recognition technology. ** Final report electronically signed by Dr. Mirna Brennan on 12/18/2021 1:00 AM    CT INTERPRETATION OF OUTSIDE IMAGES    Result Date: 12/18/2021  PROCEDURE: CT INTERPRETATION OF OUTSIDE IMAGES CLINICAL INFORMATION: trauma transfer . TECHNIQUE: 2-D multiplanar noncontrast images of the cervical spine done at [facility]). All CT scans at this facility use dose modulation, iterative reconstruction, and/or weight-based dosing when appropriate to reduce radiation dose to as low as reasonably achievable. COMPARISON: No prior study. FINDINGS: No acute fracture or acute bony malalignment. Severe degenerative changes throughout. Osteopenia. No precervical soft tissue swelling. No acute process **This report has been created using voice recognition software. It may contain minor errors which are inherent in voice recognition technology. ** Final report electronically signed by Dr. Mirna Brennan on 12/18/2021 12:59 AM    CT INTERPRETATION OF OUTSIDE IMAGES    Result Date: 12/18/2021  PROCEDURE: CT INTERPRETATION OF OUTSIDE IMAGES CLINICAL INFORMATION: trauma transfer  . TECHNIQUE: 2-D multiplanar noncontrast images of the brain done at [facility]). All CT scans at this facility use dose modulation, iterative reconstruction, and/or weight-based dosing when appropriate to reduce radiation dose to as low as reasonably achievable. COMPARISON: No prior study. FINDINGS: Marked generalized cerebral volume loss. Ventricular megaly consistent with volume loss. No evidence of acute hemorrhage. No acute infarct seen. Calvarium is intact. Small air-fluid level in the right maxillary sinus postsurgical changes of the sinus.  Radiopaque foreign body posterior to the right maxillary sinus postsurgical. Other visualized paranasal sinuses and mastoid is clear. No acute process **This report has been created using voice recognition software. It may contain minor errors which are inherent in voice recognition technology. ** Final report electronically signed by Dr. Ward Mar on 12/18/2021 12:56 AM      DVT prophylaxis: [x] Lovenox                                 [] SCDs                                 [] SQ Heparin                                 [] Encourage ambulation           [] Already on Anticoagulation     Code Status: Full Code    PT/OT Eval Status: On case    Tele:   [] yes             [x] no    Active Hospital Problems    Diagnosis Date Noted    Fall at home [C50. Oran Ahumada, K39.555] 12/18/2021    Closed fracture of eighth thoracic vertebra [facility]) Ike Dimas 12/18/2021       Electronically signed by Jenny Sue MD on 12/28/2021 at 2:03 PM

## 2021-12-28 NOTE — PROGRESS NOTES
[facility] - Dr. Anthony Romero covering Dr. Orly Melgar  Daily Progress Note  Pt Name: Edgardo Quiros Record Number: [id number]  Date of Birth 10/25/1932   Today's Date: 12/28/2021    HD: # 10    CC: Back pain     ASSESSMENT  1. Active Hospital Problems    Diagnosis Date Noted    Fall at home [C62. Georgie Angulo, V17.975] 12/18/2021    Closed fracture of eighth thoracic vertebra [facility]) [S22.069A] 12/18/2021       PROCEDURES  12/21/21 - Kyphoplasty     PLAN  Admit to Trauma Services     Trauma by fall              - PT and OT to continue               - Fall precautions     T8 vertebral body fracture with anterior longitudinal ligament disruption   T6, T7 & T8 spinous process fractures              - Neurosurgery assisting with management              - Activity as tolerated              - MRIs of the spine are unable to be completed as pacer/defib not compatible               - Neuro checks              - Maintain spinal neutrality              - Pain control   - S/p kyphoplasty 12/21 with Dr. Gema Taylor   - Gabo Evans consulted PM&R and signed off 12/22    Hyperkalemia - resolved   -Treated with Kayexalate   -Repeat CMP 12/25, K+ 4.7    Acute metabolic encephalopathy   - Stroke alert called 12/24   - Neurology saw, CT head negative, stating likely metabolic encephalopathy   - Started Aspirin 325 mg daily, decreased to 81mg daily by neurology   - Neurology signed off    - Hospitalist following for medical management    Constipation - resolved   - No documented bowel movements until 12/26 - but small   - Denied abdominal pain, nausea, or vomiting. Tolerating diet   - On senna nightly and gylcolax BID.  Fleet enema ordered prn    - Fleet enema 12/25   - Dulcolax suppository daily    - Multiple BMs documented yesterday    Hx of HTN, A-fib, DM2, CHF              - Hospitalist & cardiology assisting with management and surgical clearance     Pain control                   - Norco. Lidocaine, Morphine PRN     General diet, carb controlled  Repeat labs stable  Prophylaxis: SCDs, IS, C&DB, Pepcid, stool softeners, Lovenox   PT, OT, SLP eval and treat  Discharge disposition pending clinical course   -Patient stable from trauma perspective   -Precert initiated to IPR, discharge/readmit when pre-cert returns    -66/06: precert denied, PM&R to complete peer to peer today    SUBJECTIVE  Patient seen on 8E this morning. Patient stated he was doing well. Patient endorsed pain in back with activity but pain is well controlled. Patient denied any other pain or complaints. Patient denied any headaches, lightheadedness, dizziness, chest pain, shortness of breath, abdominal pain, nausea/vomiting, pain in extremities, and paresthesias. On exam patient sitting in hospital bed following breakfast in no acute distress. PMS intact in all four extremities. No signs of focal neurological deficits. Labs and vital signs reviewed.  Patient afebrile, vital signs stable. Labs 12/25, no leukocytosis. Hgb stable at 11.2. Lovenox for DVT prophylaxis. AM BMP, creatinine elevated but appears stable. Hospitalist following for medical management. Tolerating diet. Constipation resolved with bowel regime increase. 4 BMs documented yesterday. Patient stable from a trauma surgery perspective, pre-cert to IPR denied, PM&R to complete peer to peer today.  Case discussed with trauma surgeon, Dr. David Gutierrezcal from Last 3 Encounters:   12/28/21 216 lb 12.8 oz (98.3 kg)   08/11/16 209 lb (94.8 kg)   02/16/16 209 lb 12.8 oz (95.2 kg)     Temp Readings from Last 3 Encounters:   12/28/21 98 °F (36.7 °C) (Oral)   02/16/16 96.4 °F (35.8 °C)     BP Readings from Last 3 Encounters:   12/28/21 (!) 123/56   12/21/21 (!) 104/54   02/16/16 134/84     Pulse Readings from Last 3 Encounters:   12/28/21 82   02/16/16 72   09/09/13 92       24 HR INTAKE/OUTPUT :     Intake/Output Summary (Last 24 hours) at 12/28/2021 1911  Last data filed at 12/28/2021 0615  Gross per 24 hour   Intake 350 ml   Output 350 ml   Net 0 ml     ADULT DIET; Regular; 4 carb choices (60 gm/meal)    OBJECTIVE  CURRENT VITALS BP (!) 123/56   Pulse 82   Temp 98 °F (36.7 °C) (Oral)   Resp 16   Ht 6' 1\" (1.854 m)   Wt 216 lb 12.8 oz (98.3 kg)   SpO2 96%   BMI 28.60 kg/m²   GENERAL: Awake, alert, no acute distress, pleasant and cooperative with exam  HEENT: Normocephalic, pupils equal and reactive to light, nares patent bilaterally  NEURO: Alert and orient x3, GCS 15, follows commands, PMS intact in all four extremities, no signs of focal neurological deficits  CSPINE/BACK: No midline cervical tenderness to palpation  HEART: Regular rate and rhythm with no obvious murmurs, rubs, gallops. Distal pulses intact. LUNGS/CHEST WALL: Lungs are clear to auscultation bilaterally with no wheezes, rales, rhonchi. No respiratory distress or increased work of breathing. No chest wall tenderness to palpation. ABDOMEN: Abdomen soft, nondistended, with no tenderness to palpation. No guarding or peritoneal signs. Bowel sounds active  EXTREMITIES: No cyanosis or edema. PMS intact in all four extremities. No extremity tenderness to palpation. Range of motion intact. Strength 5/5 bilaterally with  strength and plantar/dorsiflexion. SKIN: Warm and dry    LABS  CBC :   No results for input(s): WBC, HGB, HCT, MCV, PLT in the last 72 hours. BMP:   Recent Labs     12/28/21  0744      K 4.8   CL 99   CO2 27   BUN 26*   CREATININE 1.6*     COAGS:   No results for input(s): APTT, PROT, INR in the last 72 hours. Pancreas/HFP:  No results for input(s): LIPASE, AMYLASE in the last 72 hours. No results for input(s): AST, ALT, BILIDIR, BILITOT, ALKPHOS in the last 72 hours. RADIOLOGY:  No new results    Electronically signed by Samuel Betancourt PA-C on 12/28/2021 at 11:48 AM  Patient seen and examined independently by me. Above discussed and I agree with CNP.    Labs, cultures, and radiographs where available were reviewed. See orders for the updated patient care plan. Berto Barth MD MD, patient seen for Dr. Dominique Pearce trauma by fall with T8 fracture status post kyphoplasty patient previously had some metabolic encephalopathy.   Patient is awake alert denies any abdominal pain apparently tolerating regular diet inpatient rehab was initially refused apparently [id number] with rehab ongoing continue present therapy  12/28/2021   8:06 PM

## 2021-12-28 NOTE — PROGRESS NOTES
Peer to Peer denied.  and myself spoke with patient wife and son explained that the precert and peer to peer were denied, explained that insurance company felt patient needs could be met at a lower level of care. Family voiced understanding and appreciation for the update.

## 2021-12-28 NOTE — PROGRESS NOTES
back.    Vitals: Vitals not assessed per clinical judgement, see nursing flowsheet    COGNITION: Decreased Recall, Inattention and Decreased Safety Awareness    ADL:   Grooming: Contact Guard Assistance. washing his hands at the sink while holding onto the sink with 1 hand while getting soap from the dispenser or taking a paper towel to dry off hands  Toileting: Contact Guard Assistance. pulling up/down his Depends and his pajama bottoms. Toilet transfer: SBA to/from the raised seat with armrests. BALANCE:  Sitting Balance:  Stand By Assistance. Doing UE exercises  Standing Balance: Contact Guard Assistance. grooming at the sink or finishing with his clothing management  Pt was distracted by the toilet rim and bent forward to wipe off the splatter on the rim. Verbal cues needed for safety and avoiding do something which might lead to any LOB. He did reach out for the armrest of the elevate seat while he bend down. BED MOBILITY:  Not Tested    TRANSFERS:  Sit to Stand:  Contact guard  Assistance. from the recliner chair  Stand to Sit: Stand By Assistance. to the chair     FUNCTIONAL MOBILITY:  Assistive Device: Rolling Walker  Assist Level:  Contact Guard Assistance. Distance: To and from bathroom   Forward head noted throughout. Cues provided to look ahead instead of down to floor. No LOB. ADDITIONAL ACTIVITIES:  Patient completed BUE strengthening exercises x 10 reps x 2 sets this date with a moderate resistance stretch band in shoulder horizontal abduction in order to increase strength and improve activity tolerance required for functional toilet and shower transfers and ADL routine. Patient tolerated fair. ASSESSMENT:     Activity Tolerance:  Patient tolerance of  treatment: fair. Pt stood for 1.5 minutes while finishing activities in the bathroom. He returned to the recliner chair and was needing a rest break before resuming any activity.       Discharge Recommendations: Inpatient Rehabilitation  Equipment Recommendations: Other: Will continue to monitor  Plan: Times per week: 6x  Specific instructions for Next Treatment: Functional mobility; ADLs and standing balance; UE exercises and back protection; safety awareness  Current Treatment Recommendations: Strengthening,Balance Training,Functional Mobility Training,Endurance Training,Safety Education & Training,Self-Care / ADL  Plan Comment: Pt would benefit from continued skilled OT services when medically stable and discharged from Acute. Kelvin Gonzalez is recommended. Patient Education  Patient Education: Role of OT, Plan of Care, Home Safety and Education Related to Recurrence of Impairment/Illness/Injury    Goals  Short term goals  Time Frame for Short term goals: By discharge  Short term goal 1: Pt will demonstrate functional mobility walking to/from the bathroom while using a rolling walker with no > than CGA to prepare for doing sinkside ADLs. Short term goal 2: Pt will complete grooming or dressing while standing and using 1 hand release with CGA to increase his balance and safety while doing his self care. Short term goal 3: Pt will complete transfers to/from an elevated toilet seat with armrests or a bedside commode with SBA to increase his independence with toileting routine. Short term goal 4: Pt will complete BUE ROM/moderate resistance exercises while following back protection techniques with demonstration to increase his endurance and strength for ease of doing ADLs and functional mobility. Short term goal 5: Pt will complete a spongebath or shower with no >than MIN A and verbal cues for safety to increase his independence with self care. Following session, patient left in safe position with all fall risk precautions in place.

## 2021-12-28 NOTE — PROGRESS NOTES
[address] [address]  INPATIENT PHYSICAL THERAPY  DAILY NOTE  STRZ SURGE OVERFLOW - 8E-70/8E-70A    Time In: 4064  Time Out: 1033  Timed Code Treatment Minutes: 18 Minutes  Minutes: 18          Date: 2021  Patient Name: Michelle Peace,  Gender:  male        MRN: [id number]  : 10/25/1932  (80 y.o.)     Referring Practitioner: Rosemarie Kirkland PA-C  Diagnosis: fall at home  Additional Pertinent Hx: Per EMR Agata Aleman is an 80year old male presenting to the Emergency Department as a transfer in from [facility] for evaluation and treatment of a T8 vertebral body fracture from a fall sustained 3 days prior. He reports that he was experiencing increasing back pain so he went to the [facility] ER for evaluation where they found a fracture of the T8 vertebral body. He denies hitting his head and denies loss of consciousness. He denies any numbness or tingling, no loss of bowel or bladder control. Only complaint is neck and back pain. \" Pt is s/p Kyphoplasty and cement augmentation for acute osteoporotic ( age related) compression fracture at the level T8 completed by Dr. Chad Lucero on . Prior Level of Function:  Lives With: Spouse  Type of Home: House  Home Layout: One level  Home Access: Level entry (threshhold is 1 or 2 inches)  Home Equipment: Rolling walker,Cane   Bathroom Shower/Tub: Walk-in shower,Shower chair with back  Bathroom Toilet: Standard  Bathroom Accessibility: Accessible    Receives Help From: Family  ADL Assistance: Needs assistance  Homemaking Assistance: Needs assistance  Homemaking Responsibilities: No  Ambulation Assistance: Independent  Transfer Assistance: Independent  Active : No  Additional Comments: Pt had been using a rolling walker at home. He does his own showering but has help with dressing. He has had help from spouse with managing medications.   Per family, pt frequently loses his hearing aides and had to have help to locate them.    Restrictions/Precautions:  Restrictions/Precautions: Fall Risk,General Precautions  Position Activity Restriction  Other position/activity restrictions: avoid excessive trunk flexion     SUBJECTIVE: Patient awake in bedside chair upon arrival, agreeable to therapy session. PAIN: 4/10: procedure site in back- pain increases to 8/10 post ambulation. Vitals: Vitals not assessed per clinical judgement, see nursing flowsheet    OBJECTIVE:  Bed Mobility:  Not Tested    Transfers:  Sit to Stand: Contact Guard Assistance, Minimal Assistance  Stand to [address] [address]   Initially minimal assistance required for sit > stand with cues for hand placemet, however progressed to CGA demonstrating good follow through of verbal cueing. Ambulation:  Contact Guard Assistance  Distance: 50'x1 20'x1  Surface: Level Tile  Device:Rolling Walker  Gait Deviations: Forward Flexed Posture, Decreased Step Length Bilaterally, Wide Base of Support and Very Minimal Path Deviations  Patient performs very quickly, requiring increased cues for safety- especially with turns. Close CGA required for safety. Stairs:  Minimal Assistance, Moderate Assistance  Number of Steps: 1  Height: 4\" step with Rolling Walker  Step size looks accurate for step that patient has at home. Patient very unsteady on step, requiring Min/ Mod assist for stability and cues for sequencing. Balance:  Static Standing Balance: Stand By Assistance  Dynamic Standing Balance: Contact Guard Assistance, Minimal Assistance    Exercise:  Patient was guided in 1 set(s) 12 reps of exercise to both lower extremities. Seated marches, Seated hamstring curls, Seated heel/toe raises, Long arc quads and Seated abduction/adduction. Exercises were completed for increased independence with functional mobility.     Functional Outcome Measures: Completed  AM-PAC Inpatient Mobility Raw Score : 18  AM-PAC Inpatient T-Scale Score :

## 2021-12-28 NOTE — PROGRESS NOTES
Precert denied for admission to acute inpatient rehab. Dr. Ayaka Zimmerman would like to set up a peer to peer. Peer to peer is set up for 1:30 12/28/2021.

## 2021-12-29 LAB
GLUCOSE BLD-MCNC: 115 MG/DL (ref 70–108)
GLUCOSE BLD-MCNC: 128 MG/DL (ref 70–108)
GLUCOSE BLD-MCNC: 153 MG/DL (ref 70–108)
GLUCOSE BLD-MCNC: 180 MG/DL (ref 70–108)

## 2021-12-29 PROCEDURE — 97110 THERAPEUTIC EXERCISES: CPT

## 2021-12-29 PROCEDURE — 99232 SBSQ HOSP IP/OBS MODERATE 35: CPT | Performed by: SURGERY

## 2021-12-29 PROCEDURE — 6370000000 HC RX 637 (ALT 250 FOR IP): Performed by: PHYSICIAN ASSISTANT

## 2021-12-29 PROCEDURE — 2580000003 HC RX 258: Performed by: PHYSICIAN ASSISTANT

## 2021-12-29 PROCEDURE — 82948 REAGENT STRIP/BLOOD GLUCOSE: CPT

## 2021-12-29 PROCEDURE — 97535 SELF CARE MNGMENT TRAINING: CPT

## 2021-12-29 PROCEDURE — 6370000000 HC RX 637 (ALT 250 FOR IP): Performed by: NURSE PRACTITIONER

## 2021-12-29 PROCEDURE — APPSS60 APP SPLIT SHARED TIME 46-60 MINUTES: Performed by: NURSE PRACTITIONER

## 2021-12-29 PROCEDURE — 6370000000 HC RX 637 (ALT 250 FOR IP): Performed by: INTERNAL MEDICINE

## 2021-12-29 PROCEDURE — 1200000000 HC SEMI PRIVATE

## 2021-12-29 PROCEDURE — 97530 THERAPEUTIC ACTIVITIES: CPT

## 2021-12-29 PROCEDURE — 6360000002 HC RX W HCPCS: Performed by: PHYSICIAN ASSISTANT

## 2021-12-29 PROCEDURE — 97116 GAIT TRAINING THERAPY: CPT

## 2021-12-29 RX ADMIN — MIDODRINE HYDROCHLORIDE 2.5 MG: 2.5 TABLET ORAL at 08:59

## 2021-12-29 RX ADMIN — FAMOTIDINE 20 MG: 20 TABLET ORAL at 09:00

## 2021-12-29 RX ADMIN — SODIUM CHLORIDE, PRESERVATIVE FREE 10 ML: 5 INJECTION INTRAVENOUS at 23:28

## 2021-12-29 RX ADMIN — ENOXAPARIN SODIUM 40 MG: 100 INJECTION SUBCUTANEOUS at 09:01

## 2021-12-29 RX ADMIN — MIDODRINE HYDROCHLORIDE 2.5 MG: 2.5 TABLET ORAL at 13:38

## 2021-12-29 RX ADMIN — INSULIN GLARGINE 6 UNITS: 100 INJECTION, SOLUTION SUBCUTANEOUS at 23:32

## 2021-12-29 RX ADMIN — INSULIN LISPRO 1 UNITS: 100 INJECTION, SOLUTION INTRAVENOUS; SUBCUTANEOUS at 13:37

## 2021-12-29 RX ADMIN — ALOGLIPTIN 12.5 MG: 12.5 TABLET, FILM COATED ORAL at 09:00

## 2021-12-29 RX ADMIN — TORSEMIDE 20 MG: 20 TABLET ORAL at 08:58

## 2021-12-29 RX ADMIN — INSULIN LISPRO 1 UNITS: 100 INJECTION, SOLUTION INTRAVENOUS; SUBCUTANEOUS at 17:24

## 2021-12-29 RX ADMIN — POLYETHYLENE GLYCOL 3350 17 G: 17 POWDER, FOR SOLUTION ORAL at 09:00

## 2021-12-29 RX ADMIN — CARVEDILOL 6.25 MG: 6.25 TABLET, FILM COATED ORAL at 09:00

## 2021-12-29 RX ADMIN — ACETAMINOPHEN 650 MG: 325 TABLET ORAL at 15:37

## 2021-12-29 RX ADMIN — ASPIRIN 81 MG: 81 TABLET, COATED ORAL at 09:00

## 2021-12-29 RX ADMIN — CARVEDILOL 6.25 MG: 6.25 TABLET, FILM COATED ORAL at 17:23

## 2021-12-29 RX ADMIN — LEVOTHYROXINE SODIUM 150 MCG: 0.15 TABLET ORAL at 06:16

## 2021-12-29 RX ADMIN — ATORVASTATIN CALCIUM 40 MG: 40 TABLET, FILM COATED ORAL at 23:30

## 2021-12-29 ASSESSMENT — PAIN SCALES - GENERAL
PAINLEVEL_OUTOF10: 5
PAINLEVEL_OUTOF10: 0
PAINLEVEL_OUTOF10: 6

## 2021-12-29 NOTE — PROGRESS NOTES
[facility]  INPATIENT PHYSICAL THERAPY  DAILY NOTE  STRZ SURGE OVERFLOW - 8E-70/8E-70A  Time In: 8571  Time Out: 0802  Timed Code Treatment Minutes: 31 Minutes  Minutes: 31          Date: 2021  Patient Name: Hussain Anderson,  Gender:  male        MRN: [id number]  : 10/25/1932  (80 y.o.)     Referring Practitioner: Beverlee Nyhan, PA-C  Diagnosis: fall at home  Additional Pertinent Hx: Per EMR Timmy Bobo is an 80year old male presenting to the Emergency Department as a transfer in from [facility] for evaluation and treatment of a T8 vertebral body fracture from a fall sustained 3 days prior. He reports that he was experiencing increasing back pain so he went to the outlying ER for evaluation where they found a fracture of the T8 vertebral body. He denies hitting his head and denies loss of consciousness. He denies any numbness or tingling, no loss of bowel or bladder control. Only complaint is neck and back pain. \" Pt is s/p Kyphoplasty and cement augmentation for acute osteoporotic ( age related) compression fracture at the level T8 completed by Dr. Palomo Yan on . Prior Level of Function:  Lives With: Spouse  Type of Home: House  Home Layout: One level  Home Access: Level entry (threshhold is 1 or 2 inches)  Home Equipment: Rolling walker,Cane   Bathroom Shower/Tub: Walk-in shower,Shower chair with back  Bathroom Toilet: Standard  Bathroom Accessibility: Accessible    Receives Help From: Family  ADL Assistance: Needs assistance  Homemaking Assistance: Needs assistance  Homemaking Responsibilities: No  Ambulation Assistance: Independent  Transfer Assistance: Independent  Active : No  Additional Comments: Pt had been using a rolling walker at home. He does his own showering but has help with dressing. He has had help from spouse with managing medications.   Per family, pt frequently loses his hearing aides and had to have help to locate them.    Restrictions/Precautions:  Restrictions/Precautions: Fall Risk,General Precautions  Position Activity Restriction  Other position/activity restrictions: avoid excessive trunk flexion     SUBJECTIVE: OK to see pt per nursing. Pt in bed when therapy arrived, agreeable to PT session, requesting to use the restroom. Pt reports dizziness once sitting EOB and with standing. Pt decline exercises following toileting due to reports of fatigue    PAIN: neck pain, did not give rating, assisted pt with applying biofreeze    Vitals: Blood Pressure: 98/50 sitting, attempted x4 to get in standing wtih errors occuring    OBJECTIVE:  Bed Mobility:  Rolling to Right: Moderate Assistance, X 1, with head of bed raised, with rail, with verbal cues    Supine to Sit: Moderate Assistance, X 1, with head of bed raised, with rail, with verbal cues , with increased time for completion   Cues for proper technique of completion with assist required for trunk support with sitting on EOB  Transfers:  Sit to Stand: Contact Guard Assistance, Minimal Assistance, X 1, cues for hand placement, with verbal cues  Stand to Sit:Contact Guard Assistance, Minimal Assistance, X 1, cues for hand placement, with verbal cues  RW for support. Pt completed from various surfaces and heights with multiple trials completed  Ambulation:  Contact Guard Assistance, X 1, with cues for safety, with verbal cues   Distance: 15 feet, 10 feet  Surface: Level Tile  Device:Rolling Walker  Gait Deviations:   Forward Flexed Posture, Slow Leonie, Decreased Step Length Bilaterally, Decreased Weight Shift Bilaterally, Decreased Gait Speed, Decreased Heel Strike Bilaterally, Wide Base of Support, Unsteady Gait and Decreased Terminal Knee Extension  Cues for safety with no LOB noted, fair demo due to incontinence on way to bathroom  Balance:  Static Sitting Balance:  Supervision  Dynamic Sitting Balance: Supervision  Static Standing Balance: Contact Guard Assistance, X 1, with cues for safety, with verbal cues   Pt required assist with donning and donning brief and pants in sitting and in standing. Pt with increased amount of time during toileting due to incontinence. Pt required assist for lei care in standing x 2 for completion as pt had to sit again to complete BM    Functional Outcome Measures: Completed  AM-PAC Inpatient Mobility Raw Score : 17  AM-PAC Inpatient T-Scale Score : 42.13    ASSESSMENT:  Assessment: Patient progressing toward established goals. Activity Tolerance:  Patient tolerance of  treatment: good. Equipment Recommendations:Equipment Needed: No  Discharge Recommendations: Inpatient Rehabilitation and Patient would benefit from continued PT at discharge  Plan: Times per week: 6x O/T  Current Treatment Recommendations: Strengthening,Neuromuscular Re-education,Home Exercise Program,Safety Education & Gladys Verdi Training,Patient/Caregiver Education & Training,Functional Mobility Training,Equipment Evaluation, Education, & procurement,Transfer Training,Gait Training,Stair training    Patient Education  Patient Education: Plan of Care, [employer] Mobility, Equipment Education, Transfers, Gait, Use of [employer],  - Patient Verbalized Understanding, - Patient Requires Continued Education    Goals:  Patient goals : intpatient rehab and then return home  Short term goals  Time Frame for Short term goals: by discharge  Short term goal 1: Pt will amb with RW with S for 50 feet to progress with [place] distances. Short term goal 2: Pt will demo S for transfers with good safety and RW for support to progress towards PLOF. Short term goal 3: Pt will demo IND with bed mobility tasks with bed flat to progress towards PLOF safely. Short term goal 4: Pt will negotiate a step for small threshold into home with SBA for safety to progress with mobility.   Long term goals  Time Frame for Long term goals : NA due to short ELOS    Following session, patient left in safe position with all fall risk precautions in place. Pt left in bedside chair following session, all needs and call light in reach, chair alarm on, telesitter present.

## 2021-12-29 NOTE — PROGRESS NOTES
Abner Israel from the [facility] called and they can accept patient. She is going to start the pre-cert. Called patient's son Gregg Leonardo informed him that [address] [place] [address] accepted and is going to start the pre-cert.

## 2021-12-29 NOTE — PROGRESS NOTES
KANNAN called The [address] [place] [address] with referral for patient. KANNAN talked to Cecil Ramirez she is going to review and give KANNAN a call back at [phone] if can accept.

## 2021-12-29 NOTE — PROGRESS NOTES
[facility]  Occupational Therapy  Daily Note  Time:   Time In: 7  Time Out: 1450  Timed Code Treatment Minutes: 23 Minutes  Minutes: 23          Date: 2021  Patient Name: Trevor Matthews,   Gender: male      Room: [facility]  MRN: [id number]  : 10/25/1932  (80 y.o.)  Referring Practitioner: Alexandra Arriaza PA-C  Diagnosis: Fall at [facility]  Additional Pertinent Hx: Jeremy Hamming from standing position a couple days ago at the time EMS was called and they assisted him off the floor. Yesterday his pain was getting worse and therefore went to his local hospital at [facility]. There he was found to have fractures of his thoracic spine and was transferred to us for further evaluation. He does not take any blood thinners. He complains of pain in his neck and back. He had a negative CT cervical spine at the outside hospital and received a dose of fentanyl prior to transfer. Pt is S/P kyphoplasty at T8 on 21. Restrictions/Precautions:  Restrictions/Precautions: Fall Risk,General Precautions  Position Activity Restriction  Other position/activity restrictions: avoid excessive trunk flexion     SUBJECTIVE: Patient sleeping in bed with HOB elevated upon arrival; agreeable to therapy this date. Patient pleasant and cooperative however provided encouragement and education for proper sleep cycle as patient states he wanted to return to bed at end of session to sleep     PAIN: 0/10:     Vitals: Vitals not assessed per clinical judgement, see nursing flowsheet    COGNITION: Slow Processing, Decreased Insight, Decreased Problem Solving and Decreased Safety Awareness    ADL:   Grooming: Supervision to comb hair  Lower Extremity Dressing: Minimal Assistance. B socks with able to able to glenda pants seated EOB with verbal cues to prevent excessive trunk flexion unable to follow through.   Increased time; unsteadiness with clothing management    BALANCE:  Sitting Balance:  Stand By Assistance. Standing Balance: Contact Guard Assistance, Minimal Assistance. with RW    BED MOBILITY:  Supine to Sit: Minimal Assistance trunk support to upright posture    TRANSFERS:  Sit to Stand:  Contact Guard Assistance. Stand to Sit: Contact Guard Assistance. FUNCTIONAL MOBILITY:  Assistive Device: Rolling Walker  Assist Level:  Contact Guard Assistance. Distance: x 6 feet EOB to bedside chair  Forward flex posture, no LOB, slow pace     ADDITIONAL ACTIVITIES:  Patient completed BUE strengthening exercises with skilled education on HEP: completed x15 reps x1 set with a moderate resistance band in all joints and all planes in order to improve UE strength and activity tolerance required for BADL routine and toilet / shower transfers. Patient tolerated good, requiring minimal rest breaks. Patient also required minimal verbal/visual cues for technique. ASSESSMENT:     Activity Tolerance:  Patient tolerance of  treatment: good. Discharge Recommendations: ECF with OT and Patient would benefit from continued OT at discharge  Equipment Recommendations: Other: Will continue to monitor  Plan: Times per week: 6x  Specific instructions for Next Treatment: Functional mobility; ADLs and standing balance; UE exercises and back protection; safety awareness  Current Treatment Recommendations: Strengthening,Balance Training,Functional Mobility Training,Endurance Training,Safety Education & Training,Self-Care / ADL  Plan Comment: Pt would benefit from continued skilled OT services when medically stable and discharged from Acute. [facility] is recommended.     Patient Education  Patient Education: Role of OT, Plan of Care, ADL's, Home Exercise Program and Precautions    Goals  Short term goals  Time Frame for Short term goals: By discharge  Short term goal 1: Pt will demonstrate functional mobility walking to/from the bathroom while using a rolling walker with no > than CGA to prepare for doing sinkside ADLs.  Short term goal 2: Pt will complete grooming or dressing while standing and using 1 hand release with CGA to increase his balance and safety while doing his self care. Short term goal 3: Pt will complete transfers to/from an elevated toilet seat with armrests or a bedside commode with SBA to increase his independence with toileting routine. Short term goal 4: Pt will complete BUE ROM/moderate resistance exercises while following back protection techniques with demonstration to increase his endurance and strength for ease of doing ADLs and functional mobility. Short term goal 5: Pt will complete a spongebath or shower with no >than MIN A and verbal cues for safety to increase his independence with self care. Following session, patient left in safe position with all fall risk precautions in place.

## 2021-12-29 NOTE — PROGRESS NOTES
Comprehensive Nutrition Assessment    Type and Reason for Visit:  Initial,RD Nutrition Re-Screen/LOS    Nutrition Recommendations/Plan:   -Consider MVI. -ONS initiated: Glucerna BID. -Continue current diet. Nutrition Assessment:    Pt. nutritionally compromised AEB incisional wound. At risk for further nutrition compromise r/t increased nutrient needs for wound healing, fall with T8 fx, s/p kyphoplasty 12/21/21, and underlying medical condition (hx: CHF, HTN, DB, thyroid disease, arthritis, constipation, SunDown syndrome). Nutrition recommendations/interventions as per above. Malnutrition Assessment:  Malnutrition Status: At risk for malnutrition (Comment)    Context:  Chronic Illness     Findings of the 6 clinical characteristics of malnutrition:  Energy Intake:  Mild decrease in energy intake (Comment)  Weight Loss:  No significant weight loss (per pt report)     Body Fat Loss:  1 - Mild body fat loss (hard to say if age-related loss) Orbital   Muscle Mass Loss:  No significant muscle mass loss    Fluid Accumulation:  1 - Mild Generalized   Strength:  Not Performed    Estimated Daily Nutrient Needs:  Energy (kcal):  6542-4111 kcals (20-25 kcals/kg/day); Weight Used for Energy Requirements:   (98 kg)     Protein (g):   grams (1-1.2 grams/kg IBW/day) pending renal status; Weight Used for Protein Requirements:  Ideal (84 kg)          Nutrition Related Findings:   Patient seen, very [place], states his hearing aides are at home, his wife didn't want him to loose them. Telesitter present as well. States overall not a big eater, does try to eat something at all 3 meals. Denies any weight loss in the last year. Agreeable to ONS. BM 12/29/21.  12/28/21: Glucose 101, BUN 26, Creatinine 1.6.  12/29/21: Chemstick 128. Rx: lipitor, dulcolax, pepcid, lantus, humalog, synthroid, glycolax, senokot, demedex.         Wounds:  Surgical Incision (12/21/21 back medial incision, s/p kyphoplasty)

## 2021-12-29 NOTE — PROGRESS NOTES
I have independently performed an evaluation on Yobany Eric . I have reviewed the above documentation completed by the [facility]. Please see my additional contributions to the HPI, physical exam, assessment/medical decision making. Plan for discharge in the next 1 to 2 days. Patient is continues to have back pain. Aggressive bowel regiment. Electronically signed by Dorina Billy MD on 12/30/2021 at 3:03 PM    [facility] - Dr. Elver Stevenson  Daily Progress Note  Pt Name: Tory Christine Record Number: [id number]  Date of Birth 10/25/1932   Today's Date: 12/29/2021    HD: # 11    CC: Back pain     ASSESSMENT  1. Active Hospital Problems    Diagnosis Date Noted    Fall at home [C01. Mae Hernández, H44.013] 12/18/2021    Closed fracture of eighth thoracic vertebra [facility]) [S22.069A] 12/18/2021       PROCEDURES  12/21/21 - Kyphoplasty     PLAN  Admit to Trauma Services     Trauma by fall              - PT and OT to continue               - Fall precautions     T8 vertebral body fracture with anterior longitudinal ligament disruption   T6, T7 & T8 spinous process fractures              - Neurosurgery assisting with management              - Activity as tolerated              - MRIs of the spine are unable to be completed as pacer/defib not compatible               - Neuro checks              - Maintain spinal neutrality              - Pain control   - S/p kyphoplasty 12/21 with Dr. Asif Klein   - Liseth Jeffries consulted PM&R and signed off 12/22    Hyperkalemia - resolved   -Treated with Kayexalate   -Repeat CMP 12/25, K+ 4.7    Acute metabolic encephalopathy   - Stroke alert called 12/24   - Neurology saw, CT head negative, stating likely metabolic encephalopathy   - Started Aspirin 325 mg daily, decreased to 81mg daily by neurology   - Neurology signed off    - Hospitalist following for medical management    Constipation - resolved   - No documented bowel movements until 12/26 - but small   - Denied abdominal pain, nausea, or vomiting. Tolerating diet   - On senna nightly and gylcolax BID. Fleet enema ordered prn    - Fleet enema 12/25   - Dulcolax suppository daily    - Multiple BMs documented yesterday    Hx of HTN, A-fib, DM2, CHF              - Hospitalist & cardiology assisting with management and surgical clearance     Pain control                   - Norco. Lidocaine, Morphine PRN     General diet, carb controlled  Repeat labs stable  Prophylaxis: SCDs, IS, C&DB, Pepcid, stool softeners, Lovenox   PT, OT, SLP eval and treat  Discharge disposition pending clinical course   -Patient stable from trauma perspective   -Precert initiated to IPR, discharge/readmit when pre-cert returns    -11/25: precert denied, peer to peer denied, plan for ECF    SUBJECTIVE  Patient seen on 8E this morning. He is up in the chair. States he still has back pain. Family at bedside. Plan for ECF. Case discussed with trauma surgeon, Dr. Tim Retana from Last 3 Encounters:   12/29/21 216 lb 4.8 oz (98.1 kg)   08/11/16 209 lb (94.8 kg)   02/16/16 209 lb 12.8 oz (95.2 kg)     Temp Readings from Last 3 Encounters:   12/29/21 97.4 °F (36.3 °C) (Oral)   02/16/16 96.4 °F (35.8 °C)     BP Readings from Last 3 Encounters:   12/29/21 (!) 115/55   12/21/21 (!) 104/54   02/16/16 134/84     Pulse Readings from Last 3 Encounters:   12/29/21 66   02/16/16 72   09/09/13 92       24 HR INTAKE/OUTPUT :     Intake/Output Summary (Last 24 hours) at 12/29/2021 1520  Last data filed at 12/29/2021 1345  Gross per 24 hour   Intake 840 ml   Output --   Net 840 ml     ADULT DIET;  Regular; 4 carb choices (60 gm/meal)  ADULT ORAL NUTRITION SUPPLEMENT; Breakfast, Dinner; Diabetic Oral Supplement    OBJECTIVE  CURRENT VITALS BP (!) 115/55   Pulse 66   Temp 97.4 °F (36.3 °C) (Oral)   Resp 14   Ht 6' 1\" (1.854 m)   Wt 216 lb 4.8 oz (98.1 kg)   SpO2 97%   BMI 28.54 kg/m²   GENERAL: Awake, alert, no acute distress, pleasant and cooperative with exam  HEENT: Normocephalic, pupils equal and reactive to light, nares patent bilaterally  NEURO: Alert and orient x3, GCS 15, follows commands, PMS intact in all four extremities, no signs of focal neurological deficits  CSPINE/BACK: No midline cervical tenderness to palpation  HEART: Regular rate and rhythm with no obvious murmurs, rubs, gallops. Distal pulses intact. LUNGS/CHEST WALL: Lungs are clear to auscultation bilaterally with no wheezes, rales, rhonchi. No respiratory distress or increased work of breathing. No chest wall tenderness to palpation. ABDOMEN: Abdomen soft, nondistended, with no tenderness to palpation. No guarding or peritoneal signs. Bowel sounds active  EXTREMITIES: No cyanosis or edema. PMS intact in all four extremities. No extremity tenderness to palpation. Range of motion intact. Strength 5/5 bilaterally with  strength and plantar/dorsiflexion. SKIN: Warm and dry    LABS  CBC :   No results for input(s): WBC, HGB, HCT, MCV, PLT in the last 72 hours. BMP:   Recent Labs     12/28/21  0744      K 4.8   CL 99   CO2 27   BUN 26*   CREATININE 1.6*     COAGS:   No results for input(s): APTT, PROT, INR in the last 72 hours. Pancreas/HFP:  No results for input(s): LIPASE, AMYLASE in the last 72 hours. No results for input(s): AST, ALT, BILIDIR, BILITOT, ALKPHOS in the last 72 hours.   RADIOLOGY:  No new results    Electronically signed by MESSI Sánchez CNP on 12/29/2021 at 3:20 PM

## 2021-12-29 NOTE — FLOWSHEET NOTE
Medically stable. Vitals, labs normal. Denied IPR, plan for SNF. Will sign off, please call if any questions.

## 2021-12-30 LAB
GLUCOSE BLD-MCNC: 122 MG/DL (ref 70–108)
GLUCOSE BLD-MCNC: 157 MG/DL (ref 70–108)
GLUCOSE BLD-MCNC: 184 MG/DL (ref 70–108)
GLUCOSE BLD-MCNC: 86 MG/DL (ref 70–108)
SARS-COV-2, NAAT: NOT  DETECTED

## 2021-12-30 PROCEDURE — 6370000000 HC RX 637 (ALT 250 FOR IP): Performed by: PHYSICIAN ASSISTANT

## 2021-12-30 PROCEDURE — 97530 THERAPEUTIC ACTIVITIES: CPT

## 2021-12-30 PROCEDURE — 6370000000 HC RX 637 (ALT 250 FOR IP): Performed by: INTERNAL MEDICINE

## 2021-12-30 PROCEDURE — 97116 GAIT TRAINING THERAPY: CPT

## 2021-12-30 PROCEDURE — 1200000000 HC SEMI PRIVATE

## 2021-12-30 PROCEDURE — 82948 REAGENT STRIP/BLOOD GLUCOSE: CPT

## 2021-12-30 PROCEDURE — 6360000002 HC RX W HCPCS: Performed by: PHYSICIAN ASSISTANT

## 2021-12-30 PROCEDURE — 6370000000 HC RX 637 (ALT 250 FOR IP): Performed by: NURSE PRACTITIONER

## 2021-12-30 PROCEDURE — 97110 THERAPEUTIC EXERCISES: CPT

## 2021-12-30 PROCEDURE — 87635 SARS-COV-2 COVID-19 AMP PRB: CPT

## 2021-12-30 PROCEDURE — APPSS60 APP SPLIT SHARED TIME 46-60 MINUTES: Performed by: PHYSICIAN ASSISTANT

## 2021-12-30 RX ORDER — MIDODRINE HYDROCHLORIDE 2.5 MG/1
2.5 TABLET ORAL
Qty: 90 TABLET | Refills: 3 | Status: SHIPPED | OUTPATIENT
Start: 2021-12-30

## 2021-12-30 RX ORDER — HYDROCODONE BITARTRATE AND ACETAMINOPHEN 5; 325 MG/1; MG/1
1 TABLET ORAL EVERY 6 HOURS PRN
Qty: 12 TABLET | Refills: 0 | Status: SHIPPED | OUTPATIENT
Start: 2021-12-30 | End: 2022-01-02

## 2021-12-30 RX ORDER — ATORVASTATIN CALCIUM 40 MG/1
40 TABLET, FILM COATED ORAL NIGHTLY
Qty: 30 TABLET | Refills: 3 | Status: SHIPPED | OUTPATIENT
Start: 2021-12-30

## 2021-12-30 RX ORDER — ALOGLIPTIN 12.5 MG/1
12.5 TABLET, FILM COATED ORAL DAILY
Qty: 60 TABLET | Refills: 0 | Status: SHIPPED | OUTPATIENT
Start: 2021-12-31

## 2021-12-30 RX ORDER — ASPIRIN 81 MG/1
81 TABLET ORAL DAILY
Qty: 30 TABLET | Refills: 3 | Status: SHIPPED | OUTPATIENT
Start: 2021-12-31

## 2021-12-30 RX ADMIN — HYDROCODONE BITARTRATE AND ACETAMINOPHEN 2 TABLET: 5; 325 TABLET ORAL at 04:06

## 2021-12-30 RX ADMIN — MIDODRINE HYDROCHLORIDE 2.5 MG: 2.5 TABLET ORAL at 08:36

## 2021-12-30 RX ADMIN — ALOGLIPTIN 12.5 MG: 12.5 TABLET, FILM COATED ORAL at 08:36

## 2021-12-30 RX ADMIN — POLYETHYLENE GLYCOL 3350 17 G: 17 POWDER, FOR SOLUTION ORAL at 22:08

## 2021-12-30 RX ADMIN — MIDODRINE HYDROCHLORIDE 2.5 MG: 2.5 TABLET ORAL at 13:28

## 2021-12-30 RX ADMIN — CARVEDILOL 6.25 MG: 6.25 TABLET, FILM COATED ORAL at 08:36

## 2021-12-30 RX ADMIN — ATORVASTATIN CALCIUM 40 MG: 40 TABLET, FILM COATED ORAL at 22:09

## 2021-12-30 RX ADMIN — INSULIN GLARGINE 6 UNITS: 100 INJECTION, SOLUTION SUBCUTANEOUS at 22:48

## 2021-12-30 RX ADMIN — INSULIN LISPRO 1 UNITS: 100 INJECTION, SOLUTION INTRAVENOUS; SUBCUTANEOUS at 22:48

## 2021-12-30 RX ADMIN — INSULIN LISPRO 1 UNITS: 100 INJECTION, SOLUTION INTRAVENOUS; SUBCUTANEOUS at 13:28

## 2021-12-30 RX ADMIN — ASPIRIN 81 MG: 81 TABLET, COATED ORAL at 08:36

## 2021-12-30 RX ADMIN — CARVEDILOL 6.25 MG: 6.25 TABLET, FILM COATED ORAL at 18:01

## 2021-12-30 RX ADMIN — FAMOTIDINE 20 MG: 20 TABLET ORAL at 08:36

## 2021-12-30 RX ADMIN — MIDODRINE HYDROCHLORIDE 2.5 MG: 2.5 TABLET ORAL at 18:02

## 2021-12-30 RX ADMIN — LEVOTHYROXINE SODIUM 150 MCG: 0.15 TABLET ORAL at 06:30

## 2021-12-30 RX ADMIN — ENOXAPARIN SODIUM 40 MG: 100 INJECTION SUBCUTANEOUS at 08:36

## 2021-12-30 RX ADMIN — TORSEMIDE 20 MG: 20 TABLET ORAL at 08:36

## 2021-12-30 RX ADMIN — SENNOSIDES 34.4 MG: 8.6 TABLET, COATED ORAL at 22:08

## 2021-12-30 ASSESSMENT — PAIN SCALES - GENERAL
PAINLEVEL_OUTOF10: 2
PAINLEVEL_OUTOF10: 1
PAINLEVEL_OUTOF10: 4
PAINLEVEL_OUTOF10: 6

## 2021-12-30 NOTE — PROGRESS NOTES
KANNAN contacted The [address] [place] [address] regarding insurance precert. Lige Ala not available, requested returned phone call. KANNAN to follow and maintain involvement in discharge planning.

## 2021-12-30 NOTE — PROGRESS NOTES
[facility]  [facility]  Daily Note  Time:   Time In: 1412  Time Out: 1428  Timed Code Treatment Minutes: 16 Minutes  Minutes: 16          Date: 2021  Patient Name: Melva Joyce,   Gender: male      Room: 8E-70/8E70A  MRN: [id number]  : 10/25/1932  (80 y.o.)  Referring Practitioner: Valentín Rashid PA-C  Diagnosis: Fall at [facility]  Additional Pertinent Hx: Lam Veras from standing position a couple days ago at the time EMS was called and they assisted him off the floor. Yesterday his pain was getting worse and therefore went to his local hospital at [facility]. There he was found to have fractures of his thoracic spine and was transferred to us for further evaluation. He does not take any blood thinners. He complains of pain in his neck and back. He had a negative CT cervical spine at the outside hospital and received a dose of fentanyl prior to transfer. Pt is S/P kyphoplasty at T8 on 21. Restrictions/Precautions:  Restrictions/Precautions: Fall Risk,General Precautions  Position Activity Restriction  Other position/activity restrictions: avoid excessive trunk flexion     SUBJECTIVE: Pt seated in bedside chair upon arrival, agreeable to OT session with mod encouragement and declines activity beyond chair level. PAIN: Denies. Vitals: Vitals not assessed per clinical judgement, see nursing flowsheet    COGNITION: Slow Processing and Decreased Insight    ADL:   Grooming: Not tested. Pt requesting to shave with personal electric razor, however, unable to locate among belongings. RN notified. BALANCE:  Sitting Balance:  Modified Independent. Bedside chair with BLE elevated. Standing Balance: Not Tested. Pt declined. BED MOBILITY:  Not Tested    TRANSFERS & FUNCTIONAL MOBILITY:  Pt declined d/t just \"getting comfortable\" in chair.     ADDITIONAL ACTIVITIES:  Patient identified a personal goal to increase UB strength and improve overall endurance so they can complete their toilet & shower transfers; skilled edu on UE strengthening. Completed BUE exercises x10 reps x1 sets using mod resistance band in all joints/planes to increase strength and endurance required for ADLs. Pt required min rest break between each exercise and min v/c for proper technique. ASSESSMENT:     Activity Tolerance:  Patient tolerance of  treatment: fair. Pt limited by fatigue. Discharge Recommendations: Continue to assess pending progress and Inpatient Therapy Stay  Equipment Recommendations: Other: Will continue to monitor  Plan: Times per week: 6x  Specific instructions for Next Treatment: Functional mobility; ADLs and standing balance; UE exercises and back protection; safety awareness  Current Treatment Recommendations: Strengthening,Balance Training,Functional Mobility Training,Endurance Training,Safety Education & Training,Self-Care / ADL  Plan Comment: Pt would benefit from continued skilled OT services when medically stable and discharged from Acute. Felicita Liriano is recommended. Patient Education  Patient Education: Role of OT, Home Exercise Program and Importance of Increasing Activity    Goals  Short term goals  Time Frame for Short term goals: By discharge  Short term goal 1: Pt will demonstrate functional mobility walking to/from the bathroom while using a rolling walker with no > than CGA to prepare for doing sinkside ADLs. Short term goal 2: Pt will complete grooming or dressing while standing and using 1 hand release with CGA to increase his balance and safety while doing his self care. Short term goal 3: Pt will complete transfers to/from an elevated toilet seat with armrests or a bedside commode with SBA to increase his independence with toileting routine.   Short term goal 4: Pt will complete BUE ROM/moderate resistance exercises while following back protection techniques with demonstration to increase his endurance and strength for ease of doing ADLs

## 2021-12-30 NOTE — DISCHARGE INSTR - COC
Continuity of Care Form    Patient Name: Stephanie Bauer   :  10/25/1932  MRN:  [id number]    Admit date:  2021  Discharge date:  2021    Code Status Order: Full Code   Advance Directives:      Admitting Physician:  Florence Youngblood MD  PCP: MESSI Vergara    Discharging Nurse: [facility] Unit/Room#: 8E-70/8E-70A  Discharging Unit Phone Number: [phone]    Emergency Contact:   Extended Emergency Contact Information  Primary Emergency Contact: Kenia [facility] [address]  Phone: [phone]  Relation: Child  [facility]  Address: [address] Phone: [phone]  Relation: Spouse    Past Surgical History:  Past Surgical History:   Procedure Laterality Date    CHOLECYSTECTOMY      COLONOSCOPY      OTHER SURGICAL HISTORY  2013    Green light laser photoselective vaporization of prostate    PACEMAKER INSERTION      with Defibrillator    PACEMAKER INSERTION      SPINE SURGERY N/A 2021    KYPHOPLASTY AT T8 performed by Ирина Colon MD at  [facility]       Immunization History:   Immunization History   Administered Date(s) Administered    COVID-19, STANTON Heaton, 30mcg/0.3mL 2021, 2021, 10/07/2021       Active Problems:  Patient Active Problem List   Diagnosis Code    HTN (hypertension) I10    DM (diabetes mellitus) ([address] E11.9    Chronic retention of urine, recurrent. Poss. neurgenic bladder from Diabetes. R33.9    Fall at home W19. Eugenio Finney, Y92.009    Cardiomyopathy ([address] I42.9    Paroxysmal atrial fibrillation (HCC) I48.0    Stage 3 chronic kidney disease (HCC) N18.30    SunDown syndrome F05    Gastro-esophageal reflux disease without esophagitis K21.9    Biventricular implantable cardioverter-defibrillator (ICD) in situ Z95.810    Closed fracture of eighth thoracic vertebra ([address] E83.879Z Isolation/Infection:   Isolation            No Isolation          Patient Infection Status       None to display            Nurse Assessment:  Last Vital Signs: /61   Pulse 79   Temp 97.8 °F (36.6 °C) (Oral)   Resp 16   Ht 6' 1\" (1.854 m)   Wt 216 lb (98 kg)   SpO2 97%   BMI 28.50 kg/m²     Last documented pain score (0-10 scale): Pain Level: 4  Last Weight:   Wt Readings from Last 1 Encounters:   12/30/21 216 lb (98 kg)     Mental Status:  {IP PT MENTAL STATUS:20030}    IV Access:  { MELO IV ACCESS:367459916}    Nursing Mobility/ADLs:  Walking   {P DME ZELT:773884555}  Transfer  {P DME SIOT:659777095}  Bathing  {P DME WLGU:228874367}  Dressing  {CHP DME YUJD:049790866}  Toileting  {P DME VXFU:257845056}  Feeding  {[facility] DME HYAR:512744349}  Med Admin  {[facility] DME EWPL:444604716}  Med Delivery   { MELO MED Delivery:458557706}    Wound Care Documentation and Therapy:        Elimination:  Continence: Bowel: {YES / SJ:11507}  Bladder: {YES / EZ:48411}  Urinary Catheter: {Urinary Catheter:835043313}   Colostomy/Ileostomy/Ileal Conduit: {YES / LW:41265}       Date of Last BM: ***    Intake/Output Summary (Last 24 hours) at 12/30/2021 1621  Last data filed at 12/30/2021 1502  Gross per 24 hour   Intake 840 ml   Output 950 ml   Net -110 ml     I/O last 3 completed shifts:   In: 5 [P.O.:420]  Out: 950 [Urine:950]    Safety Concerns:     [employer] Safety Concerns:843378562}    Impairments/Disabilities:      [employer] Impairments/Disabilities:839657779}    Nutrition Therapy:  Current Nutrition Therapy:   [employer] Diet List:129286948}    Routes of Feeding: {CHP DME Other Feedings:763488485}  Liquids: {Slp liquid thickness:88162}  Daily Fluid Restriction: {CHP DME Yes amt example:358676518}  Last Modified Barium Swallow with Video (Video Swallowing Test): {Done Not Done FSF:004062849}    Treatments at the Time of Hospital Discharge:   Respiratory Treatments: ***  Oxygen Therapy:  {Therapy; copd oxygen:13141}  Ventilator:    {MH CC Vent ZQOV:397391440}    Rehab Therapies: {THERAPEUTIC INTERVENTION:4921473794}  Weight Bearing Status/Restrictions: [address] [employer] CC Weight Bearin}  Other Medical Equipment (for information only, NOT a DME order):  {EQUIPMENT:710485353}  Other Treatments: ***    Patient's personal belongings (please select all that are sent with patient):  {CHP DME Belongings:154912485}    RN SIGNATURE:  {Esignature:806922504}    CASE MANAGEMENT/SOCIAL WORK SECTION    Inpatient Status Date: 2021 to 2021 Acute Hospitalization. Readmission Risk Assessment Score:  Readmission Risk              Risk of Unplanned Readmission:  19           Discharging to Facility/ Agency   Name: [facility]  Address: [place] [facility], [address] [place] [address]  Phone: [phone]  Fax: [phone]    / signature: Electronically signed by JAIME Bustamante on 21 at 7:15 PM EST    PHYSICIAN SECTION    Prognosis: Good    Condition at Discharge: Stable    Rehab Potential (if transferring to Rehab): Fair    Recommended Labs or Other Treatments After Discharge: None    Physician Certification: I certify the above information and transfer of Oanh Roblero  is necessary for the continuing treatment of the diagnosis listed and that he requires [facility] for greater than 30 days.      Update Admission H&P: No change in H&P    PHYSICIAN SIGNATURE:  Electronically signed by ANGELICA Campbell in direct collaboration with Dr. Samantha Richardson on 21 at 8:18 AM EST

## 2021-12-30 NOTE — PROGRESS NOTES
[address] [address]  INPATIENT PHYSICAL THERAPY  DAILY NOTE  STRZ SURGE OVERFLOW - 8E-70/8E-70A  Time In: 6287  Time Out: 0815  Timed Code Treatment Minutes: 26 Minutes  Minutes: 26          Date: 2021  Patient Name: Astrid Post,  Gender:  male        MRN: [id number]  : 10/25/1932  (80 y.o.)     Referring Practitioner: Fabi Butterfield PA-C  Diagnosis: fall at home  Additional Pertinent Hx: Per EMR Kenia Ahuja is an 80year old male presenting to the Emergency Department as a transfer in [facility] for evaluation and treatment of a T8 vertebral body fracture from a fall sustained 3 days prior. He reports that he was experiencing increasing back pain so he went to the [facility] ER for evaluation where they found a fracture of the T8 vertebral body. He denies hitting his head and denies loss of consciousness. He denies any numbness or tingling, no loss of bowel or bladder control. Only complaint is neck and back pain. \" Pt is s/p Kyphoplasty and cement augmentation for acute osteoporotic ( age related) compression fracture at the level T8 completed by Dr. Anna Jorgensen on . Prior Level of Function:  Lives With: Spouse  Type of Home: House  Home Layout: One level  Home Access: Level entry (threshhold is 1 or 2 inches)  Home Equipment: Rolling walker,Cane   Bathroom Shower/Tub: Walk-in shower,Shower chair with back  Bathroom Toilet: Standard  Bathroom Accessibility: Accessible    Receives Help From: Family  ADL Assistance: Needs assistance  Homemaking Assistance: Needs assistance  Homemaking Responsibilities: No  Ambulation Assistance: Independent  Transfer Assistance: Independent  Active : No  Additional Comments: Pt had been using a rolling walker at home. He does his own showering but has help with dressing. He has had help from spouse with managing medications.   Per family, pt frequently loses his hearing aides and had to have help to locate them.    Restrictions/Precautions:  Restrictions/Precautions: Fall Risk,General Precautions  Position Activity Restriction  Other position/activity restrictions: avoid excessive trunk flexion     SUBJECTIVE: Ok to see pt per nursing. Pt in bed when therapy arrived, requesting to use restroom, get washed up and brush teeth prior to sitting in chair for breakfast.     PAIN: mild neck into upper back pain, did not give rating    Vitals: Vitals not assessed per clinical judgement, see nursing flowsheet    OBJECTIVE:  Bed Mobility:  Rolling to Right: Contact Guard Assistance, X 1, with head of bed raised, with rail, with verbal cues    Supine to Sit: Contact Guard Assistance, X 1, with head of bed raised, with rail, with verbal cues   Cues for proper technique of completion with good demo as pt wanting to reach for therapist to help sit EOB  Transfers:  Sit to Stand: [employer], X 1, with increased time for completion, cues for hand placement, with verbal cues  Stand to [address] [address], X 1, cues for hand placement, with verbal cues  RW for support with mobility. Pt completed transfers from various surfaces and heights during session, no LOB noted with crouched stance noted  Ambulation:  Contact Guard Assistance, X 1, with cues for safety, with verbal cues   Distance: 12 feet x2, 4 feet x1  Surface: Level Tile  Device:Rolling Walker  Gait Deviations: Forward Flexed Posture, Slow Leonie, Decreased Step Length on Left, Decreased Gait Speed, Decreased Heel Strike Bilaterally, Wide Base of Support, Mild Path Deviations, Unsteady Gait and Decreased Terminal Knee Extension  Cues required for safety, no LOB noted, cues for maintaining head up to improve FWD eye gaze, fair demo  Balance:  Static Sitting Balance:  Supervision, pt completed toileting tasks in sitting  Dynamic Sitting Balance: Supervision, Stand By Assistance.  Pt washed self up in sitting with cues for safety, required increased assist for back. Pt required assist for donning and doffing brief and pants during session with cues required for technique to help with self completion  Static Standing Balance: Contact Guard Assistance. Pt required assist for lei care in standing with B UE support on RW, pt able to complete with lei care, however to make sure pt was clean, PT assisted. Dynamic Standing Balance: Contact Guard Assistance, Minimal Assistance, X 1, with cues for safety, with verbal cues. Pt standing at sink to brush teeth, required 1 UE support at all times, no LOB noted, unsteadiness with mobility. Functional Outcome Measures: Completed  AM-PAC Inpatient Mobility Raw Score : 17  AM-PAC Inpatient T-Scale Score : 42.13    ASSESSMENT:  Assessment: Patient progressing toward established goals. Activity Tolerance:  Patient tolerance of  treatment: good. Equipment Recommendations:Equipment Needed: No  Discharge Recommendations: Inpatient Rehabilitation and Patient would benefit from continued PT at discharge  Plan: Times per week: 6x O/T  Current Treatment Recommendations: Strengthening,Neuromuscular Re-education,Home Exercise Program,Safety Education & Garnette Iha Training,Patient/Caregiver Education & Training,Functional Mobility Training,Equipment Evaluation, Education, & procurement,Transfer Training,Gait Training,Stair training    Patient Education  Patient Education: Plan of Care, [employer] Mobility, Equipment Education, Transfers, Gait, Use of [employer], Up in Chair for All Meals, Activity Pacing,  - Patient Verbalized Understanding, - Patient Requires Continued Education    Goals:  Patient goals : intpatient rehab and then return home  Short term goals  Time Frame for Short term goals: by discharge  Short term goal 1: Pt will amb with RW with S for 50 feet to [facility] distances. Short term goal 2: Pt will demo S for transfers with good safety and RW for support to progress towards PLOF.   Short term goal 3: Pt will demo IND with bed mobility tasks with bed flat to progress towards PLOF safely. Short term goal 4: Pt will negotiate a step for small threshold into home with SBA for safety to progress with mobility. Long term goals  Time Frame for Long term goals : NA due to short ELOS    Following session, patient left in safe position with all fall risk precautions in place. Pt left in bedside chair with all needs and call light in reach with telesitter present.

## 2021-12-30 NOTE — PROGRESS NOTES
[facility] - Dr. Nubia Mota  Daily Progress Note  Pt Name: Silvano Dyer  Medical Record Number: [id number]  Date of Birth 10/25/1932   Today's Date: 12/30/2021    HD: # 12    CC: Back pain     ASSESSMENT  1. Active Hospital Problems    Diagnosis Date Noted    Fall at home [D77. Antonia Nesbitt, A51.808] 12/18/2021    Closed fracture of eighth thoracic vertebra [facility]) [S22.069A] 12/18/2021       PROCEDURES  12/21/21 - Kyphoplasty     PLAN  Admit to Trauma Services     Trauma by fall              - PT and OT to continue               - Fall precautions     T8 vertebral body fracture with anterior longitudinal ligament disruption   T6, T7 & T8 spinous process fractures              - Neurosurgery assisting with management              - Activity as tolerated              - MRIs of the spine are unable to be completed as pacer/defib not compatible               - Neuro checks              - Maintain spinal neutrality              - Pain control   - S/p kyphoplasty 12/21 with Dr. Montse Rios consulted PM&R and signed off 12/22    Hyperkalemia - resolved   -Treated with Kayexalate   -Repeat CMP 12/25, K+ 4.7    Acute metabolic encephalopathy   - Stroke alert called 12/24   - Neurology saw, CT head negative, stating likely metabolic encephalopathy   - Started Aspirin 325 mg daily, decreased to 81mg daily by neurology   - Neurology signed off    - Hospitalist following for medical management    Constipation - resolved   - No documented bowel movements until 12/26 - but small   - Denied abdominal pain, nausea, or vomiting. Tolerating diet   - On senna nightly and gylcolax BID.  Fleet enema ordered prn    - Fleet enema 12/25   - Dulcolax suppository daily    - Multiple BMs documented yesterday    Hx of HTN, A-fib, DM2, CHF              - Hospitalist & cardiology assisting with management and surgical clearance     Pain control                   - Norco. Lidocaine, Morphine PRN     General diet, carb controlled  Repeat labs stable  Prophylaxis: SCDs, IS, C&DB, Pepcid, stool softeners, Lovenox   PT, OT, SLP eval and treat  Discharge disposition pending clinical course   -Patient stable from trauma perspective   -Precert initiated to [facility], discharge/readmit when pre-cert returns    -09/44: precert denied, peer to peer denied, plan for [facility]; [facility]. Awaiting pre-cert    SUBJECTIVE  Patient seen on 8E this morning. He continues to have back pain but working with therapy. IPR denies,  Plan for [facility], awaiting pre-cert. Medicine signed off yesterday, appreciate their assistance. Case discussed with trauma surgeon, Dr. Broderick Cheema.         Wt Readings from Last 3 Encounters:   12/30/21 216 lb (98 kg)   08/11/16 209 lb (94.8 kg)   02/16/16 209 lb 12.8 oz (95.2 kg)     Temp Readings from Last 3 Encounters:   12/30/21 97.8 °F (36.6 °C) (Oral)   02/16/16 96.4 °F (35.8 °C)     BP Readings from Last 3 Encounters:   12/30/21 132/61   12/21/21 (!) 104/54   02/16/16 134/84     Pulse Readings from Last 3 Encounters:   12/30/21 79   02/16/16 72   09/09/13 92       24 HR INTAKE/OUTPUT :     Intake/Output Summary (Last 24 hours) at 12/30/2021 0933  Last data filed at 12/30/2021 0335  Gross per 24 hour   Intake 1140 ml   Output 950 ml   Net 190 ml     ADULT DIET;  Regular; 4 carb choices (60 gm/meal)  ADULT ORAL NUTRITION SUPPLEMENT; Breakfast, Dinner; Diabetic Oral Supplement    OBJECTIVE  CURRENT VITALS /61   Pulse 79   Temp 97.8 °F (36.6 °C) (Oral)   Resp 16   Ht 6' 1\" (1.854 m)   Wt 216 lb (98 kg)   SpO2 97%   BMI 28.50 kg/m²   GENERAL: Awake, alert, no acute distress, pleasant and cooperative with exam  HEENT: Normocephalic, pupils equal and reactive to light, nares patent bilaterally  NEURO: Alert and orient x3, GCS 15, follows commands, PMS intact in all four extremities, no signs of focal neurological deficits  CSPINE/BACK: No midline cervical tenderness to palpation  HEART: Regular rate and rhythm with no obvious murmurs, rubs, gallops. Distal pulses intact. LUNGS/CHEST WALL: Lungs are clear to auscultation bilaterally with no wheezes, rales, rhonchi. No respiratory distress or increased work of breathing. No chest wall tenderness to palpation. ABDOMEN: Abdomen soft, nondistended, with no tenderness to palpation. No guarding or peritoneal signs. Bowel sounds active  EXTREMITIES: No cyanosis or edema. PMS intact in all four extremities. No extremity tenderness to palpation. Range of motion intact. Strength 5/5 bilaterally with  strength and plantar/dorsiflexion. SKIN: Warm and dry    LABS  CBC :   No results for input(s): WBC, HGB, HCT, MCV, PLT in the last 72 hours. BMP:   Recent Labs     12/28/21  0744      K 4.8   CL 99   CO2 27   BUN 26*   CREATININE 1.6*     COAGS:   No results for input(s): APTT, PROT, INR in the last 72 hours. Pancreas/HFP:  No results for input(s): LIPASE, AMYLASE in the last 72 hours. No results for input(s): AST, ALT, BILIDIR, BILITOT, ALKPHOS in the last 72 hours.   RADIOLOGY:  No new results    Electronically signed by Cornel Baker PA-C on 12/30/2021 at 9:33 AM

## 2021-12-31 VITALS
HEART RATE: 88 BPM | WEIGHT: 210.5 LBS | BODY MASS INDEX: 27.9 KG/M2 | OXYGEN SATURATION: 98 % | TEMPERATURE: 97.9 F | RESPIRATION RATE: 18 BRPM | SYSTOLIC BLOOD PRESSURE: 123 MMHG | HEIGHT: 73 IN | DIASTOLIC BLOOD PRESSURE: 65 MMHG

## 2021-12-31 LAB — GLUCOSE BLD-MCNC: 112 MG/DL (ref 70–108)

## 2021-12-31 PROCEDURE — 6370000000 HC RX 637 (ALT 250 FOR IP): Performed by: PHYSICIAN ASSISTANT

## 2021-12-31 PROCEDURE — 6370000000 HC RX 637 (ALT 250 FOR IP): Performed by: NURSE PRACTITIONER

## 2021-12-31 PROCEDURE — 6360000002 HC RX W HCPCS: Performed by: PHYSICIAN ASSISTANT

## 2021-12-31 PROCEDURE — 6370000000 HC RX 637 (ALT 250 FOR IP): Performed by: INTERNAL MEDICINE

## 2021-12-31 PROCEDURE — 82948 REAGENT STRIP/BLOOD GLUCOSE: CPT

## 2021-12-31 RX ADMIN — CARVEDILOL 6.25 MG: 6.25 TABLET, FILM COATED ORAL at 08:34

## 2021-12-31 RX ADMIN — LEVOTHYROXINE SODIUM 150 MCG: 0.15 TABLET ORAL at 06:09

## 2021-12-31 RX ADMIN — FAMOTIDINE 20 MG: 20 TABLET ORAL at 08:34

## 2021-12-31 RX ADMIN — ASPIRIN 81 MG: 81 TABLET, COATED ORAL at 08:34

## 2021-12-31 RX ADMIN — ENOXAPARIN SODIUM 40 MG: 100 INJECTION SUBCUTANEOUS at 08:34

## 2021-12-31 RX ADMIN — MIDODRINE HYDROCHLORIDE 2.5 MG: 2.5 TABLET ORAL at 08:34

## 2021-12-31 RX ADMIN — ACETAMINOPHEN 650 MG: 325 TABLET ORAL at 01:58

## 2021-12-31 RX ADMIN — TORSEMIDE 20 MG: 20 TABLET ORAL at 08:34

## 2021-12-31 RX ADMIN — POLYETHYLENE GLYCOL 3350 17 G: 17 POWDER, FOR SOLUTION ORAL at 08:36

## 2021-12-31 RX ADMIN — ALOGLIPTIN 12.5 MG: 12.5 TABLET, FILM COATED ORAL at 08:34

## 2021-12-31 ASSESSMENT — PAIN SCALES - GENERAL
PAINLEVEL_OUTOF10: 5
PAINLEVEL_OUTOF10: 0

## 2021-12-31 NOTE — PROGRESS NOTES
Resident discharged to [facility]. Escorted by TODD on stretcter. Report given to nurse on duty. Belongings and blue packet sent with Patient. MELO and last dose Mar faxed and added to blue folder.

## 2021-12-31 NOTE — DISCHARGE SUMMARY
Discharge Summary   Trauma Services    Patient Identification:  Romayne Calandra  : 10/25/1932  MRN: [id number]   Account: [de-identified]     Admit date: 2021  Discharge date: 21  Attending provider: Dr. Tiago Wagner  Primary care provider: MESSI Aquino     Discharge Diagnoses: Active Problems:    Fall at home    Closed fracture of eighth thoracic vertebra [facility])  Resolved Problems:    * No resolved hospital problems. *       Hospital Course:   Romayne Calandra is a 80 y.o. male admitted to [facility] on 2021 for T8 vertebral body fracture, anterior longitudinal ligament disruption, T6-8 spinous process fractures following transfer from [facility] following a fall 3 days prior with no known loss of consciousness. .  Admitted under trauma services to .  Inpatient management included as follows:  PROCEDURES  21 - Kyphoplasty      PLAN  Admit to Trauma Services     Trauma by fall              - PT and OT to continue               - Fall precautions     T8 vertebral body fracture with anterior longitudinal ligament disruption   T6, T7 & T8 spinous process fractures              - Neurosurgery assisting with management              - Activity as tolerated              - MRIs of the spine are unable to be completed as pacer/defib not compatible               - Neuro checks              - Maintain spinal neutrality              - Pain control              - S/p kyphoplasty  with Dr. Chidi Phipps consulted PM&R and signed off      Hyperkalemia - resolved              -Treated with Kayexalate              -Repeat CMP , K+ 4.7     Acute metabolic encephalopathy              - Stroke alert called               - Neurology saw, CT head negative, stating likely metabolic encephalopathy              - Started Aspirin 325 mg daily, decreased to 81mg daily by neurology              - Neurology signed off               - Hospitalist following for medical management     Constipation - resolved              - No documented bowel movements until 12/26 - but small              - Denied abdominal pain, nausea, or vomiting. Tolerating diet              - On senna nightly and gylcolax BID. Fleet enema ordered prn               - Fleet enema 12/25              - Dulcolax suppository daily               - Multiple BMs documented yesterday     Hx of HTN, A-fib, DM2, CHF              - Hospitalist & cardiology assisting with management and surgical clearance     Pain control                   - Norco. Lidocaine, Morphine PRN     General diet, carb controlled  Repeat labs stable  Prophylaxis: SCDs, IS, C&DB, Pepcid, stool softeners, Lovenox   PT, OT, SLP eval and treat  Discharge disposition pending clinical course              -Patient stable from trauma perspective              -Precert initiated to IPR, discharge/readmit when pre-cert returns                 -12/51: precert denied, peer to peer denied, plan for ECF; [facility] accepted  Patient discharged with instructions for neurosurgery follow-up 12 days postop. Patient agreeable to discharge location and verbalized understanding of discharge instructions, prescription precautions, and symptoms indicating return for treatment. Patient given opportunity to ask questions, all inquiries answered. Care and discharge disposition in coordination with consulting providers and trauma surgeon Dr. Cuba. Discharge Medications:     Medication List      START taking these medications    alogliptin 12.5 MG Tabs tablet  Commonly known as: NESINA  Take 1 tablet by mouth daily     aspirin 81 MG EC tablet  Take 1 tablet by mouth daily     atorvastatin 40 MG tablet  Commonly known as: LIPITOR  Take 1 tablet by mouth nightly     HYDROcodone-acetaminophen 5-325 MG per tablet  Commonly known as: NORCO  Take 1 tablet by mouth every 6 hours as needed for Pain for up to 3 days.      midodrine 2.5 MG tablet  Commonly known as: PROAMATINE  Take 1 tablet by mouth 3 times daily (with meals)        CONTINUE taking these medications    bumetanide 2 MG tablet  Commonly known as: BUMEX     carvedilol 3.125 MG tablet  Commonly known as: COREG     clopidogrel 75 MG tablet  Commonly known as: PLAVIX     digoxin 125 MCG tablet  Commonly known as: LANOXIN     doxazosin 4 MG tablet  Commonly known as: CARDURA     LEVEMIR SC     levothyroxine 150 MCG tablet  Commonly known as: SYNTHROID     MULTI-VITAMIN PO     polyethylene glycol 17 GM/SCOOP powder  Commonly known as: GLYCOLAX     ramipril 1.25 MG capsule  Commonly known as: ALTACE     senna 8.6 MG tablet  Commonly known as: SENOKOT        STOP taking these medications    acetaminophen 500 MG tablet  Commonly known as: TYLENOL     amiodarone 200 MG tablet  Commonly known as: CORDARONE           Where to Get Your Medications      These medications were sent to [facility]/pharmacy #[id number]- [place], [address] - F [phone]  [address] [place] [address]    Phone: [phone]   · alogliptin 12.5 MG Tabs tablet  · aspirin 81 MG EC tablet  · atorvastatin 40 MG tablet  · HYDROcodone-acetaminophen 5-325 MG per tablet  · midodrine 2.5 MG tablet         Patient Instructions:    Discharge lab work: None  Activity: activity as tolerated  Diet: ADULT DIET; Regular; 4 carb choices (60 gm/meal)  ADULT ORAL NUTRITION SUPPLEMENT; Breakfast, Dinner; Diabetic Oral Supplement    Code Status: Full Code    Follow-up visits:   MESSI Gomez CNP  6 [address]    In 12 days  follow up 12 days post op        Procedures: Kyphoplasty    Consults: Neurosurgery, neurology, hospitalist    Examination:  Vitals:  Vitals:    12/30/21 2058 12/31/21 0553 12/31/21 0600 12/31/21 0834   BP: 126/68   123/65   Pulse: 82   88   Resp: 16   18   Temp: 97.8 °F (36.6 °C)   97.9 °F (36.6 °C)   TempSrc: Oral   Oral   SpO2: 98%   98%   Weight:  210 lb 8 oz (95.5 kg) 210 lb 8 oz (95.5 kg)    Height:         Weight: Weight: 210 lb 8 oz (95.5 kg)     24 hour intake/output:    Intake/Output Summary (Last 24 hours) at 12/31/2021 1010  Last data filed at 12/31/2021 0553  Gross per 24 hour   Intake 1490 ml   Output 600 ml   Net 890 ml       GENERAL: Awake, alert, no acute distress, pleasant and cooperative with exam  HEENT: Normocephalic, pupils equal and reactive to light, nares patent bilaterally  NEURO: Alert and orient x3, GCS 15, follows commands, PMS intact in all four extremities, no signs of focal neurological deficits  CSPINE/BACK: No midline cervical tenderness to palpation  HEART: Regular rate and rhythm with no obvious murmurs, rubs, gallops.  Distal pulses intact. LUNGS/CHEST WALL: Lungs are clear to auscultation bilaterally with no wheezes, rales, rhonchi.  No respiratory distress or increased work of breathing.  No chest wall tenderness to palpation.    ABDOMEN: Abdomen soft, nondistended, with no tenderness to palpation.  No guarding or peritoneal signs.  Bowel sounds active  EXTREMITIES: No cyanosis or edema.  PMS intact in all four extremities.  No extremity tenderness to palpation.  Range of motion intact.  Strength 5/5 bilaterally with  strength and plantar/dorsiflexion. SKIN: Warm and dry    Significant Diagnostics:   Radiology: XR CHEST PORTABLE    Result Date: 12/18/2021  PROCEDURE: XR CHEST PORTABLE CLINICAL INFORMATION: Trauma . TECHNIQUE: Portable semiupright COMPARISON: No prior study. FINDINGS: Heart and mediastinal and hilar contours are within normal limits. No infiltrates or effusions. Vessels are not congested left-sided AICD. EKG leads overlie the chest.     No acute disease **This report has been created using voice recognition software. It may contain minor errors which are inherent in voice recognition technology. ** Final report electronically signed by Dr. Cj Mojica on 12/18/2021 12:42 AM    CT INTERPRETATION OF OUTSIDE IMAGES    Result Date: 12/18/2021  ** ADDENDUM #1 ** Receipt of this report by the clinical staff was confirmed with Mario Bee RN on Dec 18, 2021 04:29:00 EST. This document has been electronically signed by: Yessenia Mercedes on 12/18/2021 04:30 AM ** ORIGINAL REPORT ** CT thoracic spine without IV contrast. Comparison: None Findings: Normal alignment. Osteopenia. Acute oblique nondisplaced fracture through the mid and inferior aspects of the T8 vertebral body. No significant height loss or retropulsion. Additional acute, oblique fracture through the T8 spinous process. Disruption of anterior longitudinal ligament calcification at the T8 level and therefore ligamentous disruption cannot be excluded. Multilevel degenerative disc space narrowing and hypertrophic spurring. Prominent ligamentous calcifications. No high grade canal stenosis noted. No paraspinal soft tissue hematoma. Visualized lung fields without acute pathology. Moderate cardiomegaly. Dense atheromatous coronary vascular calcifications. Acute , oblique nondisplaced fracture through the mid and inferior aspects of the T8 vertebral body. No significant height loss or retropulsion. Additional acute, oblique fracture through the T8 spinous process. Disruption of anterior longitudinal ligament calcification at the T8 level and therefore ligamentous disruption cannot be excluded. This document has been electronically signed by: Gema Oakes MD on 12/18/2021 04:17 AM All CTs at this facility use dose modulation techniques and iterative reconstructions, and/or weight-based dosing when appropriate to reduce radiation to a low as reasonably achievable. CT INTERPRETATION OF OUTSIDE IMAGES    Result Date: 12/18/2021  PROCEDURE: CT INTERPRETATION OF OUTSIDE IMAGES CLINICAL INFORMATION: trauma transfer .  TECHNIQUE: 2-D multiplanar reconstructed images of the lumbar spine All CT scans at this facility use dose modulation, iterative reconstruction, and/or weight-based dosing when appropriate to reduce radiation dose to as low as reasonably achievable. COMPARISON: No prior study. FINDINGS: No acute fracture or acute alignment malalignment. Severe degenerative changes of the disks and marginal osteophytes. Bones are osteopenic. Incidental note is made of a distention of the urinary bladder partially imaged. No acute process **This report has been created using voice recognition software. It may contain minor errors which are inherent in voice recognition technology. ** Final report electronically signed by Dr. Adeola Viveros on 12/18/2021 1:00 AM    CT INTERPRETATION OF OUTSIDE IMAGES    Result Date: 12/18/2021  PROCEDURE: CT INTERPRETATION OF OUTSIDE IMAGES CLINICAL INFORMATION: trauma transfer . TECHNIQUE: 2-D multiplanar noncontrast images of the cervical spine done at [facility]). All CT scans at this facility use dose modulation, iterative reconstruction, and/or weight-based dosing when appropriate to reduce radiation dose to as low as reasonably achievable. COMPARISON: No prior study. FINDINGS: No acute fracture or acute bony malalignment. Severe degenerative changes throughout. Osteopenia. No precervical soft tissue swelling. No acute process **This report has been created using voice recognition software. It may contain minor errors which are inherent in voice recognition technology. ** Final report electronically signed by Dr. Adeola Viveros on 12/18/2021 12:59 AM    CT INTERPRETATION OF OUTSIDE IMAGES    Result Date: 12/18/2021  PROCEDURE: CT INTERPRETATION OF OUTSIDE IMAGES CLINICAL INFORMATION: trauma transfer  . TECHNIQUE: 2-D multiplanar noncontrast images of the brain done at [facility]). All CT scans at this facility use dose modulation, iterative reconstruction, and/or weight-based dosing when appropriate to reduce radiation dose to as low as reasonably achievable. COMPARISON: No prior study. FINDINGS: Marked generalized cerebral volume loss.  Ventricular megaly consistent with volume loss. No evidence of acute hemorrhage. No acute infarct seen. Calvarium is intact. Small air-fluid level in the right maxillary sinus postsurgical changes of the sinus. Radiopaque foreign body posterior to the right maxillary sinus postsurgical. Other visualized paranasal sinuses and mastoid is clear. No acute process **This report has been created using voice recognition software. It may contain minor errors which are inherent in voice recognition technology. ** Final report electronically signed by Dr. Ferny Longoria on 12/18/2021 12:56 AM      Labs:   Recent Results (from the past 72 hour(s))   POCT glucose    Collection Time: 12/28/21 12:18 PM   Result Value Ref Range    POC Glucose 133 (H) 70 - 108 mg/dl   POCT glucose    Collection Time: 12/28/21  4:53 PM   Result Value Ref Range    POC Glucose 108 70 - 108 mg/dl   POCT glucose    Collection Time: 12/28/21  8:06 PM   Result Value Ref Range    POC Glucose 132 (H) 70 - 108 mg/dl   POCT glucose    Collection Time: 12/29/21  7:31 AM   Result Value Ref Range    POC Glucose 128 (H) 70 - 108 mg/dl   POCT glucose    Collection Time: 12/29/21 11:49 AM   Result Value Ref Range    POC Glucose 180 (H) 70 - 108 mg/dl   POCT glucose    Collection Time: 12/29/21  4:56 PM   Result Value Ref Range    POC Glucose 153 (H) 70 - 108 mg/dl   POCT glucose    Collection Time: 12/29/21  8:01 PM   Result Value Ref Range    POC Glucose 115 (H) 70 - 108 mg/dl   POCT glucose    Collection Time: 12/30/21  7:49 AM   Result Value Ref Range    POC Glucose 122 (H) 70 - 108 mg/dl   POCT glucose    Collection Time: 12/30/21 11:21 AM   Result Value Ref Range    POC Glucose 184 (H) 70 - 108 mg/dl   POCT glucose    Collection Time: 12/30/21  4:46 PM   Result Value Ref Range    POC Glucose 86 70 - 108 mg/dl   COVID-19, Rapid    Collection Time: 12/30/21  6:00 PM    Specimen: Nasopharyngeal Swab   Result Value Ref Range    SARS-CoV-2, NAAT NOT  DETECTED NOT DETECTED   POCT glucose    Collection Time: 12/30/21  7:46 PM   Result Value Ref Range    POC Glucose 157 (H) 70 - 108 mg/dl   POCT glucose    Collection Time: 12/31/21  8:04 AM   Result Value Ref Range    POC Glucose 112 (H) 70 - 108 mg/dl       Discharge condition: Stable  Disposition:  To a non-[facility] facility  Time spent on discharge: >60 minutes     Electronically signed by ANGELICA Quijano on 12/31/2021 at 10:10 AM

## 2021-12-31 NOTE — PROGRESS NOTES
Call received from Daniel Hope at [facility]. Insurance authorization received for transfer to facility. Plan for discharge on Friday, 12/31/2021. SW notified attending, Casey Allison PA-C, of insurance approval for transfer. PASRR completed and copy on chart. Transportation arranged through [facility] for Friday, 12/31/2021, at [facility] at [facility] updated on discharge time. COVID test; negative. Chart copies completed for transfer. Discharge checklist provided to nurse. Nurse notified patient's spouse and daughter of discharge time.

## 2021-12-31 NOTE — PROGRESS NOTES
Presented twice to room, unable to see patient. Patient transported by [facility] prior to evaluation this a.m. Discharge aligned yesterday. Nursing staff states patient unchanged overnight, no complaints this AM.  Discharge to [facility].   Electronically signed by Gus Lopez PA-C on 12/31/2021 at 10:17 AM

## 2022-01-05 ENCOUNTER — OFFICE VISIT (OUTPATIENT)
Dept: NEUROSURGERY | Age: 87
End: 2022-01-05

## 2022-01-05 VITALS
HEART RATE: 71 BPM | SYSTOLIC BLOOD PRESSURE: 60 MMHG | WEIGHT: 200 LBS | BODY MASS INDEX: 28.63 KG/M2 | HEIGHT: 70 IN | DIASTOLIC BLOOD PRESSURE: 40 MMHG

## 2022-01-05 DIAGNOSIS — Z98.890 S/P KYPHOPLASTY: Primary | ICD-10-CM

## 2022-01-05 DIAGNOSIS — R42 DIZZINESS: ICD-10-CM

## 2022-01-05 DIAGNOSIS — R42 LIGHTHEADEDNESS: ICD-10-CM

## 2022-01-05 DIAGNOSIS — R51.0 ORTHOSTATIC HEADACHE: ICD-10-CM

## 2022-01-05 PROCEDURE — 99024 POSTOP FOLLOW-UP VISIT: CPT

## 2022-01-05 ASSESSMENT — ENCOUNTER SYMPTOMS
COUGH: 0
BACK PAIN: 0
SHORTNESS OF BREATH: 0

## 2022-01-05 NOTE — PROGRESS NOTES
Neurosurgery Follow up Note    Date:1/5/2022         Patient [place] [facility]     YOB: 1932     Age:89 y.o. Reason for Follow up: Follow up after hospitalization for fall and  T8 Kyphoplasty          Chief Complaint:   Chief Complaint   Patient presents with   [address] from Hospital     hospital follow up    Other     lightheaded - headache         Subjective   Lin Mcgowan is a 80year old male who presents to the office today for a hospital follow up. Patient presents with his wife and daughter in a wheelchair. He states that he is ambulating with a rolling walker at the nursing home. He sustained a fall on 12/18/21 He complained of back pain. He denied hitting his head and loss of consciousness. He denies numbness, tingling, bowel and bladder incontinence. CT revealed T8 vertebral body fracture with anterior longitudinal ligament disruption, as well as T6, T7 & T8 spinous process fractures. He underwent a T8 kyphoplasty on 12/21/21 by Dr. Kate Christine. Patient states that he he is having no back pain today. He is complaining of a headache, dizziness and lightheadedness. His BP when being roomed was 82/56 sitting and when he was stood up it was 60/40. He stated the pain is in the back of his head and that his dizziness is worse when he stood up. He is at the [facility]. He stated that he is doing therapy at the SNF and it is going well. Review of Systems   Review of Systems   Constitutional: Negative for activity change, fatigue and fever. Respiratory: Negative for cough and shortness of breath. Musculoskeletal: Negative for back pain. Skin: Negative. Neurological: Positive for dizziness, light-headedness and headaches. Negative for syncope and weakness. Psychiatric/Behavioral: Negative for sleep disturbance. The patient is not nervous/anxious.         Medications     Current Outpatient Medications on File Prior to Visit   Medication Sig Dispense Refill    aspirin 81 MG EC tablet Take 1 tablet by mouth daily 30 tablet 3    atorvastatin (LIPITOR) 40 MG tablet Take 1 tablet by mouth nightly 30 tablet 3    alogliptin (NESINA) 12.5 MG TABS tablet Take 1 tablet by mouth daily 60 tablet 0    midodrine (PROAMATINE) 2.5 MG tablet Take 1 tablet by mouth 3 times daily (with meals) 90 tablet 3    Multiple Vitamin (MULTI-VITAMIN PO) Take by mouth daily      ramipril (ALTACE) 1.25 MG capsule Take 1.25 mg by mouth 2 times daily      senna (SENOKOT) 8.6 MG tablet Take 2 tablets by mouth daily      polyethylene glycol (GLYCOLAX) powder Take 17 g by mouth as needed      digoxin (LANOXIN) 125 MCG tablet Take 125 mcg by mouth daily.  doxazosin (CARDURA) 4 MG tablet Take 4 mg by mouth nightly.  bumetanide (BUMEX) 2 MG tablet Take 2 mg by mouth daily.  clopidogrel (PLAVIX) 75 MG tablet Take 75 mg by mouth daily.  Insulin Detemir (LEVEMIR SC) Inject  into the skin.  carvedilol (COREG) 3.125 MG tablet Take 3.125 mg by mouth 2 times daily (with meals).  levothyroxine (SYNTHROID) 150 MCG tablet Take 150 mcg by mouth Daily. No current facility-administered medications on file prior to visit. Past History    Past Medical History:   has a past medical history of Arthritis, CHF (congestive heart failure) ([address] Constipation, Hypertension, SunDown syndrome, Thyroid disease, and Type II or unspecified type diabetes mellitus without mention of complication, not stated as uncontrolled. Social History:   reports that he quit smoking about 38 years ago. He has never used smokeless tobacco. He reports that he does not drink alcohol and does not use drugs.      Family History:   Family History   Problem Relation Age of Onset    Stroke Mother     Arthritis Father     Heart Disease Father        Physical Examination      Vitals:  BP (!) 82/56 (Site: Left Upper Arm, Position: Sitting, Cuff Size: Large Adult)   Pulse 71   Ht 5' 10\" (1.778 m)   Wt 200 lb (90.7 kg)   BMI 28.70 kg/m²       Physical Exam  Constitutional:       Appearance: Normal appearance. He is not ill-appearing. HENT:      Head:     Cardiovascular:      Rate and Rhythm: Normal rate. Pulses: Normal pulses. Pulmonary:      Effort: Pulmonary effort is normal.   Musculoskeletal:         General: Tenderness present. No swelling or signs of injury. Skin:     General: Skin is warm and dry. Neurological:      Mental Status: He is alert. Mental status is at baseline. Sensory: No sensory deficit. Motor: No weakness. Psychiatric:         Mood and Affect: Mood normal.         Behavior: Behavior normal.         Thought Content: Thought content normal.       Neurologic Exam     Imaging   Imaging last 30 days:  XR THORACIC SPINE (3 VIEWS)    Result Date: 12/21/2021  FINDINGS/IMPRESSION:  Saved fluoroscopic images show instrumentation of the thoracic vertebral body through the bilateral pedicles with injection of radiopaque material within the vertebral body in keeping with history of kyphoplasty. Please refer to the operative report for  further formation. **This report has been created using voice recognition software. It may contain minor errors which are inherent in voice recognition technology. ** Final report electronically signed by Dr. Ismael Guzman MD on 12/21/2021 7:05 PM    CT HEAD WO CONTRAST    Result Date: 12/24/2021  1. No acute intracranial hemorrhage or mass effect is seen. This was discussed with the referring clinician at the time of dictation. 2. There is moderate diffuse atrophy. **This report has been created using voice recognition software. It may contain minor errors which are inherent in voice recognition technology. ** Final report electronically signed by Dr. Abiola Boyd on 12/24/2021 4:09 PM    XR CHEST PORTABLE    Result Date: 12/18/2021  No acute disease **This report has been created using voice recognition software.   It may contain minor errors which are inherent in voice recognition technology. ** Final report electronically signed by Dr. Delia Archer on 12/18/2021 12:42 AM    XR CHEST 1 VIEW    Result Date: 12/24/2021  1. Minimal left basilar atelectasis/infiltrate. 2. Mild stable cardiomegaly. Final report electronically signed by Dr. Mahendra Ellis on 12/24/2021 4:15 PM    CT INTERPRETATION OF OUTSIDE IMAGES    Result Date: 12/18/2021  Acute , oblique nondisplaced fracture through the mid and inferior aspects of the T8 vertebral body. No significant height loss or retropulsion. Additional acute, oblique fracture through the T8 spinous process. Disruption of anterior longitudinal ligament calcification at the T8 level and therefore ligamentous disruption cannot be excluded. This document has been electronically signed by: Adrian Dhillon MD on 12/18/2021 04:17 AM All CTs at this facility use dose modulation techniques and iterative reconstructions, and/or weight-based dosing when appropriate to reduce radiation to a low as reasonably achievable. CT INTERPRETATION OF OUTSIDE IMAGES    Result Date: 12/18/2021  No acute process **This report has been created using voice recognition software. It may contain minor errors which are inherent in voice recognition technology. ** Final report electronically signed by Dr. Delia Archer on 12/18/2021 1:00 AM    CT INTERPRETATION OF OUTSIDE IMAGES    Result Date: 12/18/2021  No acute process **This report has been created using voice recognition software. It may contain minor errors which are inherent in voice recognition technology. ** Final report electronically signed by Dr. Delia Archer on 12/18/2021 12:59 AM    CT INTERPRETATION OF OUTSIDE IMAGES    Result Date: 12/18/2021  No acute process **This report has been created using voice recognition software. It may contain minor errors which are inherent in voice recognition technology. ** Final report electronically signed by Dr. Delia Archer on 12/18/2021 12:56 AM Assessment and Plan:              1. Follow up after hospitalization for fall and T8 Kyphoplasty  2. Steri strips removed from surgical incision  3. Discussed with patient, patients wife, and daughter that with patient having dizziness, lightheadedness, and a headache and with his blood pressure being 82/56 sitting and 60/40 when patient stands up he needs to go to the emergency room to get checked out. It was explained to patient, daughter and pts wife that they need to take him straight to the emergency room to get checked out after appointment. 7. Continue therapy at the [address] [place] [address]  8. Call Shelby Alvarez to reschedule appointment. Patient had a appointment to see Dr. Lynette Lazcano tomorrow, but daughter canceled due to patient being in the nursing home. 8.  Fall precautions  9. Follow up in 4 week(s). 10. All patient questions answered. Pt voiced understanding.  Patient instructed to call the office with any questions or concerns      Electronically signed by MESSI Stinson CNP on 1/5/22 at 7:56 AM EST

## 2022-01-05 NOTE — PATIENT INSTRUCTIONS
Patient Education        Preventing Falls: Care Instructions  Your Care Instructions     Getting around your home safely can be a challenge if you have injuries or health problems that make it easy for you to fall. Loose rugs and furniture in walkways are among the dangers for many older people who have problems walking or who have poor eyesight. People who have conditions such as arthritis, osteoporosis, or dementia also have to be careful not to fall. You can make your home safer with a few simple measures. Follow-up care is a key part of your treatment and safety. Be sure to make and go to all appointments, and call your doctor if you are having problems. It's also a good idea to know your test results and keep a list of the medicines you take. How can you care for yourself at home? Taking care of yourself  · You may get dizzy if you do not drink enough water. To prevent dehydration, drink plenty of fluids. Choose water and other clear liquids. If you have kidney, heart, or liver disease and have to limit fluids, talk with your doctor before you increase the amount of fluids you drink. · Exercise regularly to improve your strength, muscle tone, and balance. Walk if you can. Swimming may be a good choice if you cannot walk easily. · Have your vision and hearing checked each year or any time you notice a change. If you have trouble seeing and hearing, you might not be able to avoid objects and could lose your balance. · Know the side effects of the medicines you take. Ask your doctor or pharmacist whether the medicines you take can affect your balance. Sleeping pills or sedatives can affect your balance. · Limit the amount of alcohol you drink. Alcohol can impair your balance and other senses. · Ask your doctor whether calluses or corns on your feet need to be removed. If you wear loose-fitting shoes because of calluses or corns, you can lose your balance and fall.   · Talk to your doctor if you have numbness in your feet. Preventing falls at home  · Remove raised doorway thresholds, throw rugs, and clutter. Repair loose carpet or raised areas in the floor. · Move furniture and electrical cords to keep them out of walking paths. · Use nonskid floor wax, and wipe up spills right away, especially on ceramic tile floors. · If you use a walker or cane, put rubber tips on it. If you use crutches, clean the bottoms of them regularly with an abrasive pad, such as steel wool. · Keep your house well lit, especially Cathn Cooke, and outside walkways. Use night-lights in areas such as hallways and bathrooms. Add extra light switches or use remote switches (such as switches that go on or off when you clap your hands) to make it easier to turn lights on if you have to get up during the night. · Install sturdy handrails on stairways. · Move items in your cabinets so that the things you use a lot are on the lower shelves (about waist level). · Keep a cordless phone and a flashlight with new batteries by your bed. If possible, put a phone in each of the main rooms of your house, or carry a cell phone in case you fall and cannot reach a phone. Or, you can wear a device around your neck or wrist. You push a button that sends a signal for help. · Wear low-heeled shoes that fit well and give your feet good support. Use footwear with nonskid soles. Check the heels and soles of your shoes for wear. Repair or replace worn heels or soles. · Do not wear socks without shoes on wood floors. · Walk on the grass when the sidewalks are slippery. If you live in an area that gets snow and ice in the winter, sprinkle salt on slippery steps and sidewalks. Preventing falls in the bath  · Install grab bars and nonskid mats inside and outside your shower or tub and near the toilet and sinks. · Use shower chairs and bath benches. · Use a hand-held shower head that will allow you to sit while showering.   · Get into a tub or

## 2023-02-07 ENCOUNTER — OUTSIDE SERVICES (OUTPATIENT)
Dept: FAMILY MEDICINE CLINIC | Age: 88
End: 2023-02-07
Payer: MEDICARE

## 2023-02-07 VITALS
RESPIRATION RATE: 20 BRPM | WEIGHT: 221 LBS | SYSTOLIC BLOOD PRESSURE: 138 MMHG | TEMPERATURE: 97.6 F | HEART RATE: 82 BPM | BODY MASS INDEX: 31.71 KG/M2 | DIASTOLIC BLOOD PRESSURE: 65 MMHG | OXYGEN SATURATION: 100 %

## 2023-02-07 DIAGNOSIS — E11.22 TYPE 2 DIABETES MELLITUS WITH STAGE 3 CHRONIC KIDNEY DISEASE, WITHOUT LONG-TERM CURRENT USE OF INSULIN, UNSPECIFIED WHETHER STAGE 3A OR 3B CKD (HCC): ICD-10-CM

## 2023-02-07 DIAGNOSIS — I50.9 CHRONIC CONGESTIVE HEART FAILURE, UNSPECIFIED HEART FAILURE TYPE (HCC): ICD-10-CM

## 2023-02-07 DIAGNOSIS — E03.9 ACQUIRED HYPOTHYROIDISM: ICD-10-CM

## 2023-02-07 DIAGNOSIS — F05 SUNDOWN SYNDROME: ICD-10-CM

## 2023-02-07 DIAGNOSIS — I10 PRIMARY HYPERTENSION: ICD-10-CM

## 2023-02-07 DIAGNOSIS — I48.0 PAROXYSMAL ATRIAL FIBRILLATION (HCC): ICD-10-CM

## 2023-02-07 DIAGNOSIS — J30.2 SEASONAL ALLERGIES: ICD-10-CM

## 2023-02-07 DIAGNOSIS — K59.00 CONSTIPATION, UNSPECIFIED CONSTIPATION TYPE: ICD-10-CM

## 2023-02-07 DIAGNOSIS — J18.9 COMMUNITY ACQUIRED PNEUMONIA OF RIGHT LOWER LOBE OF LUNG: Primary | ICD-10-CM

## 2023-02-07 DIAGNOSIS — N18.30 STAGE 3 CHRONIC KIDNEY DISEASE, UNSPECIFIED WHETHER STAGE 3A OR 3B CKD (HCC): ICD-10-CM

## 2023-02-07 DIAGNOSIS — N18.30 TYPE 2 DIABETES MELLITUS WITH STAGE 3 CHRONIC KIDNEY DISEASE, WITHOUT LONG-TERM CURRENT USE OF INSULIN, UNSPECIFIED WHETHER STAGE 3A OR 3B CKD (HCC): ICD-10-CM

## 2023-02-07 PROCEDURE — 99306 1ST NF CARE HIGH MDM 50: CPT | Performed by: FAMILY MEDICINE

## 2023-02-07 ASSESSMENT — ENCOUNTER SYMPTOMS
WHEEZING: 0
COUGH: 0
CONSTIPATION: 1
DIARRHEA: 0
NAUSEA: 1
SORE THROAT: 0
SHORTNESS OF BREATH: 0
RHINORRHEA: 0
EYE REDNESS: 0

## 2023-02-07 NOTE — PROGRESS NOTES
Richard Hartman is a 80 y.o. male who presents today for his medical conditions/complaints as noted below. Chief Complaint   Patient presents with    New Patient     Admission to the facility for skilled stay             HPI:     HPI  Patient was seen and examined in his room today for admission to facility. Patient initially presented to [facility] ER on 1/25/2023 with his family complaining of increased urinary frequency, fatigue and altered mental status. Patient's daughter reportedly stated that he had been more confused. He had a stroke last October that left him with impairment in his peripheral vision but denied any headache or chest pain or shortness of breath. He was afebrile with a temperature of 97.6 and his heart rate was 82 but EKG showed A-fib. He did have a chest x-ray which showed right lower lobe pneumonia. CT of his head on 1/25/2023 showed chronic involutional changes without evidence of acute intracranial or calvarial abnormality. Also interval evolution of the right occipital lobe stroke when compared to prior study. Labs showed a white blood cell count of 4.3, hemoglobin of 12.1. UA was negative for UTI. BNP was slightly elevated at 801. His sodium level was 125. BUN was 44 and creatinine was 1.8 which is his baseline as he has chronic kidney disease stage III. Patient was admitted and treated with IV azithromycin and rocephin for community-acquired pneumonia. His hyponatremia improved with IV fluids to a sodium of 132 on discharge. His altered mental status had returned back to his baseline but he was noted to have some sundowning effect to his dementia. Patient has chronic medical conditions including diabetes, CHF, CAD, hypertension, hypothyroidism, history of CVA, A-fib. Today he states that he is feeling fairly well except he is constipated. He states he has been passing gas but has been unable to have a bowel movement. He would like something for this.   Also mentions that his wife fell at home and he thinks she is in the hospital which has some slightly worried. Does mention he has some swelling but states this is chronic and he denies any shortness of breath. Otherwise feeling well without complaints.   Past Medical History:   Diagnosis Date    Arthritis     CHF (congestive heart failure) (HCC)     Constipation     Hypertension     SunDown syndrome     Thyroid disease     Type II or unspecified type diabetes mellitus without mention of complication, not stated as uncontrolled       Past Surgical History:   Procedure Laterality Date    CHOLECYSTECTOMY      COLONOSCOPY      OTHER SURGICAL HISTORY  2013    Green light laser photoselective vaporization of prostate    PACEMAKER INSERTION      with Defibrillator    PACEMAKER INSERTION      SPINE SURGERY N/A 2021    KYPHOPLASTY AT T8 performed by Annabelle Bowling MD at [address] [place] [address]       Family History   Problem Relation Age of Onset    Stroke Mother     Arthritis Father     Heart Disease Father        Social History     Tobacco Use    Smoking status: Former     Types: Cigarettes     Quit date: 1983     Years since quittin.6    Smokeless tobacco: Never   Substance Use Topics    Alcohol use: No     Alcohol/week: 0.0 standard drinks      Allergies   Allergen Reactions    Flomax [Tamsulosin Hcl] Other (See Comments)    Tamsulosin      Per Son/ Carina Draper was discontinued due to patient having constant dripping from nose       Health Maintenance   Topic Date Due    Depression Screen  Never done    Shingles vaccine (1 of 2) Never done    Annual Wellness Visit (AWV)  Never done    COVID-19 Vaccine (4 - Booster for [place] series) 2021    Flu vaccine (1) 2022    Lipids  2022    DTaP/Tdap/Td vaccine (2 - Td or Tdap) 2025    Pneumococcal 65+ years Vaccine  Completed    Hepatitis A vaccine  Aged Out    Hib vaccine  Aged Out    Meningococcal (ACWY) vaccine  Aged Out       Subjective:      Review of Systems   Constitutional:  Positive for activity change and fatigue. Negative for chills and fever. HENT:  Negative for congestion, rhinorrhea and sore throat. Eyes:  Negative for redness and visual disturbance. Respiratory:  Negative for cough, shortness of breath and wheezing. Cardiovascular:  Positive for leg swelling. Negative for chest pain and palpitations. Gastrointestinal:  Positive for constipation and nausea. Negative for diarrhea. Endocrine: Negative for polydipsia and polyuria. Genitourinary:  Negative for dysuria, frequency and hematuria. Musculoskeletal:  Negative for arthralgias, gait problem and myalgias. Skin:  Negative for rash and wound. Allergic/Immunologic: Negative for environmental allergies and immunocompromised state. Neurological:  Negative for dizziness, light-headedness and headaches. Hematological:  Bruises/bleeds easily. Psychiatric/Behavioral:  Negative for dysphoric mood and sleep disturbance. The patient is nervous/anxious. Objective:     Physical Exam  Vitals and nursing note reviewed. Constitutional:       General: He is not in acute distress. Appearance: He is well-developed. HENT:      Head: Normocephalic and atraumatic. Right Ear: External ear normal.      Left Ear: External ear normal.      Nose: Nose normal.   Eyes:      General: No scleral icterus. Right eye: No discharge. Left eye: No discharge. Conjunctiva/sclera: Conjunctivae normal.   Neck:      Thyroid: No thyromegaly. Vascular: No JVD. Cardiovascular:      Rate and Rhythm: Normal rate and regular rhythm. Heart sounds: Normal heart sounds. Pulmonary:      Effort: Pulmonary effort is normal. No respiratory distress. Breath sounds: No stridor. Decreased breath sounds present. No wheezing or rales. Abdominal:      General: Bowel sounds are increased. There is no distension.       Palpations: Abdomen is soft.      Tenderness: There is no abdominal tenderness. Musculoskeletal:         General: No deformity. Normal range of motion. Right shoulder: No tenderness or bony tenderness. Left shoulder: No tenderness or bony tenderness. Cervical back: Neck supple. No tenderness or bony tenderness. Thoracic back: No spasms, tenderness or bony tenderness. Lumbar back: No tenderness or bony tenderness. Lymphadenopathy:      Cervical: No cervical adenopathy. Upper Body:      Right upper body: No supraclavicular adenopathy. Left upper body: No supraclavicular adenopathy. Skin:     General: Skin is warm and dry. Findings: No erythema or rash. Neurological:      Mental Status: He is alert and oriented to person, place, and time. Motor: No atrophy, abnormal muscle tone or seizure activity. Psychiatric:         Attention and Perception: Attention normal.         Mood and Affect: Mood normal.         Speech: Speech normal.         Behavior: Behavior normal.         Cognition and Memory: Cognition is impaired. Memory is impaired. /65   Pulse 82   Temp 97.6 °F (36.4 °C)   Resp 20   Wt 221 lb (100.2 kg)   SpO2 100%   BMI 31.71 kg/m²     Assessment/Plan      1. Community acquired pneumonia of right lower lobe of lung    2. SunDown syndrome    3. Paroxysmal atrial fibrillation (HCC)    4. Stage 3 chronic kidney disease, unspecified whether stage 3a or 3b CKD ([address]    5. Type 2 diabetes mellitus with stage 3 chronic kidney disease, without long-term current use of insulin, unspecified whether stage 3a or 3b CKD ([address]    6. Primary hypertension    7. Chronic congestive heart failure, unspecified heart failure type ([address]    8. Acquired hypothyroidism    9. Constipation, unspecified constipation type    10. Seasonal allergies      Therapy to eval and treat. Antibiotics complete. Monitor for signs and symptoms. Dementia, with sundown syndrome.  Supportive care  Cont with asa. Rate controlled. Fall risk so [facility] contraindicated  Chronic, labs from hospital reviewed. At baseline. Avoid nephrotoxins  Controlled without meds, diabetic diet  Controlled, cont bumex, monitor  Controlled, cont bumex  Cont levothyroxine  Add colace and prn miralax  Cont drops and loratadine    Patient seen and examined. History partially obtained by chart review and nursing notes. I have reviewed patient's past medical, surgical, social, and family history and have made updates where appropriate. See facility EMR for updated medication list.      More than 50% of the total visit time must involve counseling/coordination of care. The total visit time encompasses both the face-to-face time spent with the patient at the bedside and the additional time spent on the unit/floor reviewing data, obtaining relevant patient information, and discussing the case with other involved healthcare providers. On this date 2/7/23 I have spent 46 minutes reviewing previous notes, test results and face to face with the patient discussing the diagnosis and importance of compliance with the treatment plan as well as documenting on the day of the visit.      Electronically signed by Scott Manjarrez MD on 2/7/2023 at 11:37 AM

## 2023-05-01 ENCOUNTER — TELEPHONE (OUTPATIENT)
Dept: FAMILY MEDICINE CLINIC | Age: 88
End: 2023-05-01

## (undated) DEVICE — NEEDLE SPNL L3.5IN PNK HUB S STL REG WALL FIT STYL W/ QNCKE

## (undated) DEVICE — 1010 S-DRAPE TOWEL DRAPE 10/BX: Brand: STERI-DRAPE™

## (undated) DEVICE — 3M™ IOBAN™ 2 ANTIMICROBIAL INCISE DRAPE 6650EZ: Brand: IOBAN™ 2

## (undated) DEVICE — SHEET, T, LAPAROTOMY, STERILE: Brand: MEDLINE

## (undated) DEVICE — BONE TAMP KIT KEX152EB FF E2 15/2 OI: Brand: KYPHON EXPRESS II KYPHOPAK TRAY

## (undated) DEVICE — CEMENT CARTRIDGES CC02A CDS: Brand: KYPHON® CEMENT DELIVERY SYSTEM

## (undated) DEVICE — BONE BIOPSY DEVICE F07A TAPERED SIZE 2: Brand: MEDTRONIC REUSABLE INSTRUMENTS

## (undated) DEVICE — PACK-MAJOR

## (undated) DEVICE — COVERS JACKSON TABLE ULTRA COMFORT MED

## (undated) DEVICE — LAPAROTOMY DRAPE WITH POUCHES: Brand: CONVERTORS

## (undated) DEVICE — Device: Brand: LIGHT GUARD™ FLEXIBLE LIGHT HANDLE COVER (2 EACH/PKG)

## (undated) DEVICE — PACK PROCEDURE SURG SET UP SRMC

## (undated) DEVICE — BONE CEMENT CX01B KYPHON XPEDE W MXR US: Brand: KYPHON® XPEDE™ BONE CEMENT AND KYPHON® MIXER PACK

## (undated) DEVICE — CEMENT GUN AND BONE FILLER CDS2A SISE 2: Brand: MEDTRONIC REUSABLE INSTRUMENTS

## (undated) DEVICE — GAUZE,SPONGE,8"X4",12PLY,XRAY,STRL,LF: Brand: MEDLINE

## (undated) DEVICE — MARKER,SKIN,WI/RULER AND LABELS: Brand: MEDLINE

## (undated) DEVICE — GOWN,SIRUS,NONRNF,SETINSLV,XL,20/CS: Brand: MEDLINE

## (undated) DEVICE — GLOVE SURG SZ 85 L12IN THK75MIL DK GRN LTX FREE

## (undated) DEVICE — BAG,BANDED,W/RUBBERBAND,STERILE,30X36: Brand: MEDLINE

## (undated) DEVICE — TOWEL,OR,DSP,ST,BLUE,STD,4/PK,20PK/CS: Brand: MEDLINE

## (undated) DEVICE — GLOVE ORANGE PI 8   MSG9080

## (undated) DEVICE — STRIP,CLOSURE,WOUND,MEDI-STRIP,1/2X4: Brand: MEDLINE